# Patient Record
Sex: FEMALE | Race: WHITE | NOT HISPANIC OR LATINO | Employment: OTHER | URBAN - METROPOLITAN AREA
[De-identification: names, ages, dates, MRNs, and addresses within clinical notes are randomized per-mention and may not be internally consistent; named-entity substitution may affect disease eponyms.]

---

## 2020-10-29 NOTE — TELEPHONE ENCOUNTER
PATIENT NEEDS REFILLS ON METFORMIN. SHE IS NOT SURE THE DOSAGE OF MEDICATION. PHARMACY IS [facility] [place]. SHE IS COMPLETELY OUT OF METFORMIN.

## 2020-12-16 ENCOUNTER — OFFICE VISIT (OUTPATIENT)
Dept: ENDOCRINOLOGY | Facility: CLINIC | Age: 70
End: 2020-12-16

## 2020-12-16 ENCOUNTER — LAB REQUISITION (OUTPATIENT)
Dept: LAB | Facility: HOSPITAL | Age: 70
End: 2020-12-16

## 2020-12-16 VITALS
HEART RATE: 87 BPM | RESPIRATION RATE: 15 BRPM | DIASTOLIC BLOOD PRESSURE: 90 MMHG | WEIGHT: 216 LBS | HEIGHT: 67 IN | OXYGEN SATURATION: 100 % | SYSTOLIC BLOOD PRESSURE: 150 MMHG | BODY MASS INDEX: 33.9 KG/M2

## 2020-12-16 DIAGNOSIS — E11.65 UNCONTROLLED TYPE 2 DIABETES MELLITUS WITH HYPERGLYCEMIA (HCC): Primary | ICD-10-CM

## 2020-12-16 DIAGNOSIS — E78.2 MIXED HYPERLIPIDEMIA: ICD-10-CM

## 2020-12-16 DIAGNOSIS — Z00.00 ROUTINE GENERAL MEDICAL EXAMINATION AT A HEALTH CARE FACILITY: ICD-10-CM

## 2020-12-16 DIAGNOSIS — I10 BENIGN HYPERTENSION: ICD-10-CM

## 2020-12-16 PROBLEM — Z79.4 CONTROLLED TYPE 2 DIABETES MELLITUS WITHOUT COMPLICATION, WITH LONG-TERM CURRENT USE OF INSULIN: Status: ACTIVE | Noted: 2020-12-16

## 2020-12-16 PROBLEM — E11.9 CONTROLLED TYPE 2 DIABETES MELLITUS WITHOUT COMPLICATION, WITH LONG-TERM CURRENT USE OF INSULIN: Status: ACTIVE | Noted: 2020-12-16

## 2020-12-16 LAB
EXPIRATION DATE: ABNORMAL
HBA1C MFR BLD: 13.4 %
Lab: ABNORMAL

## 2020-12-16 PROCEDURE — 36415 COLL VENOUS BLD VENIPUNCTURE: CPT | Performed by: INTERNAL MEDICINE

## 2020-12-16 PROCEDURE — 99214 OFFICE O/P EST MOD 30 MIN: CPT | Performed by: INTERNAL MEDICINE

## 2020-12-16 PROCEDURE — 83036 HEMOGLOBIN GLYCOSYLATED A1C: CPT | Performed by: INTERNAL MEDICINE

## 2020-12-16 RX ORDER — LANCETS 33 GAUGE
EACH MISCELLANEOUS
Qty: 100 EACH | Refills: 3 | Status: SHIPPED | OUTPATIENT
Start: 2020-12-16 | End: 2021-06-18 | Stop reason: SDUPTHER

## 2020-12-16 RX ORDER — ICOSAPENT ETHYL 1000 MG/1
1 CAPSULE ORAL 2 TIMES DAILY WITH MEALS
Start: 2020-12-16 | End: 2020-12-18

## 2020-12-16 RX ORDER — SEMAGLUTIDE 1.34 MG/ML
0.5 INJECTION, SOLUTION SUBCUTANEOUS WEEKLY
Qty: 1.5 ML | Refills: 5 | Status: SHIPPED | OUTPATIENT
Start: 2020-12-16 | End: 2021-04-06 | Stop reason: SDUPTHER

## 2020-12-16 RX ORDER — ASPIRIN 81 MG/1
81 TABLET ORAL DAILY
COMMUNITY
End: 2021-09-23 | Stop reason: SDUPTHER

## 2020-12-16 NOTE — ASSESSMENT & PLAN NOTE
Diabetes is worsening.   Needs intensification of therapy.  Diabetes will be reassessed in 3 months.    A1c is terrible.  She has run out of medication.  Resume metformin.  Will add another medication.  We discussed using GLP-1 RA.  Start doing FSBS regularly.

## 2020-12-16 NOTE — ASSESSMENT & PLAN NOTE
Hypertension is worsening.  Continue current treatment regimen.  Blood pressure will be reassessed in 3 months.    BP elevated.  Hasn't taken meds yet this AM.

## 2020-12-16 NOTE — PROGRESS NOTES
"     Office Note      Date: 2020  Patient Name: Renata Lynn  MRN: [id number]  : 1950    Chief Complaint   Patient presents with   • Diabetes     Note: pt does not know meds and doesn't have a list; says she will fax list when she gets home       History of Present Illness:   Renata Lynn is a 70 y.o. female who presents for Diabetes type 2. Diagnosed in: 2018. Treated in past with oral agents. Current treatments: metformin. Number of insulin shots per day: none. Checks blood sugar none times a day. Has low blood sugar: no. Aspirin use: yes. Statin use: Yes. ACE-I/ARB use: Yes. Changes in health since last visit: none. Last eye exam .    Subjective      Diabetic Complications:  Eyes: No  Kidneys: No  Feet: No  Heart: No    Diet and Exercise:  Meals per day: 3  Minutes of exercise per week: 0 mins.    Review of Systems:   Review of Systems   Constitutional: Negative.    Cardiovascular: Negative.    Gastrointestinal: Negative.    Endocrine: Negative.        The following portions of the patient's history were reviewed and updated as appropriate: allergies, current medications, past family history, past medical history, past social history, past surgical history and problem list.    Objective       Visit Vitals  /90   Pulse 87   Resp 15   Ht 168.9 cm (66.5\")   Wt 98 kg (216 lb) Comment: pt stated   SpO2 100%   BMI 34.34 kg/m²       Physical Exam:  Physical Exam  Constitutional:       Appearance: Normal appearance.   Cardiovascular:      Pulses:           Dorsalis pedis pulses are 2+ on the right side and 2+ on the left side.        Posterior tibial pulses are 2+ on the right side and 2+ on the left side.   Feet:      Right foot:      Protective Sensation: 5 sites tested. 5 sites sensed.      Skin integrity: Skin integrity normal.      Toenail Condition: Right toenails are normal.      Left foot:      Protective Sensation: 5 sites tested. 5 sites sensed.      Skin integrity: Skin integrity " normal.      Toenail Condition: Left toenails are normal.   Neurological:      Mental Status: She is alert.         Labs:    HbA1c  Lab Results   Component Value Date    HGBA1C 13.4 12/16/2020       CMP  No results found for: GLUCOSE, BUN, CREATININE, EGFRIFNONA, EGFRIFAFRI, BCR, K, CO2, CALCIUM, PROTENTOTREF, LABIL2, BILIRUBIN, AST, ALT     Lipid Panel        TSH  No results found for: TSH, FREET4     Hemoglobin A1C  Lab Results   Component Value Date    HGBA1C 13.4 12/16/2020        Microalbumin/Creatinine  No results found for: MALBCRERATIO, CREATINIURIN, MICROALBUR        Assessment / Plan      Assessment & Plan:  Problem List Items Addressed This Visit        Cardiovascular and Mediastinum    Benign hypertension    Current Assessment & Plan     Hypertension is worsening.  Continue current treatment regimen.  Blood pressure will be reassessed in 3 months.    BP elevated.  Hasn't taken meds yet this AM.         Mixed hyperlipidemia    Current Assessment & Plan     Check lipids today.         Relevant Medications    icosapent ethyl (Vascepa) 1 g capsule capsule       Endocrine    Controlled type 2 diabetes mellitus without complication, with long-term current use of insulin (CMS/[facility]) - Primary    Current Assessment & Plan     Diabetes is worsening.   Needs intensification of therapy.  Diabetes will be reassessed in 3 months.    A1c is terrible.  She has run out of medication.  Resume metformin.  Will add another medication.  We discussed using GLP-1 RA.  Start doing FSBS regularly.         Relevant Medications    Semaglutide,0.25 or 0.5MG/DOS, (Ozempic, 0.25 or 0.5 MG/DOSE,) 2 MG/1.5ML solution pen-injector    metFORMIN (GLUCOPHAGE) 1000 MG tablet    Other Relevant Orders    POC Glycosylated Hemoglobin (Hb A1C) (Completed)    Comprehensive Metabolic Panel    Lipid Panel    Microalbumin / Creatinine Urine Ratio - Urine, Clean Catch    TSH           Return in about 3 months (around 3/16/2021) for Recheck with A1c,  CMP.    Leroy Whipple MD   12/16/2020

## 2020-12-18 ENCOUNTER — TELEPHONE (OUTPATIENT)
Dept: ENDOCRINOLOGY | Facility: CLINIC | Age: 70
End: 2020-12-18

## 2020-12-18 LAB
ALBUMIN SERPL-MCNC: 4.6 G/DL (ref 3.8–4.8)
ALBUMIN/CREAT UR: 22 MG/G CREAT (ref 0–29)
ALBUMIN/GLOB SERPL: 2 {RATIO} (ref 1.2–2.2)
ALP SERPL-CCNC: 110 IU/L (ref 39–117)
ALT SERPL-CCNC: 26 IU/L (ref 0–32)
AST SERPL-CCNC: 21 IU/L (ref 0–40)
BILIRUB SERPL-MCNC: 0.4 MG/DL (ref 0–1.2)
BUN SERPL-MCNC: 17 MG/DL (ref 8–27)
BUN/CREAT SERPL: 19 (ref 12–28)
CALCIUM SERPL-MCNC: 9.7 MG/DL (ref 8.7–10.3)
CHLORIDE SERPL-SCNC: 95 MMOL/L (ref 96–106)
CHOLEST SERPL-MCNC: 226 MG/DL (ref 100–199)
CO2 SERPL-SCNC: 23 MMOL/L (ref 20–29)
CONV .: NORMAL
CREAT SERPL-MCNC: 0.88 MG/DL (ref 0.57–1)
CREAT UR-MCNC: 29.1 MG/DL
GLOBULIN SER CALC-MCNC: 2.3 G/DL (ref 1.5–4.5)
GLUCOSE SERPL-MCNC: 445 MG/DL (ref 65–99)
HDLC SERPL-MCNC: 36 MG/DL
LDLC SERPL CALC-MCNC: 92 MG/DL (ref 0–99)
Lab: NORMAL
MICROALBUMIN UR-MCNC: 6.5 UG/ML
POTASSIUM SERPL-SCNC: 4.4 MMOL/L (ref 3.5–5.2)
PROT SERPL-MCNC: 6.9 G/DL (ref 6–8.5)
SODIUM SERPL-SCNC: 139 MMOL/L (ref 134–144)
TRIGL SERPL-MCNC: 593 MG/DL (ref 0–149)
TSH SERPL DL<=0.005 MIU/L-ACNC: 1.94 UIU/ML (ref 0.45–4.5)
VLDLC SERPL CALC-MCNC: 98 MG/DL (ref 5–40)

## 2020-12-18 RX ORDER — ICOSAPENT ETHYL 1000 MG/1
2 CAPSULE ORAL 2 TIMES DAILY WITH MEALS
Qty: 120 CAPSULE | Refills: 5 | Status: SHIPPED | OUTPATIENT
Start: 2020-12-18 | End: 2021-01-21 | Stop reason: SDUPTHER

## 2020-12-18 NOTE — TELEPHONE ENCOUNTER
PATIENT STATES THAT [facility] HAS TOLD HER THAT THEY DID NOT RECEIVE HER METFORMIN RX. PLEASE RESEND

## 2020-12-21 PROBLEM — E11.65 UNCONTROLLED TYPE 2 DIABETES MELLITUS WITH HYPERGLYCEMIA: Status: ACTIVE | Noted: 2020-12-16

## 2021-01-21 RX ORDER — ICOSAPENT ETHYL 1000 MG/1
2 CAPSULE ORAL 2 TIMES DAILY WITH MEALS
Qty: 120 CAPSULE | Refills: 5 | Status: SHIPPED | OUTPATIENT
Start: 2021-01-21 | End: 2021-01-25

## 2021-01-25 RX ORDER — ICOSAPENT ETHYL 1000 MG/1
CAPSULE ORAL
Qty: 60 CAPSULE | Refills: 5 | Status: SHIPPED | OUTPATIENT
Start: 2021-01-25 | End: 2021-07-16

## 2021-04-06 ENCOUNTER — OFFICE VISIT (OUTPATIENT)
Dept: ENDOCRINOLOGY | Facility: CLINIC | Age: 71
End: 2021-04-06

## 2021-04-06 VITALS
WEIGHT: 208.4 LBS | TEMPERATURE: 97.8 F | HEIGHT: 66 IN | DIASTOLIC BLOOD PRESSURE: 58 MMHG | HEART RATE: 92 BPM | SYSTOLIC BLOOD PRESSURE: 140 MMHG | OXYGEN SATURATION: 97 % | BODY MASS INDEX: 33.49 KG/M2

## 2021-04-06 DIAGNOSIS — E11.65 UNCONTROLLED TYPE 2 DIABETES MELLITUS WITH HYPERGLYCEMIA (HCC): Primary | ICD-10-CM

## 2021-04-06 DIAGNOSIS — I10 BENIGN HYPERTENSION: ICD-10-CM

## 2021-04-06 DIAGNOSIS — E78.2 MIXED HYPERLIPIDEMIA: ICD-10-CM

## 2021-04-06 LAB
EXPIRATION DATE: NORMAL
HBA1C MFR BLD: 7 %
Lab: NORMAL

## 2021-04-06 PROCEDURE — 83036 HEMOGLOBIN GLYCOSYLATED A1C: CPT | Performed by: INTERNAL MEDICINE

## 2021-04-06 PROCEDURE — 99214 OFFICE O/P EST MOD 30 MIN: CPT | Performed by: INTERNAL MEDICINE

## 2021-04-06 RX ORDER — ATORVASTATIN CALCIUM 10 MG/1
10 TABLET, FILM COATED ORAL
COMMUNITY
Start: 2021-03-06 | End: 2021-09-23 | Stop reason: SDUPTHER

## 2021-04-06 RX ORDER — HYDROCHLOROTHIAZIDE 25 MG/1
25 TABLET ORAL DAILY
COMMUNITY
Start: 2021-03-06 | End: 2021-09-23 | Stop reason: SDUPTHER

## 2021-04-06 RX ORDER — VALSARTAN 320 MG/1
320 TABLET ORAL DAILY
COMMUNITY
Start: 2021-04-01 | End: 2021-09-23 | Stop reason: SDUPTHER

## 2021-04-06 RX ORDER — CARVEDILOL 12.5 MG/1
12.5 TABLET ORAL 2 TIMES DAILY
COMMUNITY
Start: 2021-01-22 | End: 2021-09-23 | Stop reason: SDUPTHER

## 2021-04-06 RX ORDER — SEMAGLUTIDE 1.34 MG/ML
0.5 INJECTION, SOLUTION SUBCUTANEOUS WEEKLY
Qty: 4.5 ML | Refills: 3 | Status: SHIPPED | OUTPATIENT
Start: 2021-04-06 | End: 2021-06-18

## 2021-04-06 RX ORDER — HYDROXYPROPYL CELLULOSE 5 MG/1
INSERT OPHTHALMIC DAILY
COMMUNITY
Start: 2021-02-28 | End: 2022-09-30

## 2021-04-06 NOTE — PROGRESS NOTES
"     Office Note      Date: 2021  Patient Name: Renata Lynn  MRN: [id number]  : 1950    Chief Complaint   Patient presents with   • Diabetes     F/U type 2       History of Present Illness:   Renata Lynn is a 70 y.o. female who presents for Diabetes type 2. Diagnosed in: 2018. Treated in past with oral agents. Current treatments: metformin and ozempic. Number of insulin shots per day: none. Checks blood sugar one time a day. Has low blood sugar: no. Aspirin use: yes. Statin use: Yes. ACE-I/ARB use: Yes. Changes in health since last visit: none. Last eye exam .    Subjective      Diabetic Complications:  Eyes: No  Kidneys: No  Feet: No  Heart: No    Diet and Exercise:  Meals per day: 3  Minutes of exercise per week: 0 mins.    Review of Systems:   Review of Systems   Constitutional: Negative.    Cardiovascular: Negative.    Gastrointestinal: Negative.    Endocrine: Negative.        The following portions of the patient's history were reviewed and updated as appropriate: allergies, current medications, past family history, past medical history, past social history, past surgical history and problem list.    Objective       Visit Vitals  /58   Pulse 92   Temp 97.8 °F (36.6 °C) (Infrared)   Ht 167.6 cm (66\")   Wt 94.5 kg (208 lb 6.4 oz)   SpO2 97%   BMI 33.64 kg/m²       Physical Exam:  Physical Exam  Constitutional:       Appearance: Normal appearance.   Neurological:      Mental Status: She is alert.         Labs:    HbA1c  Lab Results   Component Value Date    HGBA1C 7.0 2021       CMP  Lab Results   Component Value Date    BUN 17 2020    CREATININE 0.88 2020    EGFRIFNONA 67 2020    EGFRIFAFRI 78 2020    BCR 19 2020    K 4.4 2020    CO2 23 2020    CALCIUM 9.7 2020    PROTENTOTREF 6.9 2020    LABIL2 2.0 2020    AST 21 2020    ALT 26 2020        Lipid Panel  Lab Results   Component Value Date    CHLPL 226 (H) " 12/16/2020    HDL 36 (L) 12/16/2020    LDL 92 12/16/2020    TRIG 593 (H) 12/16/2020        TSH  Lab Results   Component Value Date    TSH 1.940 12/16/2020        Hemoglobin A1C  Lab Results   Component Value Date    HGBA1C 7.0 04/06/2021        Microalbumin/Creatinine  Lab Results   Component Value Date    MALBCRERATIO 22 12/16/2020    CREATINIURIN 29.1 12/16/2020    MICROALBUR 6.5 12/16/2020           Assessment / Plan      Assessment & Plan:  Diagnoses and all orders for this visit:    1. Uncontrolled type 2 diabetes mellitus with hyperglycemia (CMS/[facility]) (Primary)  Assessment & Plan:  Diabetes is improving with treatment.   Continue current treatment regimen.  Diabetes will be reassessed in 3 months.    Orders:  -     POC Glycosylated Hemoglobin (Hb A1C)  -     Comprehensive Metabolic Panel; Future    2. Benign hypertension  Assessment & Plan:  Hypertension is improving with treatment.  Continue current treatment regimen.  Blood pressure will be reassessed at the next regular appointment.      3. Mixed hyperlipidemia  Assessment & Plan:  Continue statin and vascepa.  Check lipids next visit.      Other orders  -     metFORMIN (GLUCOPHAGE) 1000 MG tablet; Take 1 tablet by mouth 2 (Two) Times a Day With Meals.  Dispense: 180 tablet; Refill: 1  -     Semaglutide,0.25 or 0.5MG/DOS, (Ozempic, 0.25 or 0.5 MG/DOSE,) 2 MG/1.5ML solution pen-injector; Inject 0.5 mg under the skin into the appropriate area as directed 1 (One) Time Per Week.  Dispense: 4.5 mL; Refill: 3      Return in about 3 months (around 7/6/2021) for Recheck with A1c, CMP, lipids.    Leroy Whipple MD   04/06/2021

## 2021-04-07 LAB
ALBUMIN SERPL-MCNC: 4.6 G/DL (ref 3.5–5.2)
ALBUMIN/GLOB SERPL: 1.8 G/DL
ALP SERPL-CCNC: 92 U/L (ref 39–117)
ALT SERPL-CCNC: 35 U/L (ref 1–33)
AST SERPL-CCNC: 32 U/L (ref 1–32)
BILIRUB SERPL-MCNC: 0.4 MG/DL (ref 0–1.2)
BUN SERPL-MCNC: 20 MG/DL (ref 8–23)
BUN/CREAT SERPL: 26 (ref 7–25)
CALCIUM SERPL-MCNC: 10 MG/DL (ref 8.6–10.5)
CHLORIDE SERPL-SCNC: 101 MMOL/L (ref 98–107)
CO2 SERPL-SCNC: 25.2 MMOL/L (ref 22–29)
CREAT SERPL-MCNC: 0.77 MG/DL (ref 0.57–1)
GLOBULIN SER CALC-MCNC: 2.5 GM/DL
GLUCOSE SERPL-MCNC: 165 MG/DL (ref 65–99)
POTASSIUM SERPL-SCNC: 4.4 MMOL/L (ref 3.5–5.2)
PROT SERPL-MCNC: 7.1 G/DL (ref 6–8.5)
SODIUM SERPL-SCNC: 140 MMOL/L (ref 136–145)

## 2021-06-18 RX ORDER — LANCETS 33 GAUGE
EACH MISCELLANEOUS
Qty: 100 EACH | Refills: 1 | Status: SHIPPED | OUTPATIENT
Start: 2021-06-18 | End: 2022-09-26 | Stop reason: SDUPTHER

## 2021-06-18 RX ORDER — SEMAGLUTIDE 1.34 MG/ML
0.5 INJECTION, SOLUTION SUBCUTANEOUS WEEKLY
Qty: 2 ML | Refills: 5 | Status: SHIPPED | OUTPATIENT
Start: 2021-06-18 | End: 2021-08-04 | Stop reason: SDUPTHER

## 2021-06-18 NOTE — TELEPHONE ENCOUNTER
Pt called requesting a refill on Lancets Micro Thin 33g. Pt last seen 12/16/20 pt next appt 08/04/21

## 2021-07-16 RX ORDER — ICOSAPENT ETHYL 1000 MG/1
CAPSULE ORAL
Qty: 60 CAPSULE | Refills: 5 | Status: SHIPPED | OUTPATIENT
Start: 2021-07-16 | End: 2021-08-04 | Stop reason: SDUPTHER

## 2021-07-19 RX ORDER — BLOOD SUGAR DIAGNOSTIC
STRIP MISCELLANEOUS
Qty: 100 EACH | Refills: 5 | Status: SHIPPED | OUTPATIENT
Start: 2021-07-19 | End: 2022-09-26 | Stop reason: SDUPTHER

## 2021-08-04 ENCOUNTER — MEDICATION THERAPY MANAGEMENT (OUTPATIENT)
Dept: ENDOCRINOLOGY | Facility: CLINIC | Age: 71
End: 2021-08-04

## 2021-08-04 ENCOUNTER — LAB (OUTPATIENT)
Dept: LAB | Facility: HOSPITAL | Age: 71
End: 2021-08-04

## 2021-08-04 ENCOUNTER — OFFICE VISIT (OUTPATIENT)
Dept: ENDOCRINOLOGY | Facility: CLINIC | Age: 71
End: 2021-08-04

## 2021-08-04 ENCOUNTER — TELEPHONE (OUTPATIENT)
Dept: ENDOCRINOLOGY | Facility: CLINIC | Age: 71
End: 2021-08-04

## 2021-08-04 VITALS
BODY MASS INDEX: 33.43 KG/M2 | OXYGEN SATURATION: 98 % | WEIGHT: 208 LBS | HEIGHT: 66 IN | SYSTOLIC BLOOD PRESSURE: 130 MMHG | DIASTOLIC BLOOD PRESSURE: 64 MMHG | HEART RATE: 80 BPM

## 2021-08-04 DIAGNOSIS — I10 BENIGN HYPERTENSION: ICD-10-CM

## 2021-08-04 DIAGNOSIS — E11.65 UNCONTROLLED TYPE 2 DIABETES MELLITUS WITH HYPERGLYCEMIA (HCC): Primary | ICD-10-CM

## 2021-08-04 DIAGNOSIS — E78.2 MIXED HYPERLIPIDEMIA: ICD-10-CM

## 2021-08-04 LAB
ALBUMIN SERPL-MCNC: 4.7 G/DL (ref 3.5–5.2)
ALBUMIN/GLOB SERPL: 1.8 G/DL
ALP SERPL-CCNC: 97 U/L (ref 39–117)
ALT SERPL-CCNC: 41 U/L (ref 1–33)
AST SERPL-CCNC: 43 U/L (ref 1–32)
BILIRUB SERPL-MCNC: 0.4 MG/DL (ref 0–1.2)
BUN SERPL-MCNC: 20 MG/DL (ref 8–23)
BUN/CREAT SERPL: 27 (ref 7–25)
CALCIUM SERPL-MCNC: 10.2 MG/DL (ref 8.6–10.5)
CHLORIDE SERPL-SCNC: 99 MMOL/L (ref 98–107)
CHOLEST SERPL-MCNC: 170 MG/DL (ref 0–200)
CO2 SERPL-SCNC: 26.3 MMOL/L (ref 22–29)
CREAT SERPL-MCNC: 0.74 MG/DL (ref 0.57–1)
EXPIRATION DATE: ABNORMAL
EXPIRATION DATE: NORMAL
GLOBULIN SER CALC-MCNC: 2.6 GM/DL
GLUCOSE BLDC GLUCOMTR-MCNC: 193 MG/DL (ref 70–130)
GLUCOSE SERPL-MCNC: 175 MG/DL (ref 65–99)
HBA1C MFR BLD: 6.9 %
HDLC SERPL-MCNC: 34 MG/DL (ref 40–60)
LDLC SERPL CALC-MCNC: 64 MG/DL (ref 0–100)
Lab: ABNORMAL
Lab: NORMAL
POTASSIUM SERPL-SCNC: 4.3 MMOL/L (ref 3.5–5.2)
PROT SERPL-MCNC: 7.3 G/DL (ref 6–8.5)
SODIUM SERPL-SCNC: 140 MMOL/L (ref 136–145)
TRIGL SERPL-MCNC: 471 MG/DL (ref 0–150)
VLDLC SERPL CALC-MCNC: 72 MG/DL (ref 5–40)

## 2021-08-04 PROCEDURE — 99214 OFFICE O/P EST MOD 30 MIN: CPT | Performed by: INTERNAL MEDICINE

## 2021-08-04 PROCEDURE — 3044F HG A1C LEVEL LT 7.0%: CPT | Performed by: INTERNAL MEDICINE

## 2021-08-04 PROCEDURE — 83036 HEMOGLOBIN GLYCOSYLATED A1C: CPT | Performed by: INTERNAL MEDICINE

## 2021-08-04 PROCEDURE — 82947 ASSAY GLUCOSE BLOOD QUANT: CPT | Performed by: INTERNAL MEDICINE

## 2021-08-04 RX ORDER — SEMAGLUTIDE 1.34 MG/ML
0.5 INJECTION, SOLUTION SUBCUTANEOUS WEEKLY
Qty: 4.5 ML | Refills: 3 | Status: SHIPPED | OUTPATIENT
Start: 2021-08-04 | End: 2021-08-09 | Stop reason: SDUPTHER

## 2021-08-04 RX ORDER — ICOSAPENT ETHYL 1000 MG/1
1 CAPSULE ORAL 2 TIMES DAILY
Qty: 180 CAPSULE | Refills: 3 | Status: SHIPPED | OUTPATIENT
Start: 2021-08-04 | End: 2021-08-09 | Stop reason: SDUPTHER

## 2021-08-04 NOTE — PROGRESS NOTES
MTM-[facility]M Pharmacy Visit [facility]    Renata Lynn is a 70 y.o. female with T2DM seen by Endocrinology and assessed by the [facility] Pharmacist at [facility]. This was an initial MTM visit for Renata Lynn.    Renata Lynn was introduced to the [facility] and allergies, PMH, and medications were reviewed.    Insurance Coverage/Eligibility:  · [employer] ([employer]  · Eligible for  Pharmacy    Past Medical History:   Diagnosis Date   • Hypertensive disorder    • Mixed hyperlipidemia    • Obesity     body mass index 30+-obesity   • Type 2 diabetes mellitus, uncontrolled (CMS/[facility])      Social History     Socioeconomic History   • Marital status:      Spouse name: Not on file   • Number of children: Not on file   • Years of education: Not on file   • Highest education level: Not on file   Tobacco Use   • Smoking status: Never Smoker   • Smokeless tobacco: Never Used   Vaping Use   • Vaping Use: Never used   Substance and Sexual Activity   • Alcohol use: Never   • Drug use: Never   • Sexual activity: Defer     Medication Allergies:  Penicillins     Current Outpatient Medications:   •  aspirin 81 MG EC tablet, Take 81 mg by mouth Daily., Disp: , Rfl:   •  atorvastatin (LIPITOR) 10 MG tablet, Take 10 mg by mouth every night at bedtime., Disp: , Rfl:   •  carvedilol (COREG) 12.5 MG tablet, Take 12.5 mg by mouth 2 (Two) Times a Day., Disp: , Rfl:   •  glucose blood (Accu-Chek Guide) test strip, Test Three Times Daily, Disp: 100 each, Rfl: 5  •  hydroCHLOROthiazide (HYDRODIURIL) 25 MG tablet, Take 25 mg by mouth Daily., Disp: , Rfl:   •  Lacrisert (LACRISERT) 5 MG insert, Daily., Disp: , Rfl:   •  Lancets Micro Thin 33G misc, Use once daily and PRN, Disp: 100 each, Rfl: 1  •  metFORMIN (GLUCOPHAGE) 1000 MG tablet, Take 1 tablet by mouth 2 (Two) Times a Day With Meals., Disp: 180 tablet, Rfl: 3  •  Semaglutide,0.25 or 0.5MG/DOS,  (Ozempic, 0.25 or 0.5 MG/DOSE,) 2 MG/1.5ML solution pen-injector, Inject 0.5 mg under the skin into the appropriate area as directed 1 (One) Time Per Week., Disp: 4.5 mL, Rfl: 3  •  valsartan (DIOVAN) 320 MG tablet, Take 320 mg by mouth Daily., Disp: , Rfl:   •  Vascepa 1 g capsule capsule, Take 1 g by mouth 2 (Two) Times a Day., Disp: 180 capsule, Rfl: 3    Vitals/Labs:     BP: (130)/(64) 130/64   Heart Rate:  [80] 80         Lab Results   Component Value Date    HGBA1C 6.9 08/04/2021     Lab Results   Component Value Date    CALCIUM 10.0 04/06/2021     04/06/2021    K 4.4 04/06/2021    CO2 25.2 04/06/2021     04/06/2021    BUN 20 04/06/2021    CREATININE 0.77 04/06/2021    EGFRIFAFRI 90 04/06/2021    EGFRIFNONA 74 04/06/2021    BCR 26.0 (H) 04/06/2021     Lab Results   Component Value Date    CHLPL 226 (H) 12/16/2020    TRIG 593 (H) 12/16/2020    HDL 36 (L) 12/16/2020    LDL 92 12/16/2020     Microalbumin    Microalbumin 12/16/20   Microalbumin, Urine 6.5            Drug-Drug Interactions:   · Hypoglycemia Monitoring for Multiple Diabetes Medications  · No Other Clinically Significant Interactions    Medication Assessment:   1. Aspirin - Currently Taking  2. Statin - Currently Taking  3. ACEi/ARB - Currently Taking    Medication Education on Specialty Medication:  The patient was provided medication education via verbal and written information on current medications. Patient expressed understanding and had no further questions at this time.    · Vascepa (Icosapent Ethyl):   o This medication is used to lower triglycerides. It may also be used in some patients to reduce the risk of heart attack, stroke, or other heart problems  o Administer with meals. Swallow whole; do not break, crush, dissolve, or chew capsules  o Notify your doctor if you are allergic to fish or shellfish  o Certain types of abnormal heartbeat (atrial fibrillation or atrial flutter) have happened. The risk is increased in patients  who have these abnormal heartbeats in the past.   o Other side effects may include bleeding or bruising, muscle pain, and constipation    · Ozempic  o Use a new needle every time Ozempic is used  o Unopened pens should be stored in refrigerator, opened pens may be stored in refrigerator or at room temperature for up to 56 days  o If you miss a dose, take it as soon as you remember and then get back on schedule  - If it has been > 5 days, skip that dose and get back on your regular schedule  - Never double up doses to make up for a missed dose  o Nausea, vomiting, and diarrhea are msot common when first starting Ozempic, but they should decrease over time  o Make sure to eat smaller meals throughout the day versus larger meals and this should help with GI symptoms as well.      Current Barriers to Therapy  Patient is taking medications as prescribed and does not have any current adherence issues or barriers. She does have difficulty with access, on-time filling, communication with current pharmacy, and would like to switch to  pharmacy, pending cost is not significantly higher at .      Assessment & Plan  1. Provider Changes This Visit: None  2. Therapy Modifications Recommended: None at This Time   3. Provided contact information for the pharmacy team and discussed [facility] services available to patient, including: monitoring of refills, prior authorizations, and copay coupon cards, as applicable, as well as curb-side pick-up or mail-order as needed.   4. Pending cost analysis (refill too soon) but patient is interested in switching all meds to  Pharmacy as she is dissatisfied with current pharmacy. Will contact patient with cost breakdown Monday AM (8/9) and make switch to  if patient would still like to proceed. Will let Dr. Whipple know if new prescriptions to  are required.   5. Care Coordinator, Maisha Snyder CPhT, will assist with the transfer of prescriptions as  applicable.      Lucia Cid PharmD  8/4/2021  13:11 EDT

## 2021-08-04 NOTE — PROGRESS NOTES
"     Office Note      Date: 2021  Patient Name: Renata Lynn  MRN: [id number]  : 1950    Chief Complaint   Patient presents with   • Diabetes       History of Present Illness:   Renata Lynn is a 70 y.o. female who presents for Diabetes type 2. Diagnosed in: 2018. Treated in past with oral agents. Current treatments: metformin and ozempic. Number of insulin shots per day: none. Checks blood sugar two times a day. Has low blood sugar: no. Aspirin use: yes. Statin use: Yes. ACE-I/ARB use: Yes. Changes in health since last visit: none. Last eye exam .    Subjective      Diabetic Complications:  Eyes: No  Kidneys: No  Feet: No  Heart: No    Diet and Exercise:  Meals per day: 3  Minutes of exercise per week: 0 mins.    Review of Systems:   Review of Systems   Constitutional: Negative.    Cardiovascular: Negative.    Gastrointestinal: Negative.    Endocrine: Negative.        The following portions of the patient's history were reviewed and updated as appropriate: allergies, current medications, past family history, past medical history, past social history, past surgical history and problem list.    Objective       Visit Vitals  /64 (BP Location: Left arm, Patient Position: Sitting, Cuff Size: Adult)   Pulse 80   Ht 167.6 cm (66\")   Wt 94.3 kg (208 lb)   SpO2 98%   BMI 33.57 kg/m²       Physical Exam:  Physical Exam  Constitutional:       Appearance: Normal appearance.   Cardiovascular:      Pulses:           Dorsalis pedis pulses are 2+ on the right side and 2+ on the left side.        Posterior tibial pulses are 2+ on the right side and 2+ on the left side.   Feet:      Right foot:      Protective Sensation: 5 sites tested. 5 sites sensed.      Skin integrity: Skin integrity normal.      Toenail Condition: Right toenails are normal.      Left foot:      Protective Sensation: 5 sites tested. 5 sites sensed.      Skin integrity: Skin integrity normal.      Toenail Condition: Left toenails are " normal.   Neurological:      Mental Status: She is alert.         Labs:    HbA1c  Lab Results   Component Value Date    HGBA1C 6.9 08/04/2021       CMP  Lab Results   Component Value Date    BUN 20 04/06/2021    CREATININE 0.77 04/06/2021    EGFRIFNONA 74 04/06/2021    EGFRIFAFRI 90 04/06/2021    BCR 26.0 (H) 04/06/2021    K 4.4 04/06/2021    CO2 25.2 04/06/2021    CALCIUM 10.0 04/06/2021    PROTENTOTREF 7.1 04/06/2021    LABIL2 1.8 04/06/2021    AST 32 04/06/2021    ALT 35 (H) 04/06/2021        Lipid Panel  Lab Results   Component Value Date    CHLPL 226 (H) 12/16/2020    HDL 36 (L) 12/16/2020    LDL 92 12/16/2020    TRIG 593 (H) 12/16/2020        TSH  Lab Results   Component Value Date    TSH 1.940 12/16/2020        Hemoglobin A1C  Lab Results   Component Value Date    HGBA1C 6.9 08/04/2021        Microalbumin/Creatinine  Lab Results   Component Value Date    MALBCRERATIO 22 12/16/2020    CREATINIURIN 29.1 12/16/2020    MICROALBUR 6.5 12/16/2020           Assessment / Plan      Assessment & Plan:  Diagnoses and all orders for this visit:    1. Uncontrolled type 2 diabetes mellitus with hyperglycemia (CMS/[facility]) (Primary)  Assessment & Plan:  Diabetes is unchanged.   Continue current treatment regimen.  Diabetes will be reassessed in 3 months.    Orders:  -     POC Glycosylated Hemoglobin (Hb A1C)  -     POC Glucose, Blood  -     Comprehensive Metabolic Panel; Future  -     Lipid Panel; Future    2. Benign hypertension  Assessment & Plan:  Hypertension is improving with treatment.  Continue current treatment regimen.  Blood pressure will be reassessed at the next regular appointment.      3. Mixed hyperlipidemia  Assessment & Plan:  Continue statin and vascepa.  Check lipids today.      Other orders  -     Vascepa 1 g capsule capsule; Take 1 g by mouth 2 (Two) Times a Day.  Dispense: 180 capsule; Refill: 3  -     Semaglutide,0.25 or 0.5MG/DOS, (Ozempic, 0.25 or 0.5 MG/DOSE,) 2 MG/1.5ML solution pen-injector; Inject  0.5 mg under the skin into the appropriate area as directed 1 (One) Time Per Week.  Dispense: 4.5 mL; Refill: 3  -     metFORMIN (GLUCOPHAGE) 1000 MG tablet; Take 1 tablet by mouth 2 (Two) Times a Day With Meals.  Dispense: 180 tablet; Refill: 3      Return in about 3 months (around 11/4/2021) for Recheck with A1c.    Leroy Whipple MD   08/04/2021

## 2021-08-09 RX ORDER — ICOSAPENT ETHYL 1000 MG/1
1 CAPSULE ORAL 2 TIMES DAILY
Qty: 180 CAPSULE | Refills: 3 | Status: SHIPPED | OUTPATIENT
Start: 2021-08-09 | End: 2021-12-17 | Stop reason: SDUPTHER

## 2021-08-09 RX ORDER — SEMAGLUTIDE 1.34 MG/ML
0.5 INJECTION, SOLUTION SUBCUTANEOUS WEEKLY
Qty: 4.5 ML | Refills: 3 | Status: SHIPPED | OUTPATIENT
Start: 2021-08-09 | End: 2022-07-19 | Stop reason: SDUPTHER

## 2021-08-09 NOTE — TELEPHONE ENCOUNTER
Preferred Pharmacy Change    Contacted patient for follow up from pharmacist encounter last week.     Patient would like to move forward and have all current and future medications filled through  [facility].    · Updated pharmacy preferences.   · Dr. Whipple to send new prescriptions to pharmacy for endocrinology-related medications  · Pharmacy team will transfer remaining prescriptions from [facility] to  [facility]    Lucia Cid, Karen  8/9/2021  10:22 EDT

## 2021-09-23 ENCOUNTER — OFFICE VISIT (OUTPATIENT)
Dept: ORTHOPEDIC SURGERY | Facility: CLINIC | Age: 71
End: 2021-09-23

## 2021-09-23 VITALS
DIASTOLIC BLOOD PRESSURE: 75 MMHG | HEART RATE: 82 BPM | BODY MASS INDEX: 32.53 KG/M2 | HEIGHT: 66 IN | SYSTOLIC BLOOD PRESSURE: 156 MMHG | WEIGHT: 202.4 LBS

## 2021-09-23 DIAGNOSIS — S83.242A TEAR OF MEDIAL MENISCUS OF LEFT KNEE, CURRENT, UNSPECIFIED TEAR TYPE, INITIAL ENCOUNTER: ICD-10-CM

## 2021-09-23 DIAGNOSIS — M17.12 PRIMARY OSTEOARTHRITIS OF LEFT KNEE: Primary | ICD-10-CM

## 2021-09-23 DIAGNOSIS — S89.92XA INJURY OF LEFT KNEE, INITIAL ENCOUNTER: ICD-10-CM

## 2021-09-23 PROCEDURE — 99204 OFFICE O/P NEW MOD 45 MIN: CPT | Performed by: ORTHOPAEDIC SURGERY

## 2021-09-23 NOTE — PROGRESS NOTES
[facility] Note        Subjective     Pain of the Left Knee      HPI  Renata Lynn is a 70 y.o. female who presents with new problem of: left knee pain.  Onset: atraumatic and gradual in nature. The issue has been ongoing for 3 year(s). Pain is a 6/10 on the pain scale. Pain is described as burning and shooting. Associated symptoms include pain, swelling, popping and giving way/buckling. The pain is worse with walking, standing and sitting; resting, ice and heat improve the pain. Previous treatments have included: NSAIDS and physical therapy.    I have reviewed the following portions of the patient's history:History of Present Illness and review of systems.         Patient here today for new problem day regarding her left knee.  This has been going on for about 3 years.  She went to therapy for about a year and it helped while she was doing it but after she stopped therapy, things did not persist in terms of improvement.  She has medial sided knee pain.  She says when it hurts badly, the entire leg hurts.  She has not been able to exercise.  She has gone to her PCP and told that something needs to be done at this point.    Past Medical History:   Diagnosis Date   • Basal cell carcinoma    • Hypertensive disorder    • Mixed hyperlipidemia    • Obesity     body mass index 30+-obesity   • Type 2 diabetes mellitus, uncontrolled (CMS/HCC)       Past Surgical History:   Procedure Laterality Date   • APPENDECTOMY     • CARPAL TUNNEL RELEASE Bilateral 2004   • CATARACT EXTRACTION, BILATERAL     • COLONOSCOPY     • FOOT SURGERY Right 2003    hammer toe surgery   • SKIN CANCER EXCISION      22 years ago-basal cell carcinoma removed off of face   • WISDOM TOOTH EXTRACTION        Family History   Problem Relation Age of Onset   • No Known Problems Mother    • No Known Problems Father      Social History     Socioeconomic History   • Marital status:      Spouse name: Not on file   • Number of  children: Not on file   • Years of education: Not on file   • Highest education level: Not on file   Tobacco Use   • Smoking status: Former Smoker     Years: 20.00     Types: Cigarettes   • Smokeless tobacco: Never Used   Vaping Use   • Vaping Use: Never used   Substance and Sexual Activity   • Alcohol use: Never   • Drug use: Never   • Sexual activity: Defer      Current Outpatient Medications on File Prior to Visit   Medication Sig Dispense Refill   • Accu-Chek FastClix Lancets misc Test blood glucose daily and as needed. 100 each 12   • Adult Aspirin Regimen 81 MG EC tablet Take 1 tablet by mouth Daily. 90 tablet 1   • atorvastatin (LIPITOR) 10 MG tablet Take 1 tablet by mouth every night at bedtime. 90 tablet 3   • carvedilol (COREG) 12.5 MG tablet Take 1 tablet by mouth 2 (two) times a day. 180 tablet 3   • esomeprazole (nexIUM) 20 MG capsule Take 20 mg by mouth Every Morning Before Breakfast.     • glucose blood (Accu-Chek Guide) test strip Test Three Times Daily 100 each 5   • glucose blood (Accu-Chek Guide) test strip Test blood glucose daily and as needed. 100 each 12   • hydroCHLOROthiazide (HYDRODIURIL) 25 MG tablet Take 1 tablet by mouth Daily. 90 tablet 3   • Lacrisert (LACRISERT) 5 MG insert Daily.     • Lacrisert (LACRISERT) 5 MG insert Insert under each eyelid daily 180 each 3   • Lancets Micro Thin 33G misc Use once daily and  each 1   • metFORMIN (GLUCOPHAGE) 1000 MG tablet Take 1 tablet by mouth 2 (Two) Times a Day With Meals. 180 tablet 3   • montelukast (SINGULAIR) 10 MG tablet Take 1 tablet by mouth Daily. 90 tablet 1   • Semaglutide,0.25 or 0.5MG/DOS, (Ozempic, 0.25 or 0.5 MG/DOSE,) 2 MG/1.5ML solution pen-injector Inject 0.5 mg under the skin into the appropriate area as directed 1 (One) Time Per Week. 4.5 mL 3   • valsartan (DIOVAN) 320 MG tablet 1 tablet by mouth daily 90 tablet 1   • Vascepa 1 g capsule capsule Take 1 capsule (1 gram) by mouth 2 (Two) Times a Day. 180 capsule 3   •  "meloxicam (MOBIC) 15 MG tablet Take 1 tablet by mouth Daily. 30 tablet 0   • [DISCONTINUED] aspirin 81 MG EC tablet Take 81 mg by mouth Daily.     • [DISCONTINUED] atorvastatin (LIPITOR) 10 MG tablet Take 10 mg by mouth every night at bedtime.     • [DISCONTINUED] carvedilol (COREG) 12.5 MG tablet Take 12.5 mg by mouth 2 (Two) Times a Day.     • [DISCONTINUED] hydroCHLOROthiazide (HYDRODIURIL) 25 MG tablet Take 25 mg by mouth Daily.     • [DISCONTINUED] valsartan (DIOVAN) 320 MG tablet Take 320 mg by mouth Daily.       No current facility-administered medications on file prior to visit.      Allergies   Allergen Reactions   • Penicillins Swelling          Review of Systems   Constitutional: Negative.    HENT: Positive for dental problem and ear pain.    Eyes: Negative.    Respiratory: Positive for apnea.    Cardiovascular: Negative.    Gastrointestinal: Negative.    Endocrine: Negative.    Genitourinary: Positive for pelvic pain.   Musculoskeletal: Positive for arthralgias.   Skin: Negative.    Allergic/Immunologic: Positive for environmental allergies.   Neurological: Negative.    Hematological: Negative.    Psychiatric/Behavioral: Negative.         I reviewed the patient's chief complaint, history of present illness, review of systems, past medical history, surgical history, family history, social history, medications and allergy list.        Objective      Physical Exam  /75   Pulse 82   Ht 167.6 cm (65.98\")   Wt 91.8 kg (202 lb 6.4 oz)   BMI 32.68 kg/m²     Body mass index is 32.68 kg/m².    General  Mental Status - alert  General Appearance - cooperative, well groomed, not in acute distress  Orientation - Oriented X3  Build & Nutrition - well developed and well nourished  Posture - normal posture  Gait - normal gait       Ortho Exam      Musculoskeletal:  Global Assessment:  Overall assessment of Lower Extremity Muscle Strength and Tone:  Left quadriceps--5/5   Left hamstrings--5/5       Left tibialis " anterior--5/5  Left gastroc-soleus--5/5  Left EHL --5/5    Lower Extremity:    Inspection and Palpation:  Left knee:  Tenderness:  Over the medial joint line and moderate severity  Effusion:  1+  Crepitus:  Positive  Pulses:  2+  Ecchymosis:  None  Warmth:  None     ROM:  Left:  Extension: 5     Flexion:120    Instability:    Left:    Lachman Test:  Negative   Varus stress test negative  Valgus stress test negative    Deformities/Malalignments/Discrepancies:    Left:  Genu Varum     Functional Testing:  Alexandra's test:  Negative  Patella grind test:  Positive  Q-angle:  normal      Imaging/Studies  Imaging Results (Last 24 Hours)     Procedure Component Value Units Date/Time    XR Knee 4+ View Left [[id number]] Resulted: 09/23/21 0928     Updated: 09/23/21 0928    Narrative:      Knee X-Ray    Indication: Pain    Study:  Upright AP, Skiers, Lateral, and Sunrise views of Left knee(s)    Comparison: None    Findings:    Patient appears to have moderate to early severe hypertrophic degenerative   changes in the medial compartment.    There are mild degenerative changes in the lateral compartment.    There are moderate to early severe changes in the patellofemoral   compartment.    Patient has overall varus alignment.    Kellgren-Silvino ndGndrndanddndend:nd nd2nd Impression:   Moderate to early severe medial compartment and patellofemoral compartment   degnerative changes of the knee           We reviewed an MRI report and images from [facility] dated 9/9/2021.  Impression is posterior horn medial meniscal tear and osteoarthritis in the medial compartment.      Hemoglobin A1c level from 8/4/2021 6.9    Assessment    Assessment:  1. Primary osteoarthritis of left knee    2. Injury of left knee, initial encounter    3. Tear of medial meniscus of left knee, current, unspecified tear type, initial encounter        Plan:  1. Continue over-the-counter medication as needed for discomfort  2. Degenerative medial  meniscal tear in the face of moderate to early severe medial and patellofemoral compartment arthritis--we discussed treatment options and alternatives with the patient.  At this point, I do not believe that arthroscopy is going to give her a large amount of long-term satisfaction and relief.  Her best bet long-term is arthroplasty in the x-rays do not seem to merit that at this point.  Plan will be for physical therapy for quadricep strengthening, medial compartment offloading brace for exercise, and we will attempt to get a course of viscosupplementation approved rather than going with steroid injections because of her diabetes and hemoglobin A1c of 6.9 from August 20 21.  I will see her back for injection #1.        Mateo Burris MD  09/23/21  10:02 [id number]    Dragon disclaimer:  Much of this encounter note is an electronic transcription/translation of spoken language to printed text. The electronic translation of spoken language may permit erroneous, or at times, nonsensical words or phrases to be inadvertently transcribed; Although I have reviewed the note for such errors, some may still exist.

## 2021-09-27 ENCOUNTER — TELEPHONE (OUTPATIENT)
Dept: ENDOCRINOLOGY | Facility: CLINIC | Age: 71
End: 2021-09-27

## 2021-09-27 NOTE — TELEPHONE ENCOUNTER
[facility] Pharm[facility] Refill Outreach Jessee Hoyt    Called regarding refill of medication: Metformin and Valsartan  Spoke with patient.    Assessment  • It looks like it is almost time for your refill, how many doses do you have left? About a week  • How are you doing on the medication, are you experiencing any side effects? Patient denies side effects   • How many doses have you missed since you last filled your prescription? 0 doses missed.  • Have you stopped or started any medications since we last spoke? Patient has had no medication changes since last month.     Shipment   • We can go ahead and coordinate your next refill, would you like to pick this up at the pharmacy or have this delivered to you?  • Patient was advised medication will be shipped via FedEx (standard overnight) on 9/28 with expected delivery on 9/29. Shipping comments patient is requesting of [facility]Ex : n/a. .   • Shipping address confirmed: yes [address] [place] [address] [place] [address]  • Patient is aware and willing to pay copay of $7.40, CCOF.   • Do you have any questions for the pharmacist? No.  • Ensured patient has clinic contact information for questions.     Sheryl Dhillon, Pharmacy Technician  9/27/2021  12:42 EDT

## 2021-09-27 NOTE — TELEPHONE ENCOUNTER
I have reviewed the notes and/or assessments performed by Sheryl Dhillon CPhT (Pharmacy Care Coordinator) and agree with their documentation of this encounter with Renata Lynn.    Lucia Cid PharmD  9/27/2021  15:41 EDT

## 2021-10-11 ENCOUNTER — TELEPHONE (OUTPATIENT)
Dept: PHARMACY | Facility: HOSPITAL | Age: 71
End: 2021-10-11

## 2021-10-11 NOTE — TELEPHONE ENCOUNTER
Called and spoke to patient Renata Lynn. She is due for a refill on her Montelukast. She would like for us to fill it today for delivery tomorrow 10/8. Verified address and copay of $3.70 and advised patient to keep receipts for HSA purposes.

## 2021-10-26 ENCOUNTER — CLINICAL SUPPORT (OUTPATIENT)
Dept: ORTHOPEDIC SURGERY | Facility: CLINIC | Age: 71
End: 2021-10-26

## 2021-10-26 DIAGNOSIS — M17.12 PRIMARY OSTEOARTHRITIS OF LEFT KNEE: Primary | ICD-10-CM

## 2021-10-26 PROCEDURE — 20610 DRAIN/INJ JOINT/BURSA W/O US: CPT | Performed by: ORTHOPAEDIC SURGERY

## 2021-10-26 NOTE — PROGRESS NOTES
Procedure   Large Joint Arthrocentesis: L knee  Date/Time: 10/26/2021 9:49 AM  Consent given by: patient  Site marked: site marked  Timeout: Immediately prior to procedure a time out was called to verify the correct patient, procedure, equipment, support staff and site/side marked as required   Supporting Documentation  Indications: pain   Procedure Details  Location: knee - L knee  Preparation: Patient was prepped and draped in the usual sterile fashion  Needle size: 22 G  Approach: anterolateral  Medications administered: 30 mg Hyaluronan 30 MG/2ML  Patient tolerance: patient tolerated the procedure well with no immediate complications

## 2021-11-01 ENCOUNTER — SPECIALTY PHARMACY (OUTPATIENT)
Dept: ENDOCRINOLOGY | Facility: CLINIC | Age: 71
End: 2021-11-01

## 2021-11-01 NOTE — PROGRESS NOTES
Specialty Pharmacy Refill Coordination Note     Renata is a 70 y.o. female contacted today regarding refills of her specialty medication(s).    Reviewed and verified with patient:      Specialty medication(s) and dose(s) confirmed: yes  Changes to medications: no  Changes to insurance: no    Refill Questions      Most Recent Value   Changes to allergies? No   Changes to medications? No   New conditions since last clinic visit No   Unplanned office visit, urgent care, ED, or hospital admission in the last 4 weeks  No   How does patient/caregiver feel medication is working? Good   Financial problems or insurance changes  No   How many doses of your specialty medications were missed in the last 4 weeks? none          Delivery Questions      Most Recent Value   Delivery method FedEx   Delivery address correct? Yes   Preferred delivery time? Anytime   Number of medications in delivery 8   Medication being filled and delivered Ozempic, Vascepa, Carvedilol, Test Strips, Lancets, Atorvastatin, ASA, HCTZ   Doses left of specialty medications 7   Is there any medication that is due not being filled? No   Supplies needed? No supplies needed   Cooler needed? Yes   Do any medications need mixed or dated? No   Copay form of payment Credit card on file   Questions or concerns for the pharmacist? No   Are any medications first time fills? No        Enrolling patient   Setting up future refill outreaches and assessments  Marked initial benefits investigation, initial assessment, and initial fill as complete         Follow-up: 12/27/2021      Sheryl Dhillon Pharmacy Technician  Specialty Pharmacy Technician

## 2021-11-02 ENCOUNTER — CLINICAL SUPPORT (OUTPATIENT)
Dept: ORTHOPEDIC SURGERY | Facility: CLINIC | Age: 71
End: 2021-11-02

## 2021-11-02 DIAGNOSIS — M17.12 PRIMARY OSTEOARTHRITIS OF LEFT KNEE: Primary | ICD-10-CM

## 2021-11-02 PROCEDURE — 20610 DRAIN/INJ JOINT/BURSA W/O US: CPT | Performed by: ORTHOPAEDIC SURGERY

## 2021-11-02 RX ORDER — LIDOCAINE HYDROCHLORIDE 10 MG/ML
3 INJECTION, SOLUTION EPIDURAL; INFILTRATION; INTRACAUDAL; PERINEURAL
Status: COMPLETED | OUTPATIENT
Start: 2021-11-02 | End: 2021-11-02

## 2021-11-02 RX ADMIN — LIDOCAINE HYDROCHLORIDE 3 ML: 10 INJECTION, SOLUTION EPIDURAL; INFILTRATION; INTRACAUDAL; PERINEURAL at 09:25

## 2021-11-02 NOTE — PROGRESS NOTES
Procedure Note:    I discussed with the patient the potential benefits of performing a therapeutic injections as well as potential risks including but not limited to infection, swelling, pain, bleeding, bruising, nerve/vessel damage, skin color changes, transient elevation in blood glucose levels, and fat atrophy. After informed consent and after the areas were prepped with chlorhexadine soap, ethyl chloride was used to numb the skin. Via the superiorlateral approach, 3mL of 1% lidocaine followed by Orthovisc No. 2 were each injected into the left knee joint. The patient tolerated the procedure well. There were no complications. A sterile dressing was placed over the injection sites.

## 2021-11-02 NOTE — PROGRESS NOTES
Procedure   Large Joint Arthrocentesis: L knee  Date/Time: 11/2/2021 9:25 AM  Consent given by: patient  Site marked: site marked  Timeout: Immediately prior to procedure a time out was called to verify the correct patient, procedure, equipment, support staff and site/side marked as required   Supporting Documentation  Indications: pain   Procedure Details  Location: knee - L knee  Preparation: Patient was prepped and draped in the usual sterile fashion  Needle size: 22 G  Approach: anterolateral  Medications administered: 30 mg Hyaluronan 30 MG/2ML; 3 mL lidocaine PF 1% 1 %  Patient tolerance: patient tolerated the procedure well with no immediate complications

## 2021-11-09 ENCOUNTER — CLINICAL SUPPORT (OUTPATIENT)
Dept: ORTHOPEDIC SURGERY | Facility: CLINIC | Age: 71
End: 2021-11-09

## 2021-11-09 DIAGNOSIS — M17.12 PRIMARY OSTEOARTHRITIS OF LEFT KNEE: Primary | ICD-10-CM

## 2021-11-09 PROCEDURE — 20610 DRAIN/INJ JOINT/BURSA W/O US: CPT | Performed by: ORTHOPAEDIC SURGERY

## 2021-11-09 RX ORDER — LIDOCAINE HYDROCHLORIDE 10 MG/ML
3 INJECTION, SOLUTION EPIDURAL; INFILTRATION; INTRACAUDAL; PERINEURAL
Status: COMPLETED | OUTPATIENT
Start: 2021-11-09 | End: 2021-11-09

## 2021-11-09 RX ADMIN — LIDOCAINE HYDROCHLORIDE 3 ML: 10 INJECTION, SOLUTION EPIDURAL; INFILTRATION; INTRACAUDAL; PERINEURAL at 09:37

## 2021-11-09 NOTE — PROGRESS NOTES
Procedure   Large Joint Arthrocentesis: L knee  Date/Time: 11/9/2021 9:37 AM  Consent given by: patient  Site marked: site marked  Timeout: Immediately prior to procedure a time out was called to verify the correct patient, procedure, equipment, support staff and site/side marked as required   Supporting Documentation  Indications: pain   Procedure Details  Location: knee - L knee  Preparation: Patient was prepped and draped in the usual sterile fashion  Needle size: 22 G  Approach: superior  Medications administered: 30 mg Hyaluronan 30 MG/2ML; 3 mL lidocaine PF 1% 1 %  Aspirate: clear (to verify intraarticular placement of the needle)  Patient tolerance: patient tolerated the procedure well with no immediate complications

## 2021-12-17 ENCOUNTER — OFFICE VISIT (OUTPATIENT)
Dept: ENDOCRINOLOGY | Facility: CLINIC | Age: 71
End: 2021-12-17

## 2021-12-17 VITALS
OXYGEN SATURATION: 97 % | DIASTOLIC BLOOD PRESSURE: 68 MMHG | HEIGHT: 66 IN | HEART RATE: 74 BPM | WEIGHT: 211 LBS | SYSTOLIC BLOOD PRESSURE: 138 MMHG | BODY MASS INDEX: 33.91 KG/M2

## 2021-12-17 DIAGNOSIS — E78.2 MIXED HYPERLIPIDEMIA: ICD-10-CM

## 2021-12-17 DIAGNOSIS — E11.65 UNCONTROLLED TYPE 2 DIABETES MELLITUS WITH HYPERGLYCEMIA (HCC): Primary | ICD-10-CM

## 2021-12-17 DIAGNOSIS — I10 BENIGN HYPERTENSION: ICD-10-CM

## 2021-12-17 LAB
EXPIRATION DATE: ABNORMAL
EXPIRATION DATE: NORMAL
GLUCOSE BLDC GLUCOMTR-MCNC: 177 MG/DL (ref 70–130)
HBA1C MFR BLD: 7.1 %
Lab: ABNORMAL
Lab: NORMAL

## 2021-12-17 PROCEDURE — 99214 OFFICE O/P EST MOD 30 MIN: CPT | Performed by: INTERNAL MEDICINE

## 2021-12-17 PROCEDURE — 3051F HG A1C>EQUAL 7.0%<8.0%: CPT | Performed by: INTERNAL MEDICINE

## 2021-12-17 PROCEDURE — 82947 ASSAY GLUCOSE BLOOD QUANT: CPT | Performed by: INTERNAL MEDICINE

## 2021-12-17 PROCEDURE — 83036 HEMOGLOBIN GLYCOSYLATED A1C: CPT | Performed by: INTERNAL MEDICINE

## 2021-12-17 RX ORDER — ICOSAPENT ETHYL 1000 MG/1
2 CAPSULE ORAL 2 TIMES DAILY
Qty: 360 CAPSULE | Refills: 3 | Status: SHIPPED | OUTPATIENT
Start: 2021-12-17 | End: 2023-01-18 | Stop reason: SDUPTHER

## 2021-12-17 NOTE — PROGRESS NOTES
"     Office Note      Date: 2021  Patient Name: Renata Lynn  MRN: [id number]  : 1950    Chief Complaint   Patient presents with   • Diabetes       History of Present Illness:   Renata Lynn is a 71 y.o. female who presents for Diabetes type 2. Diagnosed in: 2018. Treated in past with oral agents. Current treatments: metformin and ozempic. Number of insulin shots per day: none. Checks blood sugar two times a day. Has low blood sugar: no. Aspirin use: yes. Statin use: Yes and vascepa. ACE-I/ARB use: Yes. Changes in health since last visit: none. Last eye exam 2021.    Subjective      Diabetic Complications:  Eyes: No  Kidneys: No  Feet: No  Heart: No    Diet and Exercise:  Meals per day: 3  Minutes of exercise per week: 0 mins.    Review of Systems:   Review of Systems   Constitutional: Negative.    Cardiovascular: Negative.    Gastrointestinal: Negative.    Endocrine: Negative.        The following portions of the patient's history were reviewed and updated as appropriate: allergies, current medications, past family history, past medical history, past social history, past surgical history and problem list.    Objective       Visit Vitals  /68 (BP Location: Left arm, Patient Position: Sitting, Cuff Size: Adult)   Pulse 74   Ht 167.6 cm (66\")   Wt 95.7 kg (211 lb)   SpO2 97%   BMI 34.06 kg/m²       Physical Exam:  Physical Exam  Constitutional:       Appearance: Normal appearance.   Neurological:      Mental Status: She is alert.         Labs:    HbA1c  Lab Results   Component Value Date    HGBA1C 7.1 2021       CMP  Lab Results   Component Value Date    GLUCOSE 175 (H) 2021    BUN 20 2021    CREATININE 0.74 2021    EGFRIFNONA 78 2021    EGFRIFAFRI 94 2021    BCR 27.0 (H) 2021    K 4.3 2021    CO2 26.3 2021    CALCIUM 10.2 2021    PROTENTOTREF 7.3 2021    LABIL2 1.8 2021    AST 43 (H) 2021    ALT 41 (H) 2021    "     Lipid Panel  Lab Results   Component Value Date    CHLPL 170 08/04/2021    HDL 34 (L) 08/04/2021    LDL 64 08/04/2021    TRIG 471 (H) 08/04/2021        TSH  Lab Results   Component Value Date    TSH 1.940 12/16/2020        Hemoglobin A1C  Lab Results   Component Value Date    HGBA1C 7.1 12/17/2021        Microalbumin/Creatinine  Lab Results   Component Value Date    MALBCRERATIO 22 12/16/2020    CREATINIURIN 29.1 12/16/2020    MICROALBUR 6.5 12/16/2020           Assessment / Plan      Assessment & Plan:  Diagnoses and all orders for this visit:    1. Uncontrolled type 2 diabetes mellitus with hyperglycemia (HCC) (Primary)  Assessment & Plan:  Diabetes is unchanged.  A1c just above goal at 7.1%.  Work on diet/exercise/weight loss.  Continue current treatment regimen.  Diabetes will be reassessed in 3 months.    Orders:  -     POC Glycosylated Hemoglobin (Hb A1C)  -     POC Glucose, Blood    2. Benign hypertension  Assessment & Plan:  Hypertension is improving with treatment.  Continue current treatment regimen.  Blood pressure will be reassessed at the next regular appointment.      3. Mixed hyperlipidemia  Assessment & Plan:  Trigs above goal last visit.  Increase vascepa.  Plan to check lipids again next visit.      Other orders  -     Vascepa 1 g capsule capsule; Take 2 g by mouth 2 (Two) Times a Day.  Dispense: 360 capsule; Refill: 3      Return in about 3 months (around 3/17/2022) for Recheck with A1c, CMP, lipids.    Leroy Whipple MD   12/17/2021

## 2021-12-17 NOTE — ASSESSMENT & PLAN NOTE
Diabetes is unchanged.  A1c just above goal at 7.1%.  Work on diet/exercise/weight loss.  Continue current treatment regimen.  Diabetes will be reassessed in 3 months.

## 2021-12-20 ENCOUNTER — SPECIALTY PHARMACY (OUTPATIENT)
Dept: ENDOCRINOLOGY | Facility: CLINIC | Age: 71
End: 2021-12-20

## 2021-12-20 NOTE — PROGRESS NOTES
Specialty Pharmacy Refill Coordination Note     Renata is a 71 y.o. female contacted today regarding refills of her specialty medication(s).    Specialty medication(s) and dose(s) confirmed: Metformin and Valsartan  Changes to medications: no  Changes to insurance: no  Reviewed and verified with patient:             Delivery Questions      Most Recent Value   Delivery method FedEx   Delivery address correct? Yes   Preferred delivery time? Anytime   Number of medications in delivery 2   Medication being filled and delivered Valsartan and Metformin   Questions or concerns for the pharmacist? No               Follow-up: 90 day(s)     Sheryl Dhillon, Pharmacy Technician  Specialty Pharmacy Technician   12/20/2021   08:55 EST

## 2022-01-03 ENCOUNTER — SPECIALTY PHARMACY (OUTPATIENT)
Dept: ENDOCRINOLOGY | Facility: CLINIC | Age: 72
End: 2022-01-03

## 2022-01-03 NOTE — PROGRESS NOTES
Specialty Pharmacy Refill Coordination Note     Renata is a 71 y.o. female contacted today regarding refills of  Montelukast specialty medication(s).    Reviewed and verified with patient:       Specialty medication(s) and dose(s) confirmed: yes    Refill Questions      Most Recent Value   Changes to allergies? No   Changes to medications? No   New conditions since last clinic visit No   Unplanned office visit, urgent care, ED, or hospital admission in the last 4 weeks  No   How does patient/caregiver feel medication is working? Good   Financial problems or insurance changes  No   Does this patient require a clinical escalation to a pharmacist? No          Delivery Questions      Most Recent Value   Delivery method FedEx   Delivery address correct? Yes   Preferred delivery time? Anytime   Number of medications in delivery 1   Medication being filled and delivered Montelukast   Copay form of payment Credit card on file   Questions or concerns for the pharmacist? No                 Follow-up: 90 day(s)     Sheryl Dhillon CPhT  Pharmacy Care Coordinator  1/3/2022  10:05 EST

## 2022-01-28 ENCOUNTER — SPECIALTY PHARMACY (OUTPATIENT)
Dept: ENDOCRINOLOGY | Facility: CLINIC | Age: 72
End: 2022-01-28

## 2022-01-28 NOTE — PROGRESS NOTES
Specialty Pharmacy Refill Coordination Note     Renata is a 71 y.o. female contacted today regarding refills of her specialty medication(s).    Specialty medication(s) and dose(s) confirmed: Ozempic, Vascepa    Refill Questions      Most Recent Value   Changes to allergies? No   Changes to medications? No   New conditions since last clinic visit No   Unplanned office visit, urgent care, ED, or hospital admission in the last 4 weeks  No   How does patient/caregiver feel medication is working? Good   Financial problems or insurance changes  No   If yes, describe changes in insurance or financial issues. N/A   How many doses of your specialty medications were missed in the last 4 weeks? States none. Has a little bit of excess d/t early fills.   Why were doses missed? N/A   Does this patient require a clinical escalation to a pharmacist? No          Delivery Questions      Most Recent Value   Delivery method FedEx   Delivery address correct? Yes   Preferred delivery time? Anytime   Number of medications in delivery 7   Medication being filled and delivered Ozempic, Vascepa and Lancets, Test Strips, Atrovastatin, Carvedilol, HCTZ   Doses left of specialty medications Vascepa: 5-6 days,  Ozempic: 1 week   Is there any medication that is due not being filled? No   Supplies needed? No supplies needed   Cooler needed? No   Do any medications need mixed or dated? No   Copay form of payment Credit card on file   Questions or concerns for the pharmacist? No   Are any medications first time fills? No          Follow-up: 3/15 for Reassessment (Ozempic, Vascepa) and [facility] Refill Outreach for Non-Specialty Endo Meds: Metformin, Valsartan, ASA    Lucia Cid, PharmD  1/28/2022  14:17 EST

## 2022-02-14 ENCOUNTER — TELEPHONE (OUTPATIENT)
Dept: ORTHOPEDIC SURGERY | Facility: CLINIC | Age: 72
End: 2022-02-14

## 2022-02-14 NOTE — TELEPHONE ENCOUNTER
Provider: FRANCINE  Caller: CASEY MAYFIELD  Relationship to Patient: PATIENT  Pharmacy: N/A  Phone Number: [phone]  Reason for Call: PATIENT CALLING TO SCHEDULE CORTISON INJECTION LT KNEE  When was the patient last seen: 11.9.21

## 2022-02-17 ENCOUNTER — OFFICE VISIT (OUTPATIENT)
Dept: ORTHOPEDIC SURGERY | Facility: CLINIC | Age: 72
End: 2022-02-17

## 2022-02-17 VITALS
DIASTOLIC BLOOD PRESSURE: 80 MMHG | SYSTOLIC BLOOD PRESSURE: 130 MMHG | HEIGHT: 66 IN | BODY MASS INDEX: 32.47 KG/M2 | WEIGHT: 202 LBS

## 2022-02-17 DIAGNOSIS — M17.12 PRIMARY OSTEOARTHRITIS OF LEFT KNEE: Primary | ICD-10-CM

## 2022-02-17 PROCEDURE — 20610 DRAIN/INJ JOINT/BURSA W/O US: CPT | Performed by: ORTHOPAEDIC SURGERY

## 2022-02-17 RX ORDER — LIDOCAINE HYDROCHLORIDE 10 MG/ML
3 INJECTION, SOLUTION EPIDURAL; INFILTRATION; INTRACAUDAL; PERINEURAL
Status: COMPLETED | OUTPATIENT
Start: 2022-02-17 | End: 2022-02-17

## 2022-02-17 RX ORDER — TRIAMCINOLONE ACETONIDE 40 MG/ML
40 INJECTION, SUSPENSION INTRA-ARTICULAR; INTRAMUSCULAR
Status: COMPLETED | OUTPATIENT
Start: 2022-02-17 | End: 2022-02-17

## 2022-02-17 RX ADMIN — LIDOCAINE HYDROCHLORIDE 3 ML: 10 INJECTION, SOLUTION EPIDURAL; INFILTRATION; INTRACAUDAL; PERINEURAL at 08:50

## 2022-02-17 RX ADMIN — TRIAMCINOLONE ACETONIDE 40 MG: 40 INJECTION, SUSPENSION INTRA-ARTICULAR; INTRAMUSCULAR at 08:50

## 2022-02-17 NOTE — PROGRESS NOTES
[facility] Note        Subjective     CC: Follow-up (3 month f/u--Primary osteoarthritis of left knee; last orthovisc injection 11/9/21)      FANG Lynn is a 71 y.o. female.  Patient returns the office today for follow-up of her left knee.  She received an Orthovisc series that was completed on 11/9/2021.  She is gotten great relief from the injection.  She is requesting a steroid injection today.    Overall, patient's symptoms are better but still present    ROS:    Constiutional:Pt denies fever, chills, nausea, or vomiting.  MSK:as above        Objective      Past Medical History  Past Medical History:   Diagnosis Date   • Basal cell carcinoma    • Hypertensive disorder    • Mixed hyperlipidemia    • Obesity     body mass index 30+-obesity   • Type 2 diabetes mellitus, uncontrolled (HCC)        Assessment    Assessment:  1. Primary osteoarthritis of left knee        Plan:  1. Recommend over the counter anti-inflammatories for pain and/or swelling  2. Left knee arthritis--steroid injection will be given today.  Follow-up after May 9, 2022 for consideration of repeat Orthovisc series.    Procedure Note:    I discussed with the patient the potential benefits of performing a therapeutic injections as well as potential risks including but not limited to infection, swelling, pain, bleeding, bruising, nerve/vessel damage, skin color changes, transient elevation in blood glucose levels, and fat atrophy. After informed consent and after the areas were prepped with chlorhexadine soap, ethyl chloride was used to numb the skin. Via the anterolateral approach, 3mL of 1% lidocaine followed by 40mg of Kenalog were each injected into the left knee joint. The patient tolerated the procedure well. There were no complications. A sterile dressing was placed over the injection sites.        Mateo Burris MD  02/17/22  08:49 EST      Dictated Utilizing Dragon Dictation.

## 2022-02-17 NOTE — PROGRESS NOTES
Procedure   Large Joint Arthrocentesis: L knee  Date/Time: 2/17/2022 8:50 AM  Consent given by: patient  Site marked: site marked  Timeout: Immediately prior to procedure a time out was called to verify the correct patient, procedure, equipment, support staff and site/side marked as required   Supporting Documentation  Indications: pain   Procedure Details  Location: knee - L knee  Preparation: Patient was prepped and draped in the usual sterile fashion  Needle size: 25 G  Approach: anterolateral  Medications administered: 40 mg triamcinolone acetonide 40 MG/ML; 3 mL lidocaine PF 1% 1 %  Patient tolerance: patient tolerated the procedure well with no immediate complications

## 2022-02-23 ENCOUNTER — TELEPHONE (OUTPATIENT)
Dept: ORTHOPEDIC SURGERY | Facility: CLINIC | Age: 72
End: 2022-02-23

## 2022-03-16 ENCOUNTER — SPECIALTY PHARMACY (OUTPATIENT)
Dept: ENDOCRINOLOGY | Facility: CLINIC | Age: 72
End: 2022-03-16

## 2022-03-16 NOTE — PROGRESS NOTES
Specialty Pharmacy Patient Management Program  Endocrinology Reassessment     Renata Lynn is a 71 y.o. female with Type 2 Diabetes and Hyperlipidemia seen by an Endocrinology provider and enrolled in the Endocrinology Patient Management program offered by [facility].  A follow-up outreach was conducted, including assessment of continued therapy appropriateness, medication adherence, and side effect incidence and management for Enter Medication Name(s) Here OZEMPIC AND VASCEPA.    Changes to Insurance Coverage or Financial Support  No Changes - [facility]     Relevant Past Medical History and Comorbidities  Relevant medical history and concomitant health conditions were discussed with the patient. The patient's chart has been reviewed for relevant past medical history and comorbid health conditions and updated as necessary.   Past Medical History:   Diagnosis Date   • Basal cell carcinoma    • Hypertensive disorder    • Mixed hyperlipidemia    • Obesity     body mass index 30+-obesity   • Type 2 diabetes mellitus, uncontrolled (HCC)      Social History     Socioeconomic History   • Marital status:    Tobacco Use   • Smoking status: Former Smoker     Years: 20.00     Types: Cigarettes   • Smokeless tobacco: Never Used   Vaping Use   • Vaping Use: Never used   Substance and Sexual Activity   • Alcohol use: Never   • Drug use: Never   • Sexual activity: Defer       Problem list reviewed by Lucia Cid RPH on 3/16/2022 at  3:30 PM    Allergies  Known allergies and reactions were discussed with the patient. The patient's chart has been reviewed for allergy information and updated as necessary.   Penicillins    Allergies reviewed by Lucia Cid RPH on 3/16/2022 at  3:30 PM    Relevant Laboratory Values  A1C Last 3 Results    HGBA1C Last 3 Results 4/6/21 8/4/21 12/17/21   Hemoglobin A1C 7.0 6.9 7.1           Lab Results   Component Value Date    HGBA1C 7.1 12/17/2021     Lab  Results   Component Value Date    GLUCOSE 175 (H) 08/04/2021    CALCIUM 10.2 08/04/2021     08/04/2021    K 4.3 08/04/2021    CO2 26.3 08/04/2021    CL 99 08/04/2021    BUN 20 08/04/2021    CREATININE 0.74 08/04/2021    EGFRIFAFRI 94 08/04/2021    EGFRIFNONA 78 08/04/2021    BCR 27.0 (H) 08/04/2021     Lab Results   Component Value Date    CHLPL 170 08/04/2021    TRIG 471 (H) 08/04/2021    HDL 34 (L) 08/04/2021    LDL 64 08/04/2021         Current Medication List  This medication list has been reviewed with the patient and evaluated for any interactions or necessary modifications/recommendations, and updated to include all prescription medications, OTC medications, and supplements the patient is currently taking.  This list reflects what is contained in the patient's profile, which has also been marked as reviewed to communicate to other providers it is the most up to date version of the patient's current medication therapy.     Current Outpatient Medications:   •  Accu-Chek FastClix Lancets misc, Test blood glucose daily and as needed., Disp: 100 each, Rfl: 12  •  Adult Aspirin Regimen 81 MG EC tablet, Take 1 tablet by mouth Daily., Disp: 90 tablet, Rfl: 1  •  atorvastatin (LIPITOR) 10 MG tablet, Take 1 tablet by mouth every night at bedtime., Disp: 90 tablet, Rfl: 1  •  carvedilol (COREG) 12.5 MG tablet, Take 1 tablet by mouth 2 (Two) Times a Day., Disp: 180 tablet, Rfl: 1  •  esomeprazole (nexIUM) 20 MG capsule, Take 20 mg by mouth Every Morning Before Breakfast., Disp: , Rfl:   •  glucose blood (Accu-Chek Guide) test strip, Test Three Times Daily, Disp: 100 each, Rfl: 5  •  glucose blood (Accu-Chek Guide) test strip, Test blood glucose daily and as needed., Disp: 100 each, Rfl: 12  •  hydroCHLOROthiazide (HYDRODIURIL) 25 MG tablet, Take 1 tablet by mouth Daily., Disp: 90 tablet, Rfl: 1  •  Lacrisert (LACRISERT) 5 MG insert, Daily., Disp: , Rfl:   •  Lacrisert (LACRISERT) 5 MG insert, Insert under each  eyelid daily, Disp: 180 each, Rfl: 3  •  Lancets Micro Thin 33G misc, Use once daily and PRN, Disp: 100 each, Rfl: 1  •  meloxicam (MOBIC) 15 MG tablet, Take 1 tablet by mouth Daily., Disp: 30 tablet, Rfl: 0  •  metFORMIN (GLUCOPHAGE) 1000 MG tablet, Take 1 tablet by mouth 2 (Two) Times a Day With Meals., Disp: 180 tablet, Rfl: 3  •  montelukast (SINGULAIR) 10 MG tablet, Take 1 tablet by mouth Daily., Disp: 90 tablet, Rfl: 1  •  Semaglutide,0.25 or 0.5MG/DOS, (Ozempic, 0.25 or 0.5 MG/DOSE,) 2 MG/1.5ML solution pen-injector, Inject 0.5 mg under the skin into the appropriate area as directed 1 (One) Time Per Week., Disp: 4.5 mL, Rfl: 3  •  valACYclovir (VALTREX) 1000 MG tablet, Take 1 tablet by mouth Daily for three days, as needed., Disp: 30 tablet, Rfl: 0  •  valsartan (DIOVAN) 320 MG tablet, Take 1 tablet by mouth Daily., Disp: 90 tablet, Rfl: 1  •  Vascepa 1 g capsule capsule, Take 2 capsules (2 grams) by mouth 2 (Two) Times a Day., Disp: 360 capsule, Rfl: 3  No current facility-administered medications for this visit.    Medicines reviewed by Lucia Cid RPH on 3/16/2022 at  3:30 PM    Drug Interactions  No Clinically Significant DDIs Noted at This Time on Medication Review    Recommended Medications Assessment  • Aspirin - Currently Taking  • Statin - Currently Taking  • ACEi/ARB - Currently Taking    Adverse Drug Reactions  • Adverse Reactions Experienced: None   • Plan for ADR Management: None Required    Hospitalizations and Urgent Care Since Last Assessment  • Hospitalizations or Admissions: None  • ED Visits: None  • Urgent Office Visits: None    Adherence and Self-Administration  Adherence related patient's specialty therapy was discussed with the patient. The Adherence segment of this outreach has been reviewed and updated.     Medication Adherence    Patient reported X missed doses in the last 7 days: 0  Any gaps in refill history greater than 2 weeks in the last 3 months: no  Demonstrates  understanding of importance of adherence: yes  Informant: patient  Reliability of informant: reliable  Estimated medication adherence level: good  Reasons for non-adherence: no problems identified  Support network for adherence: family member, healthcare provider   Other support networks: Pharmacy          • Ongoing or New Barriers to Patient Self-Administration: None  • Methods for Supporting Patient Self-Administration: None Required; Continue Participation in Pharmacy Patient Management Program     Goals of Therapy  Goals related to the patient's specialty therapy was discussed with the patient. The Patient Goals segment of this outreach has been reviewed and updated.    Goals     • Therapeutic/Clinical Target      HbA1c <7%     3/16/22: No new values since last appointment on 12/17/21 (7.1%). Patient has since been working on diet/exercise and reports being fully adherent to regimen - marking today's progress toward goal on track until next labs, which will be compared to goal on next reassessment. KL         • Therapeutic/Clinical Target      TGs Trending Downward (Goal: <150)     3/16/22: No new values since last labs 08/04//21 (471). Patient has since been working on diet/exercise and reports being fully adherent to regimen - marking today's progress toward goal on track until next labs, which will be compared to goal on next reassessment. KL              Quality of Life Assessment   Quality of Life related to the patient's specialty therapy was discussed with the patient. The QOL segment of this outreach has been reviewed and updated.     Quality of Life Assessment  Quality of Life Improvement Scale: Moderately better    Reassessment Plan & Follow-Up  1. Medication Therapy Changes: None Since Last Provider Visit (Increased Vascepa). Next Appt is 5/27/22.   2. Additional Plans, Therapy Recommendations, or Therapy Problems to Be Addressed: None at This Time  3. Pharmacist to perform regular reassessments no  more than (6) months from the previous assessment.  4. Care Coordinator to set up future refill outreaches, coordinate prescription delivery, and escalate clinical questions to pharmacist.     Attestation  I attest that the specialty medication(s) addressed above are appropriate for the patient based on my reassessment.  If the prescribed therapy is at any point deemed not appropriate based on the current or future assessments, a consultation will be initiated with the patient's specialty care provider to determine the best course of action. The revised plan of therapy will be documented along with any additional patient education provided.     Lucia Cid, PharmD   3/16/2022  15:40 EDT

## 2022-03-29 ENCOUNTER — DOCUMENTATION (OUTPATIENT)
Dept: ENDOCRINOLOGY | Facility: CLINIC | Age: 72
End: 2022-03-29

## 2022-03-29 NOTE — PROGRESS NOTES
Specialty Pharmacy Refill Coordination Note     Renata is a 71 y.o. female contacted today regarding refills of  Montelukast, Metformin, Valsartan, and ASA specialty medication(s).    Reviewed and verified with patient:         Specialty medication(s) and dose(s) confirmed: yes    Verified address and payment information with the patient. She had no questions or concerns for the pharmacist at this time.          Medication Adherence    Support network for adherence: family member, healthcare provider   Other support network: Pharmacy            Follow-up: 90 day(s)  Sheryl Dhillon CPhT  Pharmacy Care Coordinator  3/29/2022  09:25 EDT

## 2022-04-27 ENCOUNTER — SPECIALTY PHARMACY (OUTPATIENT)
Dept: ENDOCRINOLOGY | Facility: CLINIC | Age: 72
End: 2022-04-27

## 2022-04-27 NOTE — PROGRESS NOTES
Specialty Pharmacy Refill Coordination Note     Renata is a 71 y.o. female contacted today regarding refills of  Ozempic and Vascepa specialty medication(s). Also refilled atorvastatin, HCTZ, and carvedilol. Patient did not need her test strips and lancets refilled at this time.    Reviewed and verified with patient:         Specialty medication(s) and dose(s) confirmed: yes    Refill Questions    Flowsheet Row Most Recent Value   Changes to allergies? No   Changes to medications? No   New conditions since last clinic visit No   Unplanned office visit, urgent care, ED, or hospital admission in the last 4 weeks  No   How does patient/caregiver feel medication is working? Very good   Financial problems or insurance changes  No   If yes, describe changes in insurance or financial issues. N/A   How many doses of your specialty medications were missed in the last 4 weeks? 0   Why were doses missed? N/A   Does this patient require a clinical escalation to a pharmacist? No          Delivery Questions    Flowsheet Row Most Recent Value   Delivery method FedEx   Delivery address correct? Yes   Preferred delivery time? Anytime   Number of medications in delivery 5   Medication being filled and delivered Atorvastatin, Vascepa, Ozempic, HCTZ, Carvedilol   Doses left of specialty medications Vascepa=6  Ozempic=1 pen   Is there any medication that is due not being filled? Yes   Medication that does not need to be filled at this time test strips and lancets   Why are other medications not being filled? Patient did not need refilled at this time-moved outreach out 2 weeks   Supplies needed? No supplies needed   Cooler needed? Yes   Do any medications need mixed or dated? No   Copay form of payment Credit card on file   Questions or concerns for the pharmacist? No   Are any medications first time fills? No            Medication Adherence    Support network for adherence: family member, healthcare provider   Other support network:  Pharmacy            Follow-up: 84 day(s)     Maisha Snyder, Pharmacy Technician  Specialty Pharmacy Technician

## 2022-05-11 ENCOUNTER — SPECIALTY PHARMACY (OUTPATIENT)
Dept: ENDOCRINOLOGY | Facility: CLINIC | Age: 72
End: 2022-05-11

## 2022-05-11 NOTE — PROGRESS NOTES
Specialty Pharmacy Refill Coordination Note     Renata is a 71 y.o. female contacted today regarding refills of  Test strips and lancets specialty medication(s).    Reviewed and verified with patient:         Specialty medication(s) and dose(s) confirmed: yes    Refill Questions    Flowsheet Row Most Recent Value   Changes to allergies? No   Changes to medications? No   New conditions since last clinic visit No   Unplanned office visit, urgent care, ED, or hospital admission in the last 4 weeks  No   How does patient/caregiver feel medication is working? Excellent   Financial problems or insurance changes  No   If yes, describe changes in insurance or financial issues. N/A   Since the previous refill, were any specialty medication doses or scheduled injections missed or delayed?  No   If yes, please provide the amount N/A   Why were doses missed? N/A   Does this patient require a clinical escalation to a pharmacist? No          Delivery Questions    Flowsheet Row Most Recent Value   Delivery method FedEx   Delivery address correct? Yes   Preferred delivery time? Anytime   Number of medications in delivery 2   Medication being filled and delivered Test strips and lancets   Doses left of specialty medications patient was unsure   Is there any medication that is due not being filled? No   Supplies needed? No supplies needed   Cooler needed? No   Do any medications need mixed or dated? No   Copay form of payment Payment plan already set up   Questions or concerns for the pharmacist? No   Are any medications first time fills? No            Medication Adherence    Support network for adherence: family member, healthcare provider   Other support network: Pharmacy            Follow-up: 84 day(s)     Maisha Snyder, Pharmacy Technician  Specialty Pharmacy Technician

## 2022-05-17 ENCOUNTER — CLINICAL SUPPORT (OUTPATIENT)
Dept: ORTHOPEDIC SURGERY | Facility: CLINIC | Age: 72
End: 2022-05-17

## 2022-05-17 DIAGNOSIS — M17.12 PRIMARY OSTEOARTHRITIS OF LEFT KNEE: Primary | ICD-10-CM

## 2022-05-17 DIAGNOSIS — S89.92XD INJURY OF LEFT KNEE, SUBSEQUENT ENCOUNTER: ICD-10-CM

## 2022-05-17 DIAGNOSIS — S83.242D TEAR OF MEDIAL MENISCUS OF LEFT KNEE, CURRENT, UNSPECIFIED TEAR TYPE, SUBSEQUENT ENCOUNTER: ICD-10-CM

## 2022-05-17 PROCEDURE — 20610 DRAIN/INJ JOINT/BURSA W/O US: CPT | Performed by: ORTHOPAEDIC SURGERY

## 2022-05-17 RX ORDER — LIDOCAINE HYDROCHLORIDE 10 MG/ML
3 INJECTION, SOLUTION EPIDURAL; INFILTRATION; INTRACAUDAL; PERINEURAL
Status: COMPLETED | OUTPATIENT
Start: 2022-05-17 | End: 2022-05-17

## 2022-05-17 RX ADMIN — LIDOCAINE HYDROCHLORIDE 3 ML: 10 INJECTION, SOLUTION EPIDURAL; INFILTRATION; INTRACAUDAL; PERINEURAL at 09:00

## 2022-05-17 NOTE — PROGRESS NOTES
Procedure   - Large Joint Arthrocentesis: L knee on 5/17/2022 9:00 AM  Indications: pain  Details: 22 G needle, anterolateral approach  Medications: 30 mg Hyaluronan 30 MG/2ML; 3 mL lidocaine PF 1% 1 %  Outcome: tolerated well, no immediate complications  Procedure, treatment alternatives, risks and benefits explained, specific risks discussed. Consent was given by the patient. Immediately prior to procedure a time out was called to verify the correct patient, procedure, equipment, support staff and site/side marked as required. Patient was prepped and draped in the usual sterile fashion.

## 2022-05-24 ENCOUNTER — CLINICAL SUPPORT (OUTPATIENT)
Dept: ORTHOPEDIC SURGERY | Facility: CLINIC | Age: 72
End: 2022-05-24

## 2022-05-24 VITALS
DIASTOLIC BLOOD PRESSURE: 66 MMHG | WEIGHT: 202 LBS | BODY MASS INDEX: 32.47 KG/M2 | SYSTOLIC BLOOD PRESSURE: 118 MMHG | HEIGHT: 66 IN

## 2022-05-24 DIAGNOSIS — M17.12 PRIMARY OSTEOARTHRITIS OF LEFT KNEE: Primary | ICD-10-CM

## 2022-05-24 PROCEDURE — 20610 DRAIN/INJ JOINT/BURSA W/O US: CPT | Performed by: ORTHOPAEDIC SURGERY

## 2022-05-24 RX ORDER — LIDOCAINE HYDROCHLORIDE 10 MG/ML
3 INJECTION, SOLUTION EPIDURAL; INFILTRATION; INTRACAUDAL; PERINEURAL
Status: COMPLETED | OUTPATIENT
Start: 2022-05-24 | End: 2022-05-24

## 2022-05-24 RX ADMIN — LIDOCAINE HYDROCHLORIDE 3 ML: 10 INJECTION, SOLUTION EPIDURAL; INFILTRATION; INTRACAUDAL; PERINEURAL at 08:53

## 2022-05-24 NOTE — PROGRESS NOTES
Procedure   Large Joint Arthrocentesis: L knee  Date/Time: 5/24/2022 8:53 AM  Consent given by: patient  Site marked: site marked  Timeout: Immediately prior to procedure a time out was called to verify the correct patient, procedure, equipment, support staff and site/side marked as required   Supporting Documentation  Indications: pain   Procedure Details  Location: knee - L knee  Preparation: Patient was prepped and draped in the usual sterile fashion  Needle size: 22 G  Approach: anterolateral  Medications administered: 30 mg Hyaluronan 30 MG/2ML; 3 mL lidocaine PF 1% 1 %  Patient tolerance: patient tolerated the procedure well with no immediate complications

## 2022-05-31 ENCOUNTER — CLINICAL SUPPORT (OUTPATIENT)
Dept: ORTHOPEDIC SURGERY | Facility: CLINIC | Age: 72
End: 2022-05-31

## 2022-05-31 DIAGNOSIS — M17.12 PRIMARY OSTEOARTHRITIS OF LEFT KNEE: Primary | ICD-10-CM

## 2022-05-31 PROCEDURE — 20610 DRAIN/INJ JOINT/BURSA W/O US: CPT | Performed by: ORTHOPAEDIC SURGERY

## 2022-05-31 RX ORDER — LIDOCAINE HYDROCHLORIDE 10 MG/ML
3 INJECTION, SOLUTION EPIDURAL; INFILTRATION; INTRACAUDAL; PERINEURAL
Status: COMPLETED | OUTPATIENT
Start: 2022-05-31 | End: 2022-05-31

## 2022-05-31 RX ADMIN — LIDOCAINE HYDROCHLORIDE 3 ML: 10 INJECTION, SOLUTION EPIDURAL; INFILTRATION; INTRACAUDAL; PERINEURAL at 08:51

## 2022-05-31 NOTE — PROGRESS NOTES
Procedure Note:    I discussed with the patient the potential benefits of performing a therapeutic injections as well as potential risks including but not limited to infection, swelling, pain, bleeding, bruising, nerve/vessel damage, skin color changes, transient elevation in blood glucose levels, and fat atrophy. After informed consent and after the areas were prepped with chlorhexadine soap, ethyl chloride was used to numb the skin. Via the superiorlateral approach, 3mL of 1% lidocaine followed by Orthovisc No. 3 were each injected into the left knee joint. The patient tolerated the procedure well. There were no complications. A sterile dressing was placed over the injection sites.      Follow-up in 3 months

## 2022-05-31 NOTE — PROGRESS NOTES
Procedure   Large Joint Arthrocentesis: L knee  Date/Time: 5/31/2022 8:51 AM  Consent given by: patient  Site marked: site marked  Timeout: Immediately prior to procedure a time out was called to verify the correct patient, procedure, equipment, support staff and site/side marked as required   Supporting Documentation  Indications: pain   Procedure Details  Location: knee - L knee  Preparation: Patient was prepped and draped in the usual sterile fashion  Needle size: 22 G  Approach: anterolateral  Medications administered: 30 mg Hyaluronan 30 MG/2ML; 3 mL lidocaine PF 1% 1 %  Patient tolerance: patient tolerated the procedure well with no immediate complications

## 2022-06-21 ENCOUNTER — SPECIALTY PHARMACY (OUTPATIENT)
Dept: ENDOCRINOLOGY | Facility: CLINIC | Age: 72
End: 2022-06-21

## 2022-06-21 NOTE — PROGRESS NOTES
Specialty Pharmacy Patient Management Program  Endocrinology Refill Outreach      Renata is a 71 y.o. female contacted today regarding refills of her medication(s).    Specialty medication(s) and dose(s) confirmed:   Other medications being refilled: Singulair, Metformin, Valsartan, and ASA     Refill Questions    Flowsheet Row Most Recent Value   Changes to allergies? No   Changes to medications? No   New conditions since last clinic visit No   Unplanned office visit, urgent care, ED, or hospital admission in the last 4 weeks  No   How does patient/caregiver feel medication is working? Very good   Financial problems or insurance changes  No   If yes, describe changes in insurance or financial issues. na   Since the previous refill, were any specialty medication doses or scheduled injections missed or delayed?  No   If yes, please provide the amount na   Why were doses missed? na   Does this patient require a clinical escalation to a pharmacist? No          Delivery Questions    Flowsheet Row Most Recent Value   Delivery method FedEx   Delivery address correct? Yes   Delivery phone number [phone]   Preferred delivery time? Anytime   Number of medications in delivery 4   Medication being filled and delivered Metformin, Singulair, ASA, and Valsartan   Doses left of specialty medications na   Is there any medication that is due not being filled? No   Medication that does not need to be filled at this time na   Why are other medications not being filled? na   Supplies needed? No supplies needed   Cooler needed? No   Do any medications need mixed or dated? No   Copay form of payment Credit card on file   Additional comments Copay $45.22   Questions or concerns for the pharmacist? No   Explain any questions or concerns for the pharmacist na   Are any medications first time fills? No   Shipment status Delivery complete          Medication Adherence    Support network for adherence: family member, healthcare  provider   Other support network: Pharmacy              Follow-up: 90 D      Clarisse Plascencia, Care Coordinator   Endocrinology  6/21/2022  12:04 EDT

## 2022-07-19 ENCOUNTER — SPECIALTY PHARMACY (OUTPATIENT)
Dept: ENDOCRINOLOGY | Facility: CLINIC | Age: 72
End: 2022-07-19

## 2022-07-19 RX ORDER — SEMAGLUTIDE 1.34 MG/ML
0.5 INJECTION, SOLUTION SUBCUTANEOUS WEEKLY
Qty: 4.5 ML | Refills: 3 | Status: SHIPPED | OUTPATIENT
Start: 2022-07-19 | End: 2022-12-30 | Stop reason: DRUGHIGH

## 2022-07-19 NOTE — TELEPHONE ENCOUNTER
Patient fills at [facility] and is needing refills on Ozempic. Pended for Dr. Whipple to review/approve if appropriate.     Lucia Cid, PharmD  Clinical Specialty Pharmacist, Endocrinology  7/19/2022  11:05 EDT

## 2022-07-19 NOTE — PROGRESS NOTES
Specialty Pharmacy Patient Management Program  Endocrinology Refill Outreach      Renata is a 71 y.o. female contacted today regarding refills of her medication(s).    Specialty medication(s) and dose(s) confirmed: Ozempic, Vascepa  Other medications being refilled: Atorvastatin, Carvedilol, HCTZ    Refill Questions    Flowsheet Row Most Recent Value   Changes to allergies? No   Changes to medications? No   New conditions since last clinic visit No   Unplanned office visit, urgent care, ED, or hospital admission in the last 4 weeks  No   How does patient/caregiver feel medication is working? Very good   If yes, describe changes in insurance or financial issues. na   Since the previous refill, were any specialty medication doses or scheduled injections missed or delayed?  No   If yes, please provide the amount na   Why were doses missed? na   Does this patient require a clinical escalation to a pharmacist? No          Delivery Questions    Flowsheet Row Most Recent Value   Delivery method FedEx   Delivery address correct? Yes   Delivery phone number [phone]   Preferred delivery time? Anytime   Number of medications in delivery 5   Medication being filled and delivered Atorvastatin, Carvedilol, HCTZ, Vascepa, Ozempic   Doses left of specialty medications Appox 1 Week   Is there any medication that is due not being filled? No   Medication that does not need to be filled at this time na   Why are other medications not being filled? na   Supplies needed? No supplies needed   Cooler needed? Yes   Do any medications need mixed or dated? No   Copay form of payment Credit card on file   Additional comments Copay $137.60   Questions or concerns for the pharmacist? No   Are any medications first time fills? No          Medication Adherence    Support network for adherence: family member, healthcare provider   Other support network: Pharmacy              Follow-up: 90d     Lucia Cid PharmD  Clinical Specialty  Pharmacist, Endocrinology  7/19/2022  11:09 EDT

## 2022-08-08 ENCOUNTER — SPECIALTY PHARMACY (OUTPATIENT)
Dept: ENDOCRINOLOGY | Facility: CLINIC | Age: 72
End: 2022-08-08

## 2022-08-08 NOTE — PROGRESS NOTES
Specialty Pharmacy Patient Management Program  Endocrinology Refill Outreach      Renata is a 71 y.o. female contacted today regarding refills of her medication(s).    Specialty medication(s) and dose(s) confirmed: na  Other medications being refilled: Test strips, Meloxicam, Valtrex     Refill Questions    Flowsheet Row Most Recent Value   Changes to allergies? No   Changes to medications? No   New conditions since last clinic visit No   Unplanned office visit, urgent care, ED, or hospital admission in the last 4 weeks  No   How does patient/caregiver feel medication is working? Very good   Financial problems or insurance changes  No   If yes, describe changes in insurance or financial issues. na   Since the previous refill, were any specialty medication doses or scheduled injections missed or delayed?  No   If yes, please provide the amount na   Why were doses missed? na   Does this patient require a clinical escalation to a pharmacist? No          Delivery Questions    Flowsheet Row Most Recent Value   Delivery method FedEx   Delivery address correct? Yes   Delivery phone number [phone]   Preferred delivery time? Anytime   Number of medications in delivery 3   Medication being filled and delivered Meloxicam, Test Strips, Valtrex   Doses left of specialty medications na   Is there any medication that is due not being filled? No   Medication that does not need to be filled at this time na   Why are other medications not being filled? na   Supplies needed? No supplies needed   Cooler needed? No   Do any medications need mixed or dated? No   Copay form of payment Credit card on file   Additional comments Copay $11.57   Questions or concerns for the pharmacist? No   Explain any questions or concerns for the pharmacist na   Are any medications first time fills? No   Shipment status Delivery complete          Medication Adherence    Support network for adherence: family member, healthcare provider   Other support  network: Pharmacy              Follow-up: 30D     Clarisse Plascencia, Care Coordinator   Endocrinology  8/8/2022  14:13 EDT

## 2022-09-02 ENCOUNTER — SPECIALTY PHARMACY (OUTPATIENT)
Dept: ENDOCRINOLOGY | Facility: CLINIC | Age: 72
End: 2022-09-02

## 2022-09-02 NOTE — PROGRESS NOTES
Specialty Pharmacy Patient Management Program  Endocrinology Refill Outreach      Renata is a 71 y.o. female contacted today regarding refills of her medication(s).    Specialty medication(s) and dose(s) confirmed: None  Other medications being refilled: Meloxicam, Valtrex    Refill Questions    Flowsheet Row Most Recent Value   Changes to allergies? No   Changes to medications? No   New conditions since last clinic visit No   Unplanned office visit, urgent care, ED, or hospital admission in the last 4 weeks  No   How does patient/caregiver feel medication is working? Very good   Financial problems or insurance changes  No   If yes, describe changes in insurance or financial issues. na   Since the previous refill, were any specialty medication doses or scheduled injections missed or delayed?  No   If yes, please provide the amount na   Why were doses missed? na   Does this patient require a clinical escalation to a pharmacist? No          Delivery Questions    Flowsheet Row Most Recent Value   Delivery method FedEx   Delivery address correct? Yes   Delivery phone number [phone]   Preferred delivery time? Anytime   Number of medications in delivery 2   Medication being filled and delivered Valtrex, Meloxicam (Refills Requested 9/2/22)   Doses left of specialty medications na   Is there any medication that is due not being filled? No   Medication that does not need to be filled at this time na   Why are other medications not being filled? na   Supplies needed? No supplies needed   Cooler needed? No   Do any medications need mixed or dated? No   Copay form of payment Credit card on file   Additional comments Copay $11.57   Questions or concerns for the pharmacist? No   Are any medications first time fills? No          Medication Adherence    Support network for adherence: family member, healthcare provider   Other support network: Pharmacy            Follow-up:30d    Lucia Cid PharmD  Clinical Specialty  Pharmacist, Endocrinology  9/2/2022  13:00 EDT

## 2022-09-02 NOTE — PROGRESS NOTES
Specialty Pharmacy Patient Management Program  Endocrinology Reassessment     Renata Lynn is a 71 y.o. female with Type 2 Diabetes and Hyperlipidemia seen by an Endocrinology provider and enrolled in the Endocrinology Patient Management program offered by [facility].  A follow-up outreach was conducted, including assessment of continued therapy appropriateness, medication adherence, and side effect incidence and management for Ozempic and Vascepa.    Changes to Insurance Coverage or Financial Support  None     Relevant Past Medical History and Comorbidities  Relevant medical history and concomitant health conditions were discussed with the patient. The patient's chart has been reviewed for relevant past medical history and comorbid health conditions and updated as necessary.   Past Medical History:   Diagnosis Date   • Basal cell carcinoma    • Hypertensive disorder    • Mixed hyperlipidemia    • Obesity     body mass index 30+-obesity   • Type 2 diabetes mellitus, uncontrolled      Social History     Socioeconomic History   • Marital status:    Tobacco Use   • Smoking status: Former Smoker     Years: 20.00     Types: Cigarettes   • Smokeless tobacco: Never Used   Vaping Use   • Vaping Use: Never used   Substance and Sexual Activity   • Alcohol use: Never   • Drug use: Never   • Sexual activity: Defer       Problem list reviewed by Lucia Cid RPH on 9/2/2022 at 12:51 PM    Allergies  Known allergies and reactions were discussed with the patient. The patient's chart has been reviewed for allergy information and updated as necessary.   Penicillins    Allergies reviewed by Lucia Cid RPH on 9/2/2022 at 12:51 PM    Relevant Laboratory Values  A1C Last 3 Results    HGBA1C Last 3 Results 12/17/21   Hemoglobin A1C 7.1           Lab Results   Component Value Date    HGBA1C 7.1 12/17/2021     Lab Results   Component Value Date    GLUCOSE 175 (H) 08/04/2021    CALCIUM 10.2  08/04/2021     08/04/2021    K 4.3 08/04/2021    CO2 26.3 08/04/2021    CL 99 08/04/2021    BUN 20 08/04/2021    CREATININE 0.74 08/04/2021    EGFRIFAFRI 94 08/04/2021    EGFRIFNONA 78 08/04/2021    BCR 27.0 (H) 08/04/2021     Lab Results   Component Value Date    CHLPL 170 08/04/2021    TRIG 471 (H) 08/04/2021    HDL 34 (L) 08/04/2021    LDL 64 08/04/2021         Current Medication List  This medication list has been reviewed with the patient and evaluated for any interactions or necessary modifications/recommendations, and updated to include all prescription medications, OTC medications, and supplements the patient is currently taking.  This list reflects what is contained in the patient's profile, which has also been marked as reviewed to communicate to other providers it is the most up to date version of the patient's current medication therapy.     Current Outpatient Medications:   •  Accu-Chek FastClix Lancets misc, Test blood glucose daily and as needed., Disp: 100 each, Rfl: 12  •  Adult Aspirin Regimen 81 MG EC tablet, Take 1 tablet by mouth Daily., Disp: 90 tablet, Rfl: 1  •  atorvastatin (LIPITOR) 10 MG tablet, Take 1 tablet by mouth every night at bedtime., Disp: 90 tablet, Rfl: 1  •  carvedilol (COREG) 12.5 MG tablet, Take 1 tablet by mouth 2 (Two) Times a Day., Disp: 180 tablet, Rfl: 1  •  cefdinir (OMNICEF) 300 MG capsule, Take 1 capsule by mouth 2 (Two) Times a Day., Disp: 20 capsule, Rfl: 0  •  esomeprazole (nexIUM) 20 MG capsule, Take 20 mg by mouth Every Morning Before Breakfast., Disp: , Rfl:   •  glucose blood (Accu-Chek Guide) test strip, Test Three Times Daily, Disp: 100 each, Rfl: 5  •  glucose blood (Accu-Chek Guide) test strip, Test blood glucose daily and as needed. (Patient taking differently: Test blood glucose daily and as needed.), Disp: 100 each, Rfl: 12  •  hydroCHLOROthiazide (HYDRODIURIL) 25 MG tablet, Take 1 tablet by mouth Daily., Disp: 90 tablet, Rfl: 1  •  ipratropium  (ATROVENT) 0.06 % nasal spray, Use 1 spray in the nostril(s) three times daily, Disp: 15 mL, Rfl: 3  •  Lacrisert (LACRISERT) 5 MG insert, Daily., Disp: , Rfl:   •  Lacrisert (LACRISERT) 5 MG insert, Insert under each eyelid daily, Disp: 180 each, Rfl: 3  •  Lancets Micro Thin 33G misc, Use once daily and PRN, Disp: 100 each, Rfl: 1  •  meloxicam (MOBIC) 15 MG tablet, Take 1 tablet by mouth Daily., Disp: 30 tablet, Rfl: 0  •  meloxicam (MOBIC) 15 MG tablet, Take 1 tablet by mouth Daily., Disp: 30 tablet, Rfl: 0  •  metFORMIN (GLUCOPHAGE) 1000 MG tablet, Take 1 tablet by mouth 2 (Two) Times a Day With Meals., Disp: 180 tablet, Rfl: 3  •  montelukast (SINGULAIR) 10 MG tablet, Take 1 tablet by mouth Daily., Disp: 90 tablet, Rfl: 1  •  mupirocin (BACTROBAN) 2 % ointment, Apply a small amount to affected area three times a day, Disp: 22 g, Rfl: 1  •  ofloxacin (OCUFLOX) 0.3 % ophthalmic solution, Apply 5 drops to left ear twice daily, Disp: 10 mL, Rfl: 1  •  Semaglutide,0.25 or 0.5MG/DOS, (Ozempic, 0.25 or 0.5 MG/DOSE,) 2 MG/1.5ML solution pen-injector, Inject 0.5 mg under the skin into the appropriate area as directed 1 (One) Time Per Week., Disp: 4.5 mL, Rfl: 3  •  valACYclovir (VALTREX) 1000 MG tablet, Take 1 tablet by mouth Daily for three days, as needed., Disp: 30 tablet, Rfl: 0  •  valACYclovir (VALTREX) 1000 MG tablet, Take 1 tablet by mouth Daily for 3 days  As Needed., Disp: 30 tablet, Rfl: 0  •  valsartan (DIOVAN) 320 MG tablet, Take 1 tablet by mouth Daily., Disp: 90 tablet, Rfl: 1  •  Vascepa 1 g capsule capsule, Take 2 capsules (2 grams) by mouth 2 (Two) Times a Day., Disp: 360 capsule, Rfl: 3    Medicines reviewed by Lucia Cid RPH on 9/2/2022 at 12:51 PM    Drug Interactions  No Clinically Significant DDIs Noted at Present Time on Medication Review    Recommended Medications Assessment  • Aspirin - Currently Taking   • Statin - Currently Taking   • ACEi/ARB - Currently Taking     Adverse Drug  Reactions  • Adverse Reactions Experienced: None Reported   • Plan for ADR Management: Not Required At This Time    Hospitalizations and Urgent Care Since Last Assessment  • Hospitalizations or Admissions: None Reported  • ED Visits: None Reported  • Urgent Office Visits: None Reported    Adherence, Self-Administration, and Current Therapy Problems  Adherence related patient's specialty therapy was discussed with the patient. The Adherence segment of this outreach has been reviewed and updated.     Medication Adherence    Support network for adherence: family member, healthcare provider   Other support networks: Pharmacy          • Ongoing or New Barriers to Patient Self-Administration: None  • Methods for Supporting Patient Self-Administration: Not Required At This Time     Medication Therapy Recommendations  No medication therapy recommendations to display     Goals of Therapy  Goals related to the patient's specialty therapy was discussed with the patient. The Patient Goals segment of this outreach has been reviewed and updated.    Goals     • Therapeutic/Clinical Target      HbA1c <7%     3/16/22: No new values since last appointment on 12/17/21 (7.1%). Patient has since been working on diet/exercise and reports being fully adherent to regimen - marking today's progress toward goal on track until next labs, which will be compared to goal on next reassessment. KL     9/2/22: No new values since last appointment on 12/17/21 (7.1%). Patient continues to work on diet/exercise and reports adherence. Patient had to cancel most recent scheduled appointment on 8/22/22. Next appointment in December. Patient will have new value at that time. Rescheduling next assessment early to align with this appointment.  KL       • Therapeutic/Clinical Target      TGs Trending Downward (Goal: <150)     3/16/22: No new values since last labs 08/04//21 (471). Patient has since been working on diet/exercise and reports being fully  adherent to regimen - marking today's progress toward goal on track until next labs, which will be compared to goal on next reassessment. KL     9/2/22: No new values since last labs 08/04//21 (471). Patient has since been working on diet/exercise and reports being fully adherent to regimen - marking today's progress toward goal on track until next labs, which will be compared to goal on next reassessment. Patient canceled appt on 8/22/22, next appt in December 2022. Aligning next refill to that appointment to reassess labs and progress toward goal at that time. KL           Quality of Life Assessment   Quality of Life related to the patient's specialty therapy was discussed with the patient. The QOL segment of this outreach has been reviewed and updated.     Quality of Life Assessment  Quality of Life Improvement Scale: Moderately better    Reassessment Plan & Follow-Up  1. Medication Therapy Changes: None Since Last Visit. Next Visit 12/30/22   2. Additional Plans, Therapy Recommendations, or Therapy Problems to Be Addressed: None  3. Pharmacist to perform regular reassessments no more than (6) months from the previous assessment.  4. Care Coordinator to set up future refill outreaches, coordinate prescription delivery, and escalate clinical questions to pharmacist.     Attestation  I attest that the specialty medication(s) addressed above are appropriate for the patient based on my reassessment.  If the prescribed therapy is at any point deemed not appropriate based on the current or future assessments, a consultation will be initiated with the patient's specialty care provider to determine the best course of action. The revised plan of therapy will be documented along with any additional patient education provided.     Lucia Cid, Karen   Clinical Specialty Pharmacist, Endocrinology  9/2/2022  13:01 EDT

## 2022-09-13 ENCOUNTER — OFFICE VISIT (OUTPATIENT)
Dept: ORTHOPEDIC SURGERY | Facility: CLINIC | Age: 72
End: 2022-09-13

## 2022-09-13 VITALS
WEIGHT: 212 LBS | DIASTOLIC BLOOD PRESSURE: 90 MMHG | HEIGHT: 66 IN | BODY MASS INDEX: 34.07 KG/M2 | SYSTOLIC BLOOD PRESSURE: 188 MMHG

## 2022-09-13 DIAGNOSIS — M17.12 PRIMARY OSTEOARTHRITIS OF LEFT KNEE: Primary | ICD-10-CM

## 2022-09-13 PROCEDURE — 99212 OFFICE O/P EST SF 10 MIN: CPT | Performed by: ORTHOPAEDIC SURGERY

## 2022-09-13 NOTE — PROGRESS NOTES
"    [facility] Note        Subjective     CC: Follow-up (3 month recheck - Primary osteoarthritis of left knee)      FANG Lynn is a 71 y.o. female.  Patient returns for follow-up of her left knee arthritis.  Completed an Orthovisc series on 5/31/2022.  She is doing quite well from it currently.    Overall, patient's symptoms are as above.    ROS:    Constiutional:Pt denies fever, chills, nausea, or vomiting.  MSK:as above        Objective      Past Medical History  Past Medical History:   Diagnosis Date   • Basal cell carcinoma    • Hypertensive disorder    • Mixed hyperlipidemia    • Obesity     body mass index 30+-obesity   • Type 2 diabetes mellitus, uncontrolled          Physical Exam  BP (!) 188/90   Ht 167.6 cm (65.98\")   Wt 96.2 kg (212 lb)   BMI 34.23 kg/m²     Body mass index is 34.23 kg/m².    Patient is well nourished and well developed.        Ortho Exam      Musculoskeletal:  Global Assessment:  Overall assessment of Lower Extremity Muscle Strength and Tone:  Left quadriceps--5/5   Left hamstrings--5/5       Left tibialis anterior--5/5  Left gastroc-soleus--5/5  Left EHL --5/5    Lower Extremity:    Inspection and Palpation:  Left knee:  Tenderness:  Over the medial joint line and moderate severity  Effusion:  1+  Crepitus:  Positive  Pulses:  2+  Ecchymosis:  None  Warmth:  None     ROM:  Left:  Extension: 5     Flexion:120    Instability:    Left:    Lachman Test:  Negative   Varus stress test negative  Valgus stress test negative    Deformities/Malalignments/Discrepancies:    Left:  Genu Varum     Functional Testing:  Alexandra's test:  Negative  Patella grind test:  Positive  Q-angle:  normal      Imaging/Labs/EMG Reviewed:  Imaging Results (Last 24 Hours)     ** No results found for the last 24 hours. **            Assessment    Assessment:  1. Primary osteoarthritis of left knee        Plan:  1. Recommend over the counter anti-inflammatories for pain and/or " swelling  2. Left knee arthritis--we will defer on any injections today.  See her back hopefully on 12/1/2022 to initiate another course of Orthovisc.  That will be ordered today.      Mateo Burris MD  09/13/22  15:47 EDT      Dictated Utilizing Dragon Dictation.

## 2022-09-26 ENCOUNTER — SPECIALTY PHARMACY (OUTPATIENT)
Dept: PHARMACY | Facility: HOSPITAL | Age: 72
End: 2022-09-26

## 2022-09-26 RX ORDER — LANCETS
1 EACH MISCELLANEOUS DAILY
Qty: 300 EACH | Refills: 3 | Status: SHIPPED | OUTPATIENT
Start: 2022-09-26

## 2022-09-26 NOTE — TELEPHONE ENCOUNTER
Specialty Pharmacy Patient Management Program  Prescription Refill Request     Patient currently fills medications at  Pharmacy. Needing refill(s) on metformin, test strips and lancets.  Pended for Dr. Whipple to review/approve as appropriate.       Delroy Cates, PharmD, MPH  Clinical Specialty Pharmacist, Endocrinology  9/26/2022  10:18 EDT

## 2022-09-26 NOTE — PROGRESS NOTES
Specialty Pharmacy Patient Management Program  Endocrinology Refill Outreach      Renata is a 71 y.o. female contacted today regarding refills of her medication(s).    Specialty medication(s) and dose(s) confirmed: na  Other medications being refilled: Singulair, Metformin, Valsartan, ASA    Refill Questions    Flowsheet Row Most Recent Value   Changes to allergies? No   Changes to medications? No   New conditions since last clinic visit No   Unplanned office visit, urgent care, ED, or hospital admission in the last 4 weeks  No   How does patient/caregiver feel medication is working? Very good   Financial problems or insurance changes  No   If yes, describe changes in insurance or financial issues. NA   Since the previous refill, were any specialty medication doses or scheduled injections missed or delayed?  No   If yes, please provide the amount NA   Why were doses missed? NA   Does this patient require a clinical escalation to a pharmacist? No          Delivery Questions    Flowsheet Row Most Recent Value   Delivery method FedEx   Delivery address correct? Yes   Delivery phone number [phone]   Preferred delivery time? Anytime   Number of medications in delivery 4   Medication being filled and delivered Singulair, Metformin, Valsartan, ASA   Doses left of specialty medications na   Is there any medication that is due not being filled? No   Medication that does not need to be filled at this time na   Why are other medications not being filled? na   Supplies needed? No supplies needed   Cooler needed? No   Do any medications need mixed or dated? No   Copay form of payment Credit card on file   Additional comments Copay $45.22   Questions or concerns for the pharmacist? No   Explain any questions or concerns for the pharmacist na   Are any medications first time fills? No   Shipment status Delivery complete          Medication Adherence    Support network for adherence: family member, healthcare provider    Other support network: Pharmacy              Follow-up: 90 Days      Clarisse Plascencia, Care Coordinator   Endocrinology  9/26/2022  11:49 EDT

## 2022-09-27 ENCOUNTER — APPOINTMENT (OUTPATIENT)
Dept: GENERAL RADIOLOGY | Facility: HOSPITAL | Age: 72
End: 2022-09-27

## 2022-09-27 ENCOUNTER — HOSPITAL ENCOUNTER (EMERGENCY)
Facility: HOSPITAL | Age: 72
Discharge: HOME | End: 2022-09-27
Attending: EMERGENCY MEDICINE | Admitting: EMERGENCY MEDICINE

## 2022-09-27 VITALS
DIASTOLIC BLOOD PRESSURE: 92 MMHG | RESPIRATION RATE: 20 BRPM | TEMPERATURE: 98.5 F | HEIGHT: 66 IN | HEART RATE: 98 BPM | BODY MASS INDEX: 34.55 KG/M2 | OXYGEN SATURATION: 91 % | SYSTOLIC BLOOD PRESSURE: 126 MMHG | WEIGHT: 215 LBS

## 2022-09-27 DIAGNOSIS — I48.91 ATRIAL FIBRILLATION WITH RAPID VENTRICULAR RESPONSE: Primary | ICD-10-CM

## 2022-09-27 DIAGNOSIS — R73.9 HYPERGLYCEMIA: ICD-10-CM

## 2022-09-27 DIAGNOSIS — E83.42 HYPOMAGNESEMIA: ICD-10-CM

## 2022-09-27 LAB
ALBUMIN SERPL-MCNC: 4.4 G/DL (ref 3.5–5.2)
ALBUMIN/GLOB SERPL: 1.5 G/DL
ALP SERPL-CCNC: 106 U/L (ref 39–117)
ALT SERPL W P-5'-P-CCNC: 67 U/L (ref 1–33)
ANION GAP SERPL CALCULATED.3IONS-SCNC: 12 MMOL/L (ref 5–15)
AST SERPL-CCNC: 77 U/L (ref 1–32)
BASOPHILS # BLD AUTO: 0.04 10*3/MM3 (ref 0–0.2)
BASOPHILS NFR BLD AUTO: 0.4 % (ref 0–1.5)
BILIRUB SERPL-MCNC: 0.4 MG/DL (ref 0–1.2)
BILIRUB UR QL STRIP: NEGATIVE
BUN SERPL-MCNC: 16 MG/DL (ref 8–23)
BUN/CREAT SERPL: 21.3 (ref 7–25)
CALCIUM SPEC-SCNC: 10 MG/DL (ref 8.6–10.5)
CHLORIDE SERPL-SCNC: 98 MMOL/L (ref 98–107)
CLARITY UR: CLEAR
CO2 SERPL-SCNC: 25 MMOL/L (ref 22–29)
COLOR UR: YELLOW
CREAT SERPL-MCNC: 0.75 MG/DL (ref 0.57–1)
D DIMER PPP FEU-MCNC: 0.4 MCGFEU/ML (ref 0.01–0.5)
DEPRECATED RDW RBC AUTO: 45.1 FL (ref 37–54)
EGFRCR SERPLBLD CKD-EPI 2021: 85.8 ML/MIN/1.73
EOSINOPHIL # BLD AUTO: 0.11 10*3/MM3 (ref 0–0.4)
EOSINOPHIL NFR BLD AUTO: 1.1 % (ref 0.3–6.2)
ERYTHROCYTE [DISTWIDTH] IN BLOOD BY AUTOMATED COUNT: 14.1 % (ref 12.3–15.4)
GLOBULIN UR ELPH-MCNC: 2.9 GM/DL
GLUCOSE SERPL-MCNC: 375 MG/DL (ref 65–99)
GLUCOSE UR STRIP-MCNC: ABNORMAL MG/DL
HCT VFR BLD AUTO: 39.6 % (ref 34–46.6)
HGB BLD-MCNC: 13.3 G/DL (ref 12–15.9)
HGB UR QL STRIP.AUTO: NEGATIVE
HOLD SPECIMEN: NORMAL
IMM GRANULOCYTES # BLD AUTO: 0.04 10*3/MM3 (ref 0–0.05)
IMM GRANULOCYTES NFR BLD AUTO: 0.4 % (ref 0–0.5)
KETONES UR QL STRIP: ABNORMAL
LEUKOCYTE ESTERASE UR QL STRIP.AUTO: NEGATIVE
LYMPHOCYTES # BLD AUTO: 2.23 10*3/MM3 (ref 0.7–3.1)
LYMPHOCYTES NFR BLD AUTO: 22.1 % (ref 19.6–45.3)
MAGNESIUM SERPL-MCNC: 1.5 MG/DL (ref 1.6–2.4)
MCH RBC QN AUTO: 29.2 PG (ref 26.6–33)
MCHC RBC AUTO-ENTMCNC: 33.6 G/DL (ref 31.5–35.7)
MCV RBC AUTO: 87 FL (ref 79–97)
MONOCYTES # BLD AUTO: 0.63 10*3/MM3 (ref 0.1–0.9)
MONOCYTES NFR BLD AUTO: 6.2 % (ref 5–12)
NEUTROPHILS NFR BLD AUTO: 69.8 % (ref 42.7–76)
NEUTROPHILS NFR BLD AUTO: 7.04 10*3/MM3 (ref 1.7–7)
NITRITE UR QL STRIP: NEGATIVE
NRBC BLD AUTO-RTO: 0 /100 WBC (ref 0–0.2)
PH UR STRIP.AUTO: <=5 [PH] (ref 5–8)
PLATELET # BLD AUTO: 260 10*3/MM3 (ref 140–450)
PMV BLD AUTO: 10.8 FL (ref 6–12)
POTASSIUM SERPL-SCNC: 4.6 MMOL/L (ref 3.5–5.2)
PROT SERPL-MCNC: 7.3 G/DL (ref 6–8.5)
PROT UR QL STRIP: ABNORMAL
QT INTERVAL: 322 MS
QT INTERVAL: 380 MS
QT INTERVAL: 390 MS
QTC INTERVAL: 504 MS
QTC INTERVAL: 515 MS
QTC INTERVAL: 552 MS
RBC # BLD AUTO: 4.55 10*6/MM3 (ref 3.77–5.28)
SODIUM SERPL-SCNC: 135 MMOL/L (ref 136–145)
SP GR UR STRIP: 1.02 (ref 1–1.03)
TROPONIN T SERPL-MCNC: <0.01 NG/ML (ref 0–0.03)
UROBILINOGEN UR QL STRIP: ABNORMAL
WBC NRBC COR # BLD: 10.09 10*3/MM3 (ref 3.4–10.8)
WHOLE BLOOD HOLD COAG: NORMAL
WHOLE BLOOD HOLD SPECIMEN: NORMAL

## 2022-09-27 PROCEDURE — 99284 EMERGENCY DEPT VISIT MOD MDM: CPT

## 2022-09-27 PROCEDURE — 84484 ASSAY OF TROPONIN QUANT: CPT | Performed by: EMERGENCY MEDICINE

## 2022-09-27 PROCEDURE — 85379 FIBRIN DEGRADATION QUANT: CPT | Performed by: EMERGENCY MEDICINE

## 2022-09-27 PROCEDURE — 80053 COMPREHEN METABOLIC PANEL: CPT | Performed by: EMERGENCY MEDICINE

## 2022-09-27 PROCEDURE — 36415 COLL VENOUS BLD VENIPUNCTURE: CPT

## 2022-09-27 PROCEDURE — 81003 URINALYSIS AUTO W/O SCOPE: CPT | Performed by: EMERGENCY MEDICINE

## 2022-09-27 PROCEDURE — 83735 ASSAY OF MAGNESIUM: CPT | Performed by: EMERGENCY MEDICINE

## 2022-09-27 PROCEDURE — 85025 COMPLETE CBC W/AUTO DIFF WBC: CPT | Performed by: EMERGENCY MEDICINE

## 2022-09-27 PROCEDURE — 71045 X-RAY EXAM CHEST 1 VIEW: CPT

## 2022-09-27 PROCEDURE — 93005 ELECTROCARDIOGRAM TRACING: CPT | Performed by: EMERGENCY MEDICINE

## 2022-09-27 RX ORDER — SODIUM CHLORIDE 0.9 % (FLUSH) 0.9 %
10 SYRINGE (ML) INJECTION AS NEEDED
Status: DISCONTINUED | OUTPATIENT
Start: 2022-09-27 | End: 2022-09-27 | Stop reason: HOSPADM

## 2022-09-27 RX ORDER — DILTIAZEM HCL IN NACL,ISO-OSM 125 MG/125
5-15 PLASTIC BAG, INJECTION (ML) INTRAVENOUS
Status: DISCONTINUED | OUTPATIENT
Start: 2022-09-27 | End: 2022-09-27 | Stop reason: HOSPADM

## 2022-09-27 RX ORDER — DILTIAZEM HYDROCHLORIDE 5 MG/ML
10 INJECTION INTRAVENOUS ONCE
Status: DISCONTINUED | OUTPATIENT
Start: 2022-09-27 | End: 2022-09-27 | Stop reason: HOSPADM

## 2022-09-27 RX ADMIN — APIXABAN 5 MG: 5 TABLET, FILM COATED ORAL at 11:35

## 2022-09-30 ENCOUNTER — OFFICE VISIT (OUTPATIENT)
Dept: CARDIOLOGY | Facility: HOSPITAL | Age: 72
End: 2022-09-30

## 2022-09-30 VITALS
HEIGHT: 66 IN | BODY MASS INDEX: 33.85 KG/M2 | HEART RATE: 88 BPM | OXYGEN SATURATION: 95 % | RESPIRATION RATE: 18 BRPM | SYSTOLIC BLOOD PRESSURE: 151 MMHG | DIASTOLIC BLOOD PRESSURE: 68 MMHG | WEIGHT: 210.6 LBS | TEMPERATURE: 97.8 F

## 2022-09-30 DIAGNOSIS — E83.42 HYPOMAGNESEMIA: ICD-10-CM

## 2022-09-30 DIAGNOSIS — E66.9 OBESITY (BMI 30.0-34.9): ICD-10-CM

## 2022-09-30 DIAGNOSIS — I48.0 PAF (PAROXYSMAL ATRIAL FIBRILLATION): Primary | ICD-10-CM

## 2022-09-30 DIAGNOSIS — I10 PRIMARY HYPERTENSION: ICD-10-CM

## 2022-09-30 DIAGNOSIS — E78.5 HYPERLIPIDEMIA, UNSPECIFIED HYPERLIPIDEMIA TYPE: ICD-10-CM

## 2022-09-30 DIAGNOSIS — I20.8 ANGINAL EQUIVALENT: ICD-10-CM

## 2022-09-30 PROCEDURE — 99204 OFFICE O/P NEW MOD 45 MIN: CPT | Performed by: NURSE PRACTITIONER

## 2022-09-30 RX ORDER — VALSARTAN 320 MG/1
160 TABLET ORAL DAILY
Qty: 90 TABLET | Refills: 0
Start: 2022-09-30 | End: 2022-10-12 | Stop reason: SDUPTHER

## 2022-09-30 RX ORDER — CARVEDILOL 25 MG/1
25 TABLET ORAL 2 TIMES DAILY
Qty: 180 TABLET | Refills: 3 | Status: SHIPPED | OUTPATIENT
Start: 2022-09-30

## 2022-09-30 NOTE — PROGRESS NOTES
[facility], [facility]    Chief Complaint  Atrial Fibrillation (ED follow up for newly dx Afib. )    Subjective    History of Present Illness {CC  Problem List  Visit  Diagnosis   Encounters  Notes  Medications  Labs  Result Review Imaging  Media :23}     Renata Lynn presents to [facility] CARDIOLOG[facility] for   History of Present Illness     71-year-old female With history of hypertension, hyperlipidemia, diabetes type 2, obesity, GERD, PAF, CLEMENTE on CPap compliant.     Currently with left knee pain (torn meniscus).  Decreased activity tolerance with leg and knee pain.  Unsteady gait.     COVID 1 month ago with fatigue.     Patient presented to [facility] ED on 9/27/2022 with palpitations and dizziness.  She was found to have new diagnosis of atrial fibrillation with RVR.  She spontaneously converted back to sinus rhythm in the emergency room.  She was prescribed Eliquis.  Currently on carvedilol.  Found to have Hypomagnesemia.    Patient reports she will take 2 caffeinated beverages per day.  No alcohol use.  Denies recent steroids or antihistamine use.  No history of illicit drug use.    S/s associated with afib:  Racing heart rate, chest discomfort/pressure, dizziness, nauseated.  No near syncope, syncope, edema.     GERD is fairly well controlled.     No hx of ischemic evaluation.  NO hx of CVA/TIA.  No family hx of CAD, CVA, arrythmia.      Cardiac risk factors:  History of  dyslipidemia, hypertension, diabetes.  Tobacco use (remote), sedentary lifestyle, obesity (BMI > 30), age (older than 55 for men, 65 for women)          Objective     Vital Signs:   Vitals:    09/30/22 0850 09/30/22 0851 09/30/22 0852   BP: 178/73 155/70 151/68   BP Location: Right arm Left arm Left arm   Patient Position: Sitting Standing Sitting   Cuff Size: Large Adult Adult Adult   Pulse: 87 88 88   Resp:   18   Temp: 97.8 °F (36.6 °C) 97.8 °F (36.6 °C) 97.8 °F  "(36.6 °C)   TempSrc: Temporal Temporal Temporal   SpO2: 97% 95% 95%   Weight:   95.5 kg (210 lb 9.6 oz)   Height:   167.6 cm (66\")     Body mass index is 33.99 kg/m².  Physical Exam  Vitals reviewed.   Constitutional:       General: She is not in acute distress.     Appearance: Normal appearance.   Cardiovascular:      Rate and Rhythm: Normal rate and regular rhythm.      Pulses:           Radial pulses are 2+ on the right side.        Dorsalis pedis pulses are 2+ on the right side.        Posterior tibial pulses are 2+ on the right side.      Heart sounds: Normal heart sounds.   Pulmonary:      Effort: Pulmonary effort is normal.      Breath sounds: Normal breath sounds.   Musculoskeletal:      Right lower leg: No edema.      Left lower leg: No edema.   Skin:     General: Skin is warm and dry.   Neurological:      Mental Status: She is alert.   Psychiatric:         Mood and Affect: Mood normal.         Behavior: Behavior is cooperative.              Result Review  Data Reviewed:{ Labs  Result Review  Imaging  Med Tab  Media :23}   EKG 9/27/2022: Sinus tachycardia, right bundle branch block, left anterior fascicular block    EKG 9/27/2022: Atrial fibrillation with  bpm, right bundle branch block, left anterior fascicular block.    XR 09/27/22:  No active disease    Admission on 09/27/2022, Discharged on 09/27/2022   Component Date Value Ref Range Status   • QT Interval 09/27/2022 322  ms Final   • QTC Interval 09/27/2022 552  ms Final   • Glucose 09/27/2022 375 (A) 65 - 99 mg/dL Final   • BUN 09/27/2022 16  8 - 23 mg/dL Final   • Creatinine 09/27/2022 0.75  0.57 - 1.00 mg/dL Final   • Sodium 09/27/2022 135 (A) 136 - 145 mmol/L Final   • Potassium 09/27/2022 4.6  3.5 - 5.2 mmol/L Final    Slight hemolysis detected by analyzer. Results may be affected.   • Chloride 09/27/2022 98  98 - 107 mmol/L Final   • CO2 09/27/2022 25.0  22.0 - 29.0 mmol/L Final   • Calcium 09/27/2022 10.0  8.6 - 10.5 mg/dL Final   • " Total Protein 09/27/2022 7.3  6.0 - 8.5 g/dL Final   • Albumin 09/27/2022 4.40  3.50 - 5.20 g/dL Final   • ALT (SGPT) 09/27/2022 67 (A) 1 - 33 U/L Final   • AST (SGOT) 09/27/2022 77 (A) 1 - 32 U/L Final   • Alkaline Phosphatase 09/27/2022 106  39 - 117 U/L Final   • Total Bilirubin 09/27/2022 0.4  0.0 - 1.2 mg/dL Final   • Globulin 09/27/2022 2.9  gm/dL Final    Calculated Result   • A/G Ratio 09/27/2022 1.5  g/dL Final   • BUN/Creatinine Ratio 09/27/2022 21.3  7.0 - 25.0 Final   • Anion Gap 09/27/2022 12.0  5.0 - 15.0 mmol/L Final   • eGFR 09/27/2022 85.8  >60.0 mL/min/1.73 Final    National Kidney Foundation and American Society of Nephrology (ASN) Task Force recommended calculation based on the Chronic Kidney Disease Epidemiology Collaboration (CKD-EPI) equation refit without adjustment for race.   • Troponin T 09/27/2022 <0.010  0.000 - 0.030 ng/mL Final   • Magnesium 09/27/2022 1.5 (A) 1.6 - 2.4 mg/dL Final   • Color, UA 09/27/2022 Yellow  Yellow, Straw Final   • Appearance, UA 09/27/2022 Clear  Clear Final   • pH, UA 09/27/2022 <=5.0  5.0 - 8.0 Final   • Specific Gravity, UA 09/27/2022 1.023  1.001 - 1.030 Final   • Glucose, UA 09/27/2022 >=1000 mg/dL (3+) (A) Negative Final   • Ketones, UA 09/27/2022 Trace (A) Negative Final   • Bilirubin, UA 09/27/2022 Negative  Negative Final   • Blood, UA 09/27/2022 Negative  Negative Final   • Protein, UA 09/27/2022 Trace (A) Negative Final   • Leuk Esterase, UA 09/27/2022 Negative  Negative Final   • Nitrite, UA 09/27/2022 Negative  Negative Final   • Urobilinogen, UA 09/27/2022 0.2 E.U./dL  0.2 - 1.0 E.U./dL Final   • Extra Tube 09/27/2022 Hold for add-ons.   Final    Auto resulted.   • Extra Tube 09/27/2022 hold for add-on   Final    Auto resulted   • Extra Tube 09/27/2022 Hold for add-ons.   Final    Auto resulted.   • Extra Tube 09/27/2022 Hold for add-ons.   Final    Auto resulted.   • Extra Tube 09/27/2022 Hold for add-ons.   Final    Auto resulted   • WBC  09/27/2022 10.09  3.40 - 10.80 10*3/mm3 Final   • RBC 09/27/2022 4.55  3.77 - 5.28 10*6/mm3 Final   • Hemoglobin 09/27/2022 13.3  12.0 - 15.9 g/dL Final   • Hematocrit 09/27/2022 39.6  34.0 - 46.6 % Final   • MCV 09/27/2022 87.0  79.0 - 97.0 fL Final   • MCH 09/27/2022 29.2  26.6 - 33.0 pg Final   • MCHC 09/27/2022 33.6  31.5 - 35.7 g/dL Final   • RDW 09/27/2022 14.1  12.3 - 15.4 % Final   • RDW-SD 09/27/2022 45.1  37.0 - 54.0 fl Final   • MPV 09/27/2022 10.8  6.0 - 12.0 fL Final   • Platelets 09/27/2022 260  140 - 450 10*3/mm3 Final   • Neutrophil % 09/27/2022 69.8  42.7 - 76.0 % Final   • Lymphocyte % 09/27/2022 22.1  19.6 - 45.3 % Final   • Monocyte % 09/27/2022 6.2  5.0 - 12.0 % Final   • Eosinophil % 09/27/2022 1.1  0.3 - 6.2 % Final   • Basophil % 09/27/2022 0.4  0.0 - 1.5 % Final   • Immature Grans % 09/27/2022 0.4  0.0 - 0.5 % Final   • Neutrophils, Absolute 09/27/2022 7.04 (A) 1.70 - 7.00 10*3/mm3 Final   • Lymphocytes, Absolute 09/27/2022 2.23  0.70 - 3.10 10*3/mm3 Final   • Monocytes, Absolute 09/27/2022 0.63  0.10 - 0.90 10*3/mm3 Final   • Eosinophils, Absolute 09/27/2022 0.11  0.00 - 0.40 10*3/mm3 Final   • Basophils, Absolute 09/27/2022 0.04  0.00 - 0.20 10*3/mm3 Final   • Immature Grans, Absolute 09/27/2022 0.04  0.00 - 0.05 10*3/mm3 Final   • nRBC 09/27/2022 0.0  0.0 - 0.2 /100 WBC Final   • D-Dimer, Quantitative 09/27/2022 0.40  0.01 - 0.50 MCGFEU/mL Final   • QT Interval 09/27/2022 380  ms Final   • QTC Interval 09/27/2022 504  ms Final   • QT Interval 09/27/2022 390  ms Final   • QTC Interval 09/27/2022 515  ms Final                   Assessment and Plan {CC Problem List  Visit Diagnosis  ROS  Review (Popup)  Health Maintenance  Quality  BestPractice  Medications  SmartSets  SnapShot Encounters  Media :23}   1. PAF (paroxysmal atrial fibrillation) (HCC)  Newly dx.  Patient with multiple cardiac risk factors.  Questionable anginal equivalent.  No history of ischemic evaluation.    We  will schedule myocardial perfusion stress test, Lexiscan.  Patient unable to ambulate due to torn meniscus, unsteady gait.    - Adult Transthoracic Echo Complete W/ Cont if Necessary Per Protocol; Future    Continue:  - apixaban (ELIQUIS) 5 MG tablet tablet; Take 1 tablet by mouth Every 12 (Twelve) Hours.  Dispense: 180 tablet; Refill: 2  - TSH; Future    Increase Coreg 25 mg BID    A. fib education completed: What is atrial fibrillation, causes, triggers, signs and symptoms, medication management (rate control versus rhythm control) and stroke prevention, procedural management and indications, and the role of the atrial fibrillation center and when to call.    Encourage compliance with CPAP for CLEMENTE    Patient has upcoming eye surgery scheduled on 10/24/2022.  We will want to have stress and echo completed prior to that date.  Discussed anticoagulation recommendations.  She is to hold Eliquis 48 hours prior to procedure, may start the day after procedure pending hemostasis and recommendation of surgeon    2. Primary hypertension  With increasing carvedilol.  We will decrease valsartan at this time.  Patient will keep a home blood pressure log.    - valsartan (DIOVAN) 320 MG tablet; Take 0.5 tablets by mouth Daily.  Dispense: 90 tablet; Refill: 0  - carvedilol (COREG) 25 MG tablet; Take 1 tablet by mouth 2 (Two) Times a Day.  Dispense: 180 tablet; Refill: 3    3. Hyperlipidemia, unspecified hyperlipidemia type  Statin  - Lipid Panel; Future    4. Obesity (BMI 30.0-34.9)    - Lipid Panel; Future    5. Hypomagnesemia    - Magnesium; Future    C f/u to be scheduled      I spent 45 minutes caring for Renata on this date of service. This time includes time spent by me in the following activities:preparing for the visit, reviewing tests, performing a medically appropriate examination and/or evaluation , counseling and educating the patient/family/caregiver, ordering medications, tests, or procedures, referring and  communicating with other health care professionals , documenting information in the medical record and care coordination    Follow Up {Instructions Charge Capture  Follow-up Communications :23}   Return in about 2 weeks (around 10/14/2022) for Video visit, afib/aflutter/SVT, HTN.    Patient was given instructions and counseling regarding her condition or for health maintenance advice. Please see specific information pulled into the AVS if appropriate.  Patient was instructed to call the [facility] with any questions, concerns, or worsening symptoms.

## 2022-09-30 NOTE — PATIENT INSTRUCTIONS
You can  hold your Elqius 48 hour before your eye surgery and restart the day after.    Increase Coreg 25 mg twice a day    Decrease Valsartan 320 mg 1/2 tab daily    You will be called to schedule your heart ultrasound and your stress test.      You will be called for your cardiology follow up with [facility] at [facility].     Fasting labs in 2 weeks.

## 2022-10-03 ENCOUNTER — SPECIALTY PHARMACY (OUTPATIENT)
Dept: ENDOCRINOLOGY | Facility: CLINIC | Age: 72
End: 2022-10-03

## 2022-10-03 NOTE — PROGRESS NOTES
Specialty Pharmacy Patient Management Program  Endocrinology Refill Outreach      Renata is a 71 y.o. female contacted today regarding refills of her medication(s).    Specialty medication(s) and dose(s) confirmed: N/A  Other medications being refilled: Meloxicam, Valtrex    Refill Questions    Flowsheet Row Most Recent Value   Changes to allergies? No   Changes to medications? No   New conditions since last clinic visit No   Unplanned office visit, urgent care, ED, or hospital admission in the last 4 weeks  No   How does patient/caregiver feel medication is working? Very good   Financial problems or insurance changes  No   Since the previous refill, were any specialty medication doses or scheduled injections missed or delayed?  No   Does this patient require a clinical escalation to a pharmacist? No          Delivery Questions    Flowsheet Row Most Recent Value   Delivery method FedEx   Delivery address correct? Yes   Delivery phone number [phone]   Preferred delivery time? Anytime   Number of medications in delivery 2   Medication being filled and delivered Meloxicam, Valtrex   Doses left of specialty medications na   Is there any medication that is due not being filled? No   Supplies needed? No supplies needed   Cooler needed? No   Do any medications need mixed or dated? No   Copay form of payment Credit card on file   Additional comments $11.57   Questions or concerns for the pharmacist? No   Are any medications first time fills? No          Medication Adherence    Support network for adherence: family member, healthcare provider   Other support network: Pharmacy              Follow-up: 30d    Lucia Cid PharmD  Clinical Specialty Pharmacist, Endocrinology  10/3/2022  12:34 EDT

## 2022-10-12 ENCOUNTER — TELEMEDICINE (OUTPATIENT)
Dept: CARDIOLOGY | Facility: HOSPITAL | Age: 72
End: 2022-10-12

## 2022-10-12 VITALS
HEIGHT: 66 IN | HEART RATE: 84 BPM | WEIGHT: 210 LBS | DIASTOLIC BLOOD PRESSURE: 80 MMHG | BODY MASS INDEX: 33.75 KG/M2 | SYSTOLIC BLOOD PRESSURE: 131 MMHG

## 2022-10-12 DIAGNOSIS — E83.42 HYPOMAGNESEMIA: ICD-10-CM

## 2022-10-12 DIAGNOSIS — I48.0 PAF (PAROXYSMAL ATRIAL FIBRILLATION): Primary | ICD-10-CM

## 2022-10-12 DIAGNOSIS — I10 PRIMARY HYPERTENSION: ICD-10-CM

## 2022-10-12 PROCEDURE — 99214 OFFICE O/P EST MOD 30 MIN: CPT | Performed by: NURSE PRACTITIONER

## 2022-10-12 RX ORDER — VALSARTAN 320 MG/1
320 TABLET ORAL DAILY
Qty: 90 TABLET | Refills: 0
Start: 2022-10-12 | End: 2023-01-27

## 2022-10-12 NOTE — PROGRESS NOTES
[facility], [facility]    This was an audio and video enabled telemedicine encounter.    You have chosen to receive care through the use of telemedicine. Telemedicine enables health care providers at different locations to provide safe, effective, and convenient care through the use of technology. As with any health care service, there are risks associated with the use of telemedicine, including equipment failure, poor connections, and  issues.    • Do you understand the risks and benefits of telemedicine as I have explained them to you? Yes  • Have your questions regarding telemedicine been answered? Yes  • Do you consent to the use of telemedicine in your medical care today? Yes    Chief Complaint  Follow-up (/Afib and HTN)    Subjective    History of Present Illness {CC  Problem List  Visit  Diagnosis   Encounters  Notes  Medications  Labs  Result Review Imaging  Media :23}     Renata Lynn presents to [facility] for   History of Present Illness     71-year-old female with history of hypertension, hyperlipidemia, diabetes type 2, obesity, GERD, CLEMENTE on CPAP.    Recently diagnosed atrial fibrillation, 9/27/2022.    Cardiac risk factors:  History of  dyslipidemia, hypertension, diabetes.  Tobacco use (remote), sedentary lifestyle, obesity (BMI > 30), age (older than 55 for men, 65 for women).    No previous history of ischemic evaluation.  Stress test and echocardiogram have been ordered.    Recently increased Coreg to 25 mg twice a day.  Diovan was decreased to 320 mg 1/2 tablet daily to avoid hypotension with increasing carvedilol.    Patient reports that she has had 3-4 episodes of the last 2 weeks what she feels is atrial fibrillation.  She has had fluctuating heart rates in the 140s.  Longest duration 2 hours.    Blood pressures have been 130s to 150s, typically in the 150s.  Heart rate when not in atrial  "fibrillation appears to be in the 80s.    She continues with feelings of chest discomfort associated with palpitations.  2 episodes occurred while sleeping.  She was able to go back to sleep.  She is compliant with her CPAP.  1-2 episodes occurred during the day after stressful busy day.        Objective     Vital Signs:   Vitals:    10/12/22 0826   BP: 131/80   Pulse: 84   Weight: 95.3 kg (210 lb)   Height: 167.6 cm (66\")     Body mass index is 33.89 kg/m².  Physical Exam  Vitals reviewed.   Constitutional:       General: She is not in acute distress.  Pulmonary:      Effort: Pulmonary effort is normal.   Skin:     Coloration: Skin is not pale.   Neurological:      Mental Status: She is alert.   Psychiatric:         Mood and Affect: Mood normal.         Behavior: Behavior normal. Behavior is cooperative.              Result Review  Data Reviewed:{ Labs  Result Review  Imaging  Med Tab  Media :23}                 Assessment and Plan {CC Problem List  Visit Diagnosis  ROS  Review (Popup)  Health Maintenance  Quality  BestPractice  Medications  SmartSets  SnapShot Encounters  Media :23}   1. PAF (paroxysmal atrial fibrillation) (HCC)  Patient has noted 3-4 episodes lasting approximately 2 hours in duration.    Continue beta-blocker, Eliquis for stroke prevention.    Continue CPAP compliance    Labs to be completed as ordered    Patient will complete stress and echo as ordered    She will continue log of A. fib recurrence  .  If she continues to have episodes of rapid heart rate  And palpitations may be considered for flecainide or sotalol pending ischemic evaluation    2. Primary hypertension  Patient will increase Diovan back to 1 tablet daily continue carvedilol.  Continue log    3. Hypomagnesemia  Labs to be completed as ordered.    Patient keep follow-up with Dr. Jacobson as scheduled.  Follow-up in the heart valve center as needed or as determined by cardiology.    I spent 22 minutes caring for " Renata on this date of service. This time includes time spent by me in the following activities:preparing for the visit, reviewing tests, performing a medically appropriate examination and/or evaluation , counseling and educating the patient/family/caregiver and documenting information in the medical record    Follow Up {Instructions Charge Capture  Follow-up Communications :23}   No follow-ups on file.    Patient was given instructions and counseling regarding her condition or for health maintenance advice. Please see specific information pulled into the AVS if appropriate.  Patient was instructed to call the [facility] with any questions, concerns, or worsening symptoms.

## 2022-10-24 ENCOUNTER — SPECIALTY PHARMACY (OUTPATIENT)
Dept: ENDOCRINOLOGY | Facility: CLINIC | Age: 72
End: 2022-10-24

## 2022-10-24 NOTE — PROGRESS NOTES
Specialty Pharmacy Patient Management Program  Endocrinology Refill Outreach      Renata is a 71 y.o. female contacted today regarding refills of her medication(s).    Specialty medication(s) and dose(s) confirmed: Vascepa, Ozempic  Other medications being refilled: Eliquis, HCTZ, Coreg, Lipitor    Refill Questions    Flowsheet Row Most Recent Value   Changes to allergies? No   Changes to medications? No   New conditions since last clinic visit No   Unplanned office visit, urgent care, ED, or hospital admission in the last 4 weeks  No   How does patient/caregiver feel medication is working? Very good   Financial problems or insurance changes  No   Since the previous refill, were any specialty medication doses or scheduled injections missed or delayed?  No   Does this patient require a clinical escalation to a pharmacist? No          Delivery Questions    Flowsheet Row Most Recent Value   Delivery method  at Pharmacy   Delivery phone number [phone]   Number of medications in delivery 6   Medication being filled and delivered Vascepa, HCTZ, Coreg, Lipitor, Ozempic, Eliquis   Copay form of payment Pay at pickup   Additional comments Copay $212.01   Questions or concerns for the pharmacist? No   Are any medications first time fills? No   Shipment status Delivery complete          Medication Adherence    Support network for adherence: family member, healthcare provider   Other support network: Pharmacy              Follow-up: 84 Days      Clarisse Plascencia, Care Coordinator   Endocrinology  10/24/2022  12:03 EDT

## 2022-10-25 ENCOUNTER — HOSPITAL ENCOUNTER (OUTPATIENT)
Dept: CARDIOLOGY | Facility: HOSPITAL | Age: 72
Discharge: HOME | End: 2022-10-25

## 2022-10-25 VITALS
HEIGHT: 66 IN | DIASTOLIC BLOOD PRESSURE: 70 MMHG | BODY MASS INDEX: 33.77 KG/M2 | WEIGHT: 210.1 LBS | SYSTOLIC BLOOD PRESSURE: 140 MMHG | HEART RATE: 90 BPM

## 2022-10-25 VITALS — HEIGHT: 66 IN | WEIGHT: 210.1 LBS | BODY MASS INDEX: 33.77 KG/M2

## 2022-10-25 DIAGNOSIS — I48.0 PAF (PAROXYSMAL ATRIAL FIBRILLATION): ICD-10-CM

## 2022-10-25 DIAGNOSIS — I20.8 ANGINAL EQUIVALENT: ICD-10-CM

## 2022-10-25 PROCEDURE — 25010000002 REGADENOSON 0.4 MG/5ML SOLUTION: Performed by: NURSE PRACTITIONER

## 2022-10-25 PROCEDURE — 93306 TTE W/DOPPLER COMPLETE: CPT

## 2022-10-25 PROCEDURE — 0 TECHNETIUM SESTAMIBI: Performed by: NURSE PRACTITIONER

## 2022-10-25 PROCEDURE — 93017 CV STRESS TEST TRACING ONLY: CPT

## 2022-10-25 PROCEDURE — 78452 HT MUSCLE IMAGE SPECT MULT: CPT | Performed by: INTERNAL MEDICINE

## 2022-10-25 PROCEDURE — 93306 TTE W/DOPPLER COMPLETE: CPT | Performed by: INTERNAL MEDICINE

## 2022-10-25 PROCEDURE — 93018 CV STRESS TEST I&R ONLY: CPT | Performed by: INTERNAL MEDICINE

## 2022-10-25 PROCEDURE — A9500 TC99M SESTAMIBI: HCPCS | Performed by: NURSE PRACTITIONER

## 2022-10-25 PROCEDURE — 78452 HT MUSCLE IMAGE SPECT MULT: CPT

## 2022-10-25 RX ADMIN — REGADENOSON 0.4 MG: 0.08 INJECTION, SOLUTION INTRAVENOUS at 14:27

## 2022-10-25 RX ADMIN — TECHNETIUM TC 99M SESTAMIBI 1 DOSE: 1 INJECTION INTRAVENOUS at 14:30

## 2022-10-25 RX ADMIN — TECHNETIUM TC 99M SESTAMIBI 1 DOSE: 1 INJECTION INTRAVENOUS at 12:50

## 2022-10-26 LAB
ASCENDING AORTA: 3.3 CM
BH CV ECHO MEAS - AO MAX PG: 11.7 MMHG
BH CV ECHO MEAS - AO MEAN PG: 6.6 MMHG
BH CV ECHO MEAS - AO ROOT DIAM: 2.7 CM
BH CV ECHO MEAS - AO V2 MAX: 171 CM/SEC
BH CV ECHO MEAS - AO V2 VTI: 40.8 CM
BH CV ECHO MEAS - AVA(I,D): 2.2 CM2
BH CV ECHO MEAS - EDV(CUBED): 65.7 ML
BH CV ECHO MEAS - EDV(MOD-SP2): 103 ML
BH CV ECHO MEAS - EDV(MOD-SP4): 82.5 ML
BH CV ECHO MEAS - EF(MOD-BP): 63.4 %
BH CV ECHO MEAS - EF(MOD-SP2): 69.3 %
BH CV ECHO MEAS - EF(MOD-SP4): 58.1 %
BH CV ECHO MEAS - ESV(CUBED): 16.5 ML
BH CV ECHO MEAS - ESV(MOD-SP2): 31.6 ML
BH CV ECHO MEAS - ESV(MOD-SP4): 34.6 ML
BH CV ECHO MEAS - FS: 37 %
BH CV ECHO MEAS - IVS/LVPW: 0.97 CM
BH CV ECHO MEAS - IVSD: 1.22 CM
BH CV ECHO MEAS - LA DIMENSION: 3.1 CM
BH CV ECHO MEAS - LAT PEAK E' VEL: 8 CM/SEC
BH CV ECHO MEAS - LV DIASTOLIC VOL/BSA (35-75): 40.4 CM2
BH CV ECHO MEAS - LV MASS(C)D: 175.9 GRAMS
BH CV ECHO MEAS - LV MAX PG: 5.7 MMHG
BH CV ECHO MEAS - LV MEAN PG: 3.7 MMHG
BH CV ECHO MEAS - LV SYSTOLIC VOL/BSA (12-30): 16.9 CM2
BH CV ECHO MEAS - LV V1 MAX: 119.4 CM/SEC
BH CV ECHO MEAS - LV V1 VTI: 28 CM
BH CV ECHO MEAS - LVIDD: 4 CM
BH CV ECHO MEAS - LVIDS: 2.5 CM
BH CV ECHO MEAS - LVOT AREA: 3.2 CM2
BH CV ECHO MEAS - LVOT DIAM: 2.02 CM
BH CV ECHO MEAS - LVPWD: 1.26 CM
BH CV ECHO MEAS - MED PEAK E' VEL: 7.2 CM/SEC
BH CV ECHO MEAS - MV A MAX VEL: 162.4 CM/SEC
BH CV ECHO MEAS - MV DEC SLOPE: 560.2 CM/SEC2
BH CV ECHO MEAS - MV DEC TIME: 0.31 MSEC
BH CV ECHO MEAS - MV E MAX VEL: 118 CM/SEC
BH CV ECHO MEAS - MV E/A: 0.73
BH CV ECHO MEAS - MV MAX PG: 11.6 MMHG
BH CV ECHO MEAS - MV MEAN PG: 4.8 MMHG
BH CV ECHO MEAS - MV P1/2T: 68.8 MSEC
BH CV ECHO MEAS - MV V2 VTI: 45.5 CM
BH CV ECHO MEAS - MVA(P1/2T): 3.2 CM2
BH CV ECHO MEAS - MVA(VTI): 1.97 CM2
BH CV ECHO MEAS - PA ACC TIME: 0.09 SEC
BH CV ECHO MEAS - PA PR(ACCEL): 37.4 MMHG
BH CV ECHO MEAS - PA V2 MAX: 123.5 CM/SEC
BH CV ECHO MEAS - RAP SYSTOLE: 3 MMHG
BH CV ECHO MEAS - RVSP: 21 MMHG
BH CV ECHO MEAS - SI(MOD-SP2): 35 ML/M2
BH CV ECHO MEAS - SI(MOD-SP4): 23.5 ML/M2
BH CV ECHO MEAS - SV(LVOT): 89.7 ML
BH CV ECHO MEAS - SV(MOD-SP2): 71.4 ML
BH CV ECHO MEAS - SV(MOD-SP4): 47.9 ML
BH CV ECHO MEAS - TAPSE (>1.6): 2.28 CM
BH CV ECHO MEAS - TR MAX PG: 18.1 MMHG
BH CV ECHO MEAS - TR MAX VEL: 212.7 CM/SEC
BH CV ECHO MEASUREMENTS AVERAGE E/E' RATIO: 15.53
BH CV REST NUCLEAR ISOTOPE DOSE: 9.9 MCI
BH CV STRESS BP STAGE 2: NORMAL
BH CV STRESS BP STAGE 4: NORMAL
BH CV STRESS COMMENTS STAGE 1: NORMAL
BH CV STRESS DOSE REGADENOSON STAGE 1: 0.4
BH CV STRESS DURATION MIN STAGE 1: 1
BH CV STRESS DURATION MIN STAGE 2: 1
BH CV STRESS DURATION MIN STAGE 3: 1
BH CV STRESS DURATION MIN STAGE 4: 1
BH CV STRESS DURATION SEC STAGE 1: 0
BH CV STRESS DURATION SEC STAGE 2: 0
BH CV STRESS DURATION SEC STAGE 3: 0
BH CV STRESS DURATION SEC STAGE 4: 0
BH CV STRESS HR STAGE 1: 95
BH CV STRESS HR STAGE 2: 109
BH CV STRESS HR STAGE 3: 99
BH CV STRESS HR STAGE 4: 94
BH CV STRESS NUCLEAR ISOTOPE DOSE: 31.5 MCI
BH CV STRESS O2 STAGE 1: 96
BH CV STRESS O2 STAGE 2: 98
BH CV STRESS O2 STAGE 3: 99
BH CV STRESS O2 STAGE 4: 98
BH CV STRESS PROTOCOL 1: NORMAL
BH CV STRESS RECOVERY BP: NORMAL MMHG
BH CV STRESS RECOVERY HR: 88 BPM
BH CV STRESS RECOVERY O2: 98 %
BH CV STRESS STAGE 1: 1
BH CV STRESS STAGE 2: 2
BH CV STRESS STAGE 3: 3
BH CV STRESS STAGE 4: 4
BH CV VAS BP LEFT ARM: NORMAL MMHG
BH CV XLRA - RV BASE: 3.8 CM
BH CV XLRA - RV LENGTH: 6.3 CM
BH CV XLRA - RV MID: 2.9 CM
BH CV XLRA - TDI S': 12.4 CM/SEC
LEFT ATRIUM VOLUME INDEX: 23 ML/M2
LV EF NUC BP: 83 %
MAXIMAL PREDICTED HEART RATE: 150 BPM
MAXIMAL PREDICTED HEART RATE: 150 BPM
PERCENT MAX PREDICTED HR: 72.67 %
STRESS BASELINE BP: NORMAL MMHG
STRESS BASELINE HR: 81 BPM
STRESS O2 SAT REST: 96 %
STRESS PERCENT HR: 85 %
STRESS POST ESTIMATED WORKLOAD: 1 METS
STRESS POST EXERCISE DUR MIN: 4 MIN
STRESS POST EXERCISE DUR SEC: 0 SEC
STRESS POST O2 SAT PEAK: 99 %
STRESS POST PEAK BP: NORMAL MMHG
STRESS POST PEAK HR: 109 BPM
STRESS TARGET HR: 128 BPM
STRESS TARGET HR: 128 BPM

## 2022-10-27 NOTE — PROGRESS NOTES
Your cardiac stress test results have been reviewed.  We were able to see mild to moderate calcifications on your imaging part of the test.  These calcifications did not affect blood flow to the heart muscle.  Overall your stress test was considered acceptable.  No concerning findings.  We asked that you continue your aspirin and your cholesterol medication.  Your results and continued management will be discussed with your cardiologist at your follow-up visit.  Please call with any questions or concerns.

## 2022-10-27 NOTE — PROGRESS NOTES
Your echocardiogram results have been reviewed.  Your heart muscle function is normal.  We have seen some mild changes in your heart structure.  There is mild thickness in the muscle of the left bottom part of the heart called the left ventricle.  There is also mild impairment when the heart relaxes.  Again this is a mild change.  At times it can be an age-related finding.  We will focus on good blood pressure control.  We will encourage good compliance and use of CPAP for treatment of obstructive sleep apnea.  Overall your results are considered acceptable.  Your results will be discussed further at your follow-up visit with your cardiologist.  Please call if you have any questions or concerns.

## 2022-11-03 ENCOUNTER — SPECIALTY PHARMACY (OUTPATIENT)
Dept: ENDOCRINOLOGY | Facility: CLINIC | Age: 72
End: 2022-11-03

## 2022-11-03 NOTE — PROGRESS NOTES
Specialty Pharmacy Patient Management Program  Endocrinology Refill Outreach      Renata is a 71 y.o. female contacted today regarding refills of her medication(s).    Specialty medication(s) and dose(s) confirmed: na  Other medications being refilled: Test Strips/Lancets, Mobic, Valtrex     Refill Questions    Flowsheet Row Most Recent Value   Changes to allergies? No   Changes to medications? No   New conditions since last clinic visit No   Unplanned office visit, urgent care, ED, or hospital admission in the last 4 weeks  No   How does patient/caregiver feel medication is working? Very good   Financial problems or insurance changes  No   Since the previous refill, were any specialty medication doses or scheduled injections missed or delayed?  No   Does this patient require a clinical escalation to a pharmacist? No          Delivery Questions    Flowsheet Row Most Recent Value   Delivery method FedEx   Delivery address correct? No   Delivery phone number [phone]   Preferred delivery time? Anytime   Number of medications in delivery 3   Medication being filled and delivered Test Strips, Mobic, Valtrex   Is there any medication that is due not being filled? No   Supplies needed? No supplies needed   Cooler needed? No   Do any medications need mixed or dated? No   Copay form of payment Credit card on file   Additional comments Copay $5.52   Questions or concerns for the pharmacist? No   Are any medications first time fills? No   Shipment status Delivery complete          Medication Adherence    Support network for adherence: family member, healthcare provider   Other support network: Pharmacy              Follow-up: 30 Days      Clarisse Plascencia, Care Coordinator   Endocrinology  11/3/2022  10:27 EDT

## 2022-11-04 ENCOUNTER — APPOINTMENT (OUTPATIENT)
Dept: CARDIOLOGY | Facility: HOSPITAL | Age: 72
End: 2022-11-04

## 2022-11-16 ENCOUNTER — OFFICE VISIT (OUTPATIENT)
Dept: CARDIOLOGY | Facility: CLINIC | Age: 72
End: 2022-11-16

## 2022-11-16 VITALS
HEART RATE: 78 BPM | HEIGHT: 67 IN | BODY MASS INDEX: 32.96 KG/M2 | SYSTOLIC BLOOD PRESSURE: 150 MMHG | OXYGEN SATURATION: 97 % | WEIGHT: 210 LBS | DIASTOLIC BLOOD PRESSURE: 68 MMHG

## 2022-11-16 DIAGNOSIS — I10 BENIGN HYPERTENSION: ICD-10-CM

## 2022-11-16 DIAGNOSIS — I48.0 PAROXYSMAL ATRIAL FIBRILLATION: Primary | ICD-10-CM

## 2022-11-16 DIAGNOSIS — E78.2 MIXED HYPERLIPIDEMIA: ICD-10-CM

## 2022-11-16 PROCEDURE — 93000 ELECTROCARDIOGRAM COMPLETE: CPT | Performed by: INTERNAL MEDICINE

## 2022-11-16 PROCEDURE — 99214 OFFICE O/P EST MOD 30 MIN: CPT | Performed by: INTERNAL MEDICINE

## 2022-11-16 RX ORDER — DIPHENOXYLATE HYDROCHLORIDE AND ATROPINE SULFATE 2.5; .025 MG/1; MG/1
TABLET ORAL DAILY
COMMUNITY
End: 2023-01-26

## 2022-11-16 RX ORDER — MULTIPLE VITAMINS W/ MINERALS TAB 9MG-400MCG
1 TAB ORAL DAILY
COMMUNITY

## 2022-11-16 RX ORDER — SODIUM PHOSPHATE,MONO-DIBASIC 19G-7G/118
ENEMA (ML) RECTAL 2 TIMES DAILY
COMMUNITY

## 2022-11-16 RX ORDER — PHENOL 1.4 %
1200 AEROSOL, SPRAY (ML) MUCOUS MEMBRANE DAILY
COMMUNITY

## 2022-11-16 NOTE — PROGRESS NOTES
[facility] Cardiology  Consultation H&P  Renata Lynn  1950  [phone]  There is no work phone number on file..    VISIT DATE:  11/16/2022    PCP: Anuj Sarah DO  [address]   [facility] [place] [address]    CC:  Chief Complaint   Patient presents with   • Irregular Heart Beat       Previous cardiac studies and procedures:  October 2022  Myocardial perfusion imaging  •  Mild to moderate calcifications visualized in the mid LAD.  Mild to moderate scattered calcifications visualized in the descending aorta.  •  Left ventricular ejection fraction is hyperdynamic (Calculated EF > 70%).  •  Myocardial perfusion imaging indicates a normal myocardial perfusion study with no evidence of ischemia.  TTE  •  Left ventricular systolic function is normal. Calculated left ventricular EF = 63.4% Left ventricular ejection fraction appears to be 61 - 65%.  •  Left ventricular wall thickness is consistent with mild concentric hypertrophy.  •  Left ventricular diastolic function is consistent with (grade Ia w/high LAP) impaired relaxation.  •  Estimated right ventricular systolic pressure from tricuspid regurgitation is normal (<35 mmHg). Calculated right ventricular systolic pressure from tricuspid regurgitation is 21 mmHg.      ASSESSMENT:   Diagnosis Plan   1. Paroxysmal atrial fibrillation (HCC)        2. Benign hypertension        3. Mixed hyperlipidemia              PLAN:  Paroxysmal atrial fibrillation: [id number] equal to 4.  Continue Eliquis 5 mg p.o. twice daily for stroke prophylaxis.  Continue rate control with carvedilol 25 mg p.o. twice daily.  If episodes of atrial fibrillation increase in frequency, severity or duration we will proceed with antiarrhythmic therapy, likely flecainide.  Continue CPAP therapy.    Hypertension: Goal less than 130/80 mmHg.  Currently reasonable control.  We will continue to keep a blood pressure log.  Associated asymptomatic  "diastolic dysfunction and mild concentric left ventricular hypertrophy.  Continue current medical therapy.    Hyperlipidemia: Goal LDL less than 70.  Continue current medical therapy.      History of Present Illness   71-year-old diabetic female with history of hypertension, dyslipidemia and obstructive sleep apnea on CPAP therapy recently presented with symptomatic paroxysmal atrial. fib with RVR.  She is undergone cardiac evaluation with transthoracic echo and Uma scan myocardial perfusion imaging.  Carvedilol was titrated to 25 mg p.o. twice daily and Eliquis was added to her regimen.  She had initial 2 or 3 brief episodes in the 2 to 3 weeks after her initial diagnosis at the end of September which were milder and nature following titration of beta-blockade.  Has been fairly asymptomatic over the previous 4 to 6 weeks.  Was noted to have hypomagnesemia on ED evaluation, repeat 1.9.  Has not been keeping track of home blood pressures recently but were previously running less than 135/85 mmHg.  She is compliant with medical therapy.    PHYSICAL EXAMINATION:  Vitals:    11/16/22 0812   BP: 150/68   BP Location: Left arm   Patient Position: Sitting   Pulse: 78   SpO2: 97%   Weight: 95.3 kg (210 lb)   Height: 168.9 cm (66.5\")     General Appearance:    Alert, cooperative, no distress, appears stated age   Head:    Normocephalic, without obvious abnormality, atraumatic   Eyes:    conjunctiva/corneas clear, EOM's intact, fundi     benign, both eyes   Ears:    Normal TM's and external ear canals, both ears   Nose:   Nares normal, septum midline, mucosa normal, no drainage    or sinus tenderness   Throat:   Lips, mucosa, and tongue normal; teeth and gums normal   Neck:   Supple, symmetrical, trachea midline, no adenopathy;     thyroid:  no enlargement/tenderness/nodules; no carotid    bruit or JVD   Back:     Symmetric, no curvature, ROM normal, no CVA tenderness   Lungs:     Clear to auscultation bilaterally, " respirations unlabored   Chest Wall:    No tenderness or deformity    Heart:    Regular rate and rhythm, S1 and S2 normal, 2/6 early peaking systolic murmur right upper sternal border, no rub   or gallop, normal carotid impulse bilaterally without bruit.   Abdomen:     Soft, non-tender, bowel sounds active all four quadrants,     no masses, no organomegaly   Extremities:   Extremities normal, atraumatic, no cyanosis or edema   Pulses:   2+ and symmetric all extremities   Skin:   Skin color, texture, turgor normal, no rashes or lesions   Lymph nodes:   Cervical, supraclavicular, and axillary nodes normal   Neurologic:   normal strength, sensation intact     throughout       Diagnostic Data:    ECG 12 Lead    Date/Time: 11/16/2022 8:54 AM  Performed by: Babak Jacobson III, MD  Authorized by: Babak Jacobson III, MD   Comparison: compared with previous ECG from 9/27/2022  Similar to previous ECG  Rhythm: sinus rhythm  Conduction: right bundle branch block  Other findings: poor R wave progression    Clinical impression: abnormal EKG          Lab Results   Component Value Date    CHLPL 170 08/04/2021    TRIG 471 (H) 08/04/2021    HDL 34 (L) 08/04/2021     Lab Results   Component Value Date    GLUCOSE 375 (H) 09/27/2022    BUN 16 09/27/2022    CREATININE 0.75 09/27/2022     (L) 09/27/2022    K 4.6 09/27/2022    CL 98 09/27/2022    CO2 25.0 09/27/2022     Lab Results   Component Value Date    HGBA1C 7.1 12/17/2021     Lab Results   Component Value Date    WBC 10.09 09/27/2022    HGB 13.3 09/27/2022    HCT 39.6 09/27/2022     09/27/2022       PROBLEM LIST:  Patient Active Problem List   Diagnosis   • Uncontrolled type 2 diabetes mellitus with hyperglycemia (HCC)   • Benign hypertension   • Mixed hyperlipidemia       PAST MEDICAL HX  Past Medical History:   Diagnosis Date   • Asthma ?  2022    Listed on hospital release form   • Atrial fibrillation (HCC) Oct  27, 2023   • Basal cell carcinoma    • Hypertensive  disorder    • Mixed hyperlipidemia    • Obesity     body mass index 30+-obesity   • Sleep apnea 2000 ?   • Type 2 diabetes mellitus, uncontrolled        Allergies  Allergies   Allergen Reactions   • Penicillins Swelling       Current Medications    Current Outpatient Medications:   •  Accu-Chek FastClix Lancets misc, Test blood glucose daily and as needed., Disp: 300 each, Rfl: 3  •  apixaban (ELIQUIS) 5 MG tablet tablet, Take 1 tablet by mouth Every 12 (Twelve) Hours., Disp: 180 tablet, Rfl: 2  •  atorvastatin (LIPITOR) 10 MG tablet, Take 1 tablet by mouth every night at bedtime., Disp: 90 tablet, Rfl: 0  •  B Complex Vitamins (VITAMIN B COMPLEX PO), Take  by mouth Daily., Disp: , Rfl:   •  calcium carbonate (OS-SHANICE) 600 MG tablet, Take 1,200 mg by mouth Daily., Disp: , Rfl:   •  carvedilol (COREG) 25 MG tablet, Take 1 tablet by mouth 2 (Two) Times a Day., Disp: 180 tablet, Rfl: 3  •  esomeprazole (nexIUM) 20 MG capsule, Take 20 mg by mouth Every Morning Before Breakfast., Disp: , Rfl:   •  glucosamine-chondroitin 500-400 MG capsule capsule, Take  by mouth 2 (Two) Times a Day., Disp: , Rfl:   •  glucose blood (Accu-Chek Guide) test strip, Test blood glucose daily and as needed., Disp: 300 each, Rfl: 3  •  hydroCHLOROthiazide (HYDRODIURIL) 25 MG tablet, Take 1 tablet by mouth Daily., Disp: 90 tablet, Rfl: 0  •  ipratropium (ATROVENT) 0.06 % nasal spray, Use 1 spray in the nostril(s) three times daily, Disp: 15 mL, Rfl: 3  •  Lacrisert (LACRISERT) 5 MG insert, Insert under each eyelid daily, Disp: 180 each, Rfl: 3  •  metFORMIN (GLUCOPHAGE) 1000 MG tablet, Take 1 tablet by mouth 2 (Two) Times a Day With Meals., Disp: 180 tablet, Rfl: 3  •  montelukast (SINGULAIR) 10 MG tablet, Take 1 tablet by mouth Daily., Disp: 90 tablet, Rfl: 0  •  multivitamin (MULTI VITAMIN DAILY PO), Take  by mouth Daily., Disp: , Rfl:   •  multivitamin with minerals (EYE-VITES PO), Take 1 tablet by mouth Daily., Disp: , Rfl:   •  ofloxacin  (OCUFLOX) 0.3 % ophthalmic solution, Apply 5 drops to left ear twice daily, Disp: 10 mL, Rfl: 1  •  Semaglutide,0.25 or 0.5MG/DOS, (Ozempic, 0.25 or 0.5 MG/DOSE,) 2 MG/1.5ML solution pen-injector, Inject 0.5 mg under the skin into the appropriate area as directed 1 (One) Time Per Week., Disp: 4.5 mL, Rfl: 3  •  valACYclovir (VALTREX) 1000 MG tablet, Take 1 tablet by mouth Daily for 3 days  As Needed., Disp: 30 tablet, Rfl: 2  •  valsartan (DIOVAN) 320 MG tablet, Take 1 tablet by mouth Daily., Disp: 90 tablet, Rfl: 0  •  Vascepa 1 g capsule capsule, Take 2 capsules (2 grams) by mouth 2 (Two) Times a Day., Disp: 360 capsule, Rfl: 3  •  vitamin D3 125 MCG (5000 UT) capsule capsule, Take 5,000 Units by mouth Daily., Disp: , Rfl:   •  vitamin E 100 UNIT capsule, Take 200 Units by mouth Daily., Disp: , Rfl:   •  Zinc Sulfate (ZINC-220 PO), Take  by mouth Daily., Disp: , Rfl:          ROS  ROS      SOCIAL HX  Social History     Socioeconomic History   • Marital status:    Tobacco Use   • Smoking status: Former     Packs/day: 1.00     Years: 20.00     Pack years: 20.00     Types: Cigarettes   • Smokeless tobacco: Never   • Tobacco comments:     Started late teens stopped late 30s   Vaping Use   • Vaping Use: Never used   Substance and Sexual Activity   • Alcohol use: Yes     Comment: occasional   • Drug use: Never   • Sexual activity: Yes     Partners: Male     Birth control/protection: Condom, Spermicide, Injection, Birth control pill       FAMILY HX  Family History   Problem Relation Age of Onset   • Breast cancer Mother    • Cancer Mother    • Heart disease Father    • Rheumatic fever Father    • Heart attack Sister    • Diabetes Sister    • Thyroid disease Sister    • Skin cancer Sister    • No Known Problems Brother    • Breast cancer Maternal Grandmother 89   • Diabetes Maternal Grandfather    • Alzheimer's disease Paternal Grandmother    • No Known Problems Paternal Grandfather    • Heart attack Sister               Babak Jacobson III, MD, FACC

## 2022-12-13 ENCOUNTER — CLINICAL SUPPORT (OUTPATIENT)
Dept: ORTHOPEDIC SURGERY | Facility: CLINIC | Age: 72
End: 2022-12-13

## 2022-12-13 DIAGNOSIS — M17.12 PRIMARY OSTEOARTHRITIS OF LEFT KNEE: ICD-10-CM

## 2022-12-13 PROCEDURE — 20610 DRAIN/INJ JOINT/BURSA W/O US: CPT | Performed by: ORTHOPAEDIC SURGERY

## 2022-12-13 RX ORDER — LIDOCAINE HYDROCHLORIDE 10 MG/ML
3 INJECTION, SOLUTION EPIDURAL; INFILTRATION; INTRACAUDAL; PERINEURAL
Status: COMPLETED | OUTPATIENT
Start: 2022-12-13 | End: 2022-12-13

## 2022-12-13 RX ADMIN — LIDOCAINE HYDROCHLORIDE 3 ML: 10 INJECTION, SOLUTION EPIDURAL; INFILTRATION; INTRACAUDAL; PERINEURAL at 09:28

## 2022-12-13 NOTE — PROGRESS NOTES
Procedure   Large Joint Arthrocentesis: L knee  Date/Time: 12/13/2022 9:28 AM  Consent given by: patient  Site marked: site marked  Timeout: Immediately prior to procedure a time out was called to verify the correct patient, procedure, equipment, support staff and site/side marked as required   Supporting Documentation  Indications: pain   Procedure Details  Location: knee - L knee  Preparation: Patient was prepped and draped in the usual sterile fashion  Needle size: 23 G  Approach: superior  Medications administered: 30 mg Hyaluronan 30 MG/2ML; 3 mL lidocaine PF 1% 1 %  Aspirate: clear (to verify intraarticular placement of the needle)  Patient tolerance: patient tolerated the procedure well with no immediate complications

## 2022-12-20 ENCOUNTER — SPECIALTY PHARMACY (OUTPATIENT)
Dept: ENDOCRINOLOGY | Facility: CLINIC | Age: 72
End: 2022-12-20

## 2022-12-20 ENCOUNTER — CLINICAL SUPPORT (OUTPATIENT)
Dept: ORTHOPEDIC SURGERY | Facility: CLINIC | Age: 72
End: 2022-12-20

## 2022-12-20 DIAGNOSIS — M17.12 PRIMARY OSTEOARTHRITIS OF LEFT KNEE: Primary | ICD-10-CM

## 2022-12-20 PROCEDURE — 20610 DRAIN/INJ JOINT/BURSA W/O US: CPT | Performed by: ORTHOPAEDIC SURGERY

## 2022-12-20 RX ORDER — LIDOCAINE HYDROCHLORIDE 10 MG/ML
3 INJECTION, SOLUTION EPIDURAL; INFILTRATION; INTRACAUDAL; PERINEURAL
Status: COMPLETED | OUTPATIENT
Start: 2022-12-20 | End: 2022-12-20

## 2022-12-20 RX ADMIN — LIDOCAINE HYDROCHLORIDE 3 ML: 10 INJECTION, SOLUTION EPIDURAL; INFILTRATION; INTRACAUDAL; PERINEURAL at 10:38

## 2022-12-20 NOTE — PROGRESS NOTES
Specialty Pharmacy Patient Management Program  Endocrinology Refill Outreach      Renata is a 72 y.o. female contacted today regarding refills of her medication(s).    Specialty medication(s) and dose(s) confirmed: na  Other medications being refilled: Metformin     Refill Questions    Flowsheet Row Most Recent Value   Changes to allergies? No   Changes to medications? No   New conditions since last clinic visit No   Unplanned office visit, urgent care, ED, or hospital admission in the last 4 weeks  No   How does patient/caregiver feel medication is working? Very good   Financial problems or insurance changes  No   Since the previous refill, were any specialty medication doses or scheduled injections missed or delayed?  No   Does this patient require a clinical escalation to a pharmacist? No          Delivery Questions    Flowsheet Row Most Recent Value   Delivery method [employer]   Delivery address correct? Yes   Delivery phone number    Preferred delivery time? Anytime   Number of medications in delivery 1   Medication being filled and delivered Metformin   Is there any medication that is due not being filled? No   Supplies needed? No supplies needed   Cooler needed? No   Do any medications need mixed or dated? No   Copay form of payment Credit card on file   Additional comments Copay $3.95   Questions or concerns for the pharmacist? No   Are any medications first time fills? No   Shipment status Delivery complete          Medication Adherence    Support network for adherence: family member, healthcare provider   Other support network: Pharmacy              Follow-up: 90 Days      Clarisse Plascencia Care Coordinator   Endocrinology  12/20/2022  11:10 EST

## 2022-12-20 NOTE — PROGRESS NOTES
Procedure   Large Joint Arthrocentesis: L knee  Date/Time: 12/20/2022 10:38 AM  Consent given by: patient  Site marked: site marked  Timeout: Immediately prior to procedure a time out was called to verify the correct patient, procedure, equipment, support staff and site/side marked as required   Supporting Documentation  Indications: pain   Procedure Details  Location: knee - L knee  Preparation: Patient was prepped and draped in the usual sterile fashion  Needle size: 23 G  Approach: anterolateral  Medications administered: 30 mg Hyaluronan 30 MG/2ML; 3 mL lidocaine PF 1% 1 %  Patient tolerance: patient tolerated the procedure well with no immediate complications

## 2022-12-30 ENCOUNTER — SPECIALTY PHARMACY (OUTPATIENT)
Dept: ENDOCRINOLOGY | Facility: CLINIC | Age: 72
End: 2022-12-30

## 2022-12-30 ENCOUNTER — OFFICE VISIT (OUTPATIENT)
Dept: ENDOCRINOLOGY | Facility: CLINIC | Age: 72
End: 2022-12-30

## 2022-12-30 VITALS
SYSTOLIC BLOOD PRESSURE: 128 MMHG | WEIGHT: 203 LBS | HEIGHT: 66 IN | HEART RATE: 75 BPM | OXYGEN SATURATION: 95 % | DIASTOLIC BLOOD PRESSURE: 73 MMHG | BODY MASS INDEX: 32.62 KG/M2

## 2022-12-30 DIAGNOSIS — E11.65 UNCONTROLLED TYPE 2 DIABETES MELLITUS WITH HYPERGLYCEMIA: Primary | ICD-10-CM

## 2022-12-30 DIAGNOSIS — E78.2 MIXED HYPERLIPIDEMIA: ICD-10-CM

## 2022-12-30 DIAGNOSIS — I10 BENIGN HYPERTENSION: ICD-10-CM

## 2022-12-30 LAB
EXPIRATION DATE: ABNORMAL
EXPIRATION DATE: NORMAL
GLUCOSE BLDC GLUCOMTR-MCNC: 198 MG/DL (ref 70–130)
HBA1C MFR BLD: 7.8 %
Lab: ABNORMAL
Lab: NORMAL

## 2022-12-30 PROCEDURE — 99214 OFFICE O/P EST MOD 30 MIN: CPT | Performed by: INTERNAL MEDICINE

## 2022-12-30 PROCEDURE — 3051F HG A1C>EQUAL 7.0%<8.0%: CPT | Performed by: INTERNAL MEDICINE

## 2022-12-30 PROCEDURE — 82947 ASSAY GLUCOSE BLOOD QUANT: CPT | Performed by: INTERNAL MEDICINE

## 2022-12-30 PROCEDURE — 83036 HEMOGLOBIN GLYCOSYLATED A1C: CPT | Performed by: INTERNAL MEDICINE

## 2022-12-30 RX ORDER — SEMAGLUTIDE 1.34 MG/ML
1 INJECTION, SOLUTION SUBCUTANEOUS
Qty: 9 ML | Refills: 3 | Status: SHIPPED | OUTPATIENT
Start: 2022-12-30

## 2022-12-30 NOTE — PROGRESS NOTES
Specialty Pharmacy Patient Management Program  Endocrinology Reassessment     Renata Lynn is a 72 y.o. female seen by an Endocrinology provider for Type 2 Diabetes and Hyperlipidemia and enrolled in the Endocrinology Patient Management program offered by [facility].  A follow-up outreach was conducted, including assessment of continued therapy appropriateness, medication adherence, and side effect incidence and management for Ozempic and Vascepa.    Changes to Insurance Coverage or Financial Support  None     Relevant Past Medical History and Comorbidities  Relevant medical history and concomitant health conditions were discussed with the patient. The patient's chart has been reviewed for relevant past medical history and comorbid health conditions and updated as necessary.   Past Medical History:   Diagnosis Date   • Asthma ?  2022    Listed on hospital release form   • Atrial fibrillation (HCC) Oct  27, 2023   • Basal cell carcinoma    • Hypertensive disorder    • Mixed hyperlipidemia    • Obesity     body mass index 30+-obesity   • Sleep apnea 2000 ?   • Type 2 diabetes mellitus, uncontrolled      Social History     Socioeconomic History   • Marital status:    Tobacco Use   • Smoking status: Former     Packs/day: 1.00     Years: 20.00     Pack years: 20.00     Types: Cigarettes   • Smokeless tobacco: Never   • Tobacco comments:     Started late teens stopped late 30s   Vaping Use   • Vaping Use: Never used   Substance and Sexual Activity   • Alcohol use: Yes     Comment: occasional   • Drug use: Never   • Sexual activity: Yes     Partners: Male     Birth control/protection: Condom, Spermicide, Injection, Birth control pill       Problem list reviewed by Lucia Cid, Karen on 12/30/2022 at  1:39 PM    Allergies  Known allergies and reactions were discussed with the patient. The patient's chart has been reviewed for allergy information and updated as necessary.    Penicillins    Allergies reviewed by Lucia Cid, PharmD on 12/30/2022 at  1:39 PM    Relevant Laboratory Values  A1C Last 3 Results    HGBA1C Last 3 Results 12/30/22   Hemoglobin A1C 7.8           Lab Results   Component Value Date    HGBA1C 7.8 12/30/2022     Lab Results   Component Value Date    GLUCOSE 375 (H) 09/27/2022    CALCIUM 10.0 09/27/2022     (L) 09/27/2022    K 4.6 09/27/2022    CO2 25.0 09/27/2022    CL 98 09/27/2022    BUN 16 09/27/2022    CREATININE 0.75 09/27/2022    EGFRIFAFRI 94 08/04/2021    EGFRIFNONA 78 08/04/2021    BCR 21.3 09/27/2022    ANIONGAP 12.0 09/27/2022     Lab Results   Component Value Date    CHLPL 170 08/04/2021    TRIG 471 (H) 08/04/2021    HDL 34 (L) 08/04/2021    LDL 64 08/04/2021     Current Medication List  This medication list has been reviewed with the patient and evaluated for any interactions or necessary modifications/recommendations, and updated to include all prescription medications, OTC medications, and supplements the patient is currently taking.  This list reflects what is contained in the patient's profile, which has also been marked as reviewed to communicate to other providers it is the most up to date version of the patient's current medication therapy.     Current Outpatient Medications:   •  Accu-Chek FastClix Lancets mis, Test blood glucose daily and as needed., Disp: 300 each, Rfl: 3  •  apixaban (ELIQUIS) 5 MG tablet tablet, Take 1 tablet by mouth Every 12 (Twelve) Hours., Disp: 180 tablet, Rfl: 2  •  atorvastatin (LIPITOR) 10 MG tablet, Take 1 tablet by mouth every night at bedtime., Disp: 90 tablet, Rfl: 0  •  B Complex Vitamins (VITAMIN B COMPLEX PO), Take  by mouth Daily., Disp: , Rfl:   •  calcium carbonate (OS-SHANICE) 600 MG tablet, Take 1,200 mg by mouth Daily., Disp: , Rfl:   •  carvedilol (COREG) 25 MG tablet, Take 1 tablet by mouth 2 (Two) Times a Day., Disp: 180 tablet, Rfl: 3  •  esomeprazole (nexIUM) 20 MG capsule, Take 20 mg by  mouth Every Morning Before Breakfast., Disp: , Rfl:   •  glucosamine-chondroitin 500-400 MG capsule capsule, Take  by mouth 2 (Two) Times a Day., Disp: , Rfl:   •  glucose blood (Accu-Chek Guide) test strip, Test blood glucose daily and as needed., Disp: 300 each, Rfl: 3  •  hydroCHLOROthiazide (HYDRODIURIL) 25 MG tablet, Take 1 tablet by mouth Daily., Disp: 90 tablet, Rfl: 0  •  ipratropium (ATROVENT) 0.06 % nasal spray, Use 1 spray in the nostril(s) three times daily, Disp: 15 mL, Rfl: 3  •  Lacrisert (LACRISERT) 5 MG insert, Insert under each eyelid daily, Disp: 180 each, Rfl: 3  •  metFORMIN (GLUCOPHAGE) 1000 MG tablet, Take 1 tablet by mouth 2 (Two) Times a Day With Meals., Disp: 180 tablet, Rfl: 3  •  montelukast (SINGULAIR) 10 MG tablet, Take 1 tablet by mouth Daily., Disp: 90 tablet, Rfl: 2  •  multivitamin (THERAGRAN) tablet tablet, Take  by mouth Daily., Disp: , Rfl:   •  multivitamin with minerals tablet tablet, Take 1 tablet by mouth Daily., Disp: , Rfl:   •  ofloxacin (OCUFLOX) 0.3 % ophthalmic solution, Apply 5 drops to left ear twice daily, Disp: 10 mL, Rfl: 1  •  Semaglutide, 1 MG/DOSE, (Ozempic, 1 MG/DOSE,) 4 MG/3ML solution pen-injector, Inject 1 mg under the skin into the appropriate area as directed Every 7 (Seven) Days., Disp: 9 mL, Rfl: 3  •  valACYclovir (VALTREX) 1000 MG tablet, Take 1 tablet by mouth Daily for 3 days as Needed., Disp: 30 tablet, Rfl: 2  •  valsartan (DIOVAN) 320 MG tablet, Take 1 tablet by mouth Daily., Disp: 90 tablet, Rfl: 0  •  Vascepa 1 g capsule capsule, Take 2 capsules (2 grams) by mouth 2 (Two) Times a Day., Disp: 360 capsule, Rfl: 3  •  vitamin D3 125 MCG (5000 UT) capsule capsule, Take 5,000 Units by mouth Daily., Disp: , Rfl:   •  vitamin E 100 UNIT capsule, Take 200 Units by mouth Daily., Disp: , Rfl:   •  Zinc Sulfate (ZINC-220 PO), Take  by mouth Daily., Disp: , Rfl:   No current facility-administered medications for this visit.    Medicines reviewed by Palak  Karen Myers on 12/30/2022 at  1:39 PM    Drug Interactions  No Clinically Significant DDIs Were Identified at Present Time Upon Marking Medication Reviewed    Recommended Medications Assessment  • Aspirin - Not Indicated Discontinued by Cardiology, Pt Taking Eliquis  • Statin - Currently Taking   • ACEi/ARB - Currently Taking     Adverse Drug Reactions  • Adverse Reactions Experienced: None Reported  • Plan for ADR Management: N/A    Hospitalizations and Urgent Care Since Last Assessment  • Hospitalizations or Admissions: 9/27/22, A-Fib w/RVR. Spontaneous cardioversion. Eliquis started for PAF. ASA Discontinued by Cardiology.  • ED Visits: See Above  • Urgent Office Visits: None    Adherence, Self-Administration, and Current Therapy Problems  Adherence related patient's specialty therapy was discussed with the patient. The Adherence segment of this outreach has been reviewed and updated.     Adherence Questions  Medication(s) assessed: Ozempic, Vascepa  On average, how many doses/injections does the patient miss per month?: 0  What are the identified reasons for non-adherence or missed doses? : no problems identfied  What is the estimated medication adherence level?: %  Based on the patient/caregiver response and refill history, does this patient require an MTP to track adherence improvements?: no  • Ongoing or New Barriers to Patient Self-Administration: None Reported or Identified  • Methods for Supporting Patient Self-Administration: N/A      Medication Therapy Recommendations  No medication therapy recommendations to display     Goals of Therapy  Goals related to the patient's specialty therapy was discussed with the patient. The Patient Goals segment of this outreach has been reviewed and updated.    Goals     • Therapeutic/Clinical Target      HbA1c <7%     3/16/22: No new values since last appointment on 12/17/21 (7.1%). Patient has since been working on diet/exercise and reports being fully adherent  to regimen - marking today's progress toward goal on track until next labs, which will be compared to goal on next reassessment. KL     9/2/22: No new values since last appointment on 12/17/21 (7.1%). Patient continues to work on diet/exercise and reports adherence. Patient had to cancel most recent scheduled appointment on 8/22/22. Next appointment in December. Patient will have new value at that time. Rescheduling next assessment early to align with this appointment.  KL     11/8/22: 8.3% Labs Obtained Outside System    12/30/22: 7.8%  Patient A1c increased to 8.3 then came back down to 7.8 since last visit, but overall patient has regressed. Provider increasing Ozempic today to 1mg. Will continue to evaluate on reassessment and take further action if goal continues to be not met after this medication change. KL      • Therapeutic/Clinical Target      TGs Trending Downward (Goal: <150)     3/16/22: No new values since last labs 08/04//21 (471). Patient has since been working on diet/exercise and reports being fully adherent to regimen - marking today's progress toward goal on track until next labs, which will be compared to goal on next reassessment. KL     9/2/22: No new values since last labs 08/04//21 (471). Patient has since been working on diet/exercise and reports being fully adherent to regimen - marking today's progress toward goal on track until next labs, which will be compared to goal on next reassessment. Patient canceled appt on 8/22/22, next appt in December 2022. Aligning next refill to that appointment to reassess labs and progress toward goal at that time. KL     12/30/22: Patient still reports full adherence. Most recent labs from 11/8/22 show TG at 343, so somewhat improved. Will continue current regimen. Total cholesterol is normal and provider increased Trulicity today for diabetes, TG likely secondary to uncontrolled glucose. Will continue to monitor. KL            Quality of Life Assessment    Quality of Life related to the patient's specialty therapy was discussed with the patient. The QOL segment of this outreach has been reviewed and updated.     Quality of Life Assessment  Quality of Life Improvement Scale: Moderately better    Reassessment Plan & Follow-Up  1. Medication Therapy Changes: Increase Ozempic to 1 mg Once Weekly  2. Additional Plans, Therapy Recommendations, or Therapy Problems to Be Addressed: None  3. Pharmacist to perform regular reassessments no more than (6) months from the previous assessment.  4. Care Coordinator to set up future refill outreaches, coordinate prescription delivery, and escalate clinical questions to pharmacist.     Attestation  I attest that the specialty medication(s) addressed above are appropriate for the patient based on my reassessment.  If the prescribed therapy is at any point deemed not appropriate based on the current or future assessments, a consultation will be initiated with the patient's specialty care provider to determine the best course of action. The revised plan of therapy will be documented along with any additional patient education provided.     Lucia Cid, Karen, BCACP   Clinical Specialty Pharmacist, Endocrinology  12/30/2022  13:49 EST

## 2022-12-30 NOTE — PROGRESS NOTES
"     Office Note      Date: 2022  Patient Name: Renata Lynn  MRN: [id number]  : 1950    Chief Complaint   Patient presents with   • Diabetes       History of Present Illness:   Renata Lynn is a 72 y.o. female who presents for Diabetes type 2. Diagnosed in: 2018. Treated in past with oral agents. Current treatments: metformin and ozempic. Number of insulin shots per day: none. Checks blood sugar two times a week. Has low blood sugar: no. Aspirin use: yes. Statin use: Yes and vascepa. ACE-I/ARB use: Yes. Changes in health since last visit: a.fib. Last eye exam 2022.    Subjective      Diabetic Complications:  Eyes: No  Kidneys: No  Feet: No  Heart: No    Diet and Exercise:  Meals per day: 3  Minutes of exercise per week: 0 mins.    Review of Systems:   Review of Systems   Constitutional: Negative.    Cardiovascular: Negative.    Gastrointestinal: Negative.    Endocrine: Negative.        The following portions of the patient's history were reviewed and updated as appropriate: allergies, current medications, past family history, past medical history, past social history, past surgical history and problem list.    Objective       Visit Vitals  /73   Pulse 75   Ht 167.6 cm (66\")   Wt 92.1 kg (203 lb)   SpO2 95%   BMI 32.77 kg/m²       Physical Exam:  Physical Exam  Constitutional:       Appearance: Normal appearance.   Neurological:      Mental Status: She is alert.         Labs:    HbA1c  Lab Results   Component Value Date    HGBA1C 7.8 2022       CMP  Lab Results   Component Value Date    GLUCOSE 375 (H) 2022    BUN 16 2022    CREATININE 0.75 2022    EGFRIFNONA 78 2021    EGFRIFAFRI 94 2021    BCR 21.3 2022    K 4.6 2022    CO2 25.0 2022    CALCIUM 10.0 2022    PROTENTOTREF 7.3 2021    LABIL2 1.8 2021    AST 77 (H) 2022    ALT 67 (H) 2022        Lipid Panel  Lab Results   Component Value Date    CHLPL 170 " 08/04/2021    HDL 34 (L) 08/04/2021    LDL 64 08/04/2021    TRIG 471 (H) 08/04/2021        TSH  Lab Results   Component Value Date    TSH 1.940 12/16/2020        Hemoglobin A1C  Lab Results   Component Value Date    HGBA1C 7.8 12/30/2022        Microalbumin/Creatinine  Lab Results   Component Value Date    MALBCRERATIO 22 12/16/2020    CREATINIURIN 29.1 12/16/2020    MICROALBUR 6.5 12/16/2020           Assessment / Plan      Assessment & Plan:  Diagnoses and all orders for this visit:    1. Uncontrolled type 2 diabetes mellitus with hyperglycemia (HCC) (Primary)  Assessment & Plan:  Diabetes is worsening.   Titrate up ozempic.  Diabetes will be reassessed in 3 months.    Orders:  -     POC Glucose, Blood  -     POC Glycosylated Hemoglobin (Hb A1C)  -     Ambulatory Referral to Diabetic Education    2. Benign hypertension  Assessment & Plan:  Hypertension is improving with treatment.  Continue current treatment regimen.  Blood pressure will be reassessed at the next regular appointment.      3. Mixed hyperlipidemia  Assessment & Plan:  Continue statin and vascepa.      Other orders  -     Semaglutide, 1 MG/DOSE, (Ozempic, 1 MG/DOSE,) 4 MG/3ML solution pen-injector; Inject 1 mg under the skin into the appropriate area as directed Every 7 (Seven) Days.  Dispense: 9 mL; Refill: 3    Current Outpatient Medications   Medication Instructions   • Accu-Chek FastClix Lancets misc Test blood glucose daily and as needed.   • atorvastatin (LIPITOR) 10 mg, Oral, Every Night at Bedtime   • B Complex Vitamins (VITAMIN B COMPLEX PO) Oral, Daily   • calcium carbonate (OS-SHANICE) 1,200 mg, Oral, Daily   • carvedilol (COREG) 25 mg, Oral, 2 Times Daily   • Eliquis 5 mg, Oral, Every 12 Hours Scheduled   • esomeprazole (NEXIUM) 20 mg, Oral, Every Morning Before Breakfast   • glucosamine-chondroitin 500-400 MG capsule capsule Oral, 2 Times Daily   • glucose blood (Accu-Chek Guide) test strip Test blood glucose daily and as needed.   •  hydroCHLOROthiazide (HYDRODIURIL) 25 mg, Oral, Daily   • ipratropium (ATROVENT) 0.06 % nasal spray Use 1 spray in the nostril(s) three times daily   • Lacrisert (LACRISERT) 5 MG insert Insert under each eyelid daily   • metFORMIN (GLUCOPHAGE) 1,000 mg, Oral, 2 Times Daily With Meals   • montelukast (SINGULAIR) 10 mg, Oral, Daily   • multivitamin (THERAGRAN) tablet tablet Oral, Daily   • multivitamin with minerals tablet tablet 1 tablet, Oral, Daily   • ofloxacin (OCUFLOX) 0.3 % ophthalmic solution Apply 5 drops to left ear twice daily   • Ozempic (1 MG/DOSE) 1 mg, Subcutaneous, Every 7 Days   • valACYclovir (VALTREX) 1000 MG tablet Take 1 tablet by mouth Daily for 3 days as Needed.   • valsartan (DIOVAN) 320 mg, Oral, Daily   • Vascepa 1 g capsule capsule Take 2 capsules (2 grams) by mouth 2 (Two) Times a Day.   • vitamin D3 5,000 Units, Oral, Daily   • vitamin E 200 Units, Oral, Daily   • Zinc Sulfate (ZINC-220 PO) Oral, Daily      Return in about 3 months (around 3/30/2023) for Recheck with A1c, CMP, lipid, TSH, microalbumin, foot exam.    Leroy Whipple MD   12/30/2022

## 2023-01-03 ENCOUNTER — CLINICAL SUPPORT (OUTPATIENT)
Dept: ORTHOPEDIC SURGERY | Facility: CLINIC | Age: 73
End: 2023-01-03
Payer: MEDICARE

## 2023-01-03 DIAGNOSIS — M17.12 PRIMARY OSTEOARTHRITIS OF LEFT KNEE: Primary | ICD-10-CM

## 2023-01-03 PROCEDURE — 20610 DRAIN/INJ JOINT/BURSA W/O US: CPT | Performed by: ORTHOPAEDIC SURGERY

## 2023-01-03 RX ORDER — LIDOCAINE HYDROCHLORIDE 10 MG/ML
2 INJECTION, SOLUTION EPIDURAL; INFILTRATION; INTRACAUDAL; PERINEURAL
Status: COMPLETED | OUTPATIENT
Start: 2023-01-03 | End: 2023-01-03

## 2023-01-03 RX ADMIN — LIDOCAINE HYDROCHLORIDE 2 ML: 10 INJECTION, SOLUTION EPIDURAL; INFILTRATION; INTRACAUDAL; PERINEURAL at 08:53

## 2023-01-03 NOTE — PROGRESS NOTES
Procedure Note:    I discussed with the patient the potential benefits of performing a therapeutic injections as well as potential risks including but not limited to infection, swelling, pain, bleeding, bruising, nerve/vessel damage, skin color changes, transient elevation in blood glucose levels, and fat atrophy. After informed consent and after the areas were prepped with chlorhexadine soap, ethyl chloride was used to numb the skin. Via the superiorlateral approach, 3mL of 1% lidocaine followed by Orthovisc No. 3 were each injected into the left knee joint. The patient tolerated the procedure well. There were no complications. A sterile dressing was placed over the injection sites.      Appointment will be made for 3 months but the patient will call and cancel if she is having no pain as she did last time.

## 2023-01-03 NOTE — PROGRESS NOTES
Procedure   - Large Joint Arthrocentesis: L knee on 1/3/2023 8:53 AM  Indications: pain  Details: (23g) needle, anterolateral approach  Medications: 30 mg Hyaluronan 30 MG/2ML; 2 mL lidocaine PF 1% 1 %  Outcome: tolerated well, no immediate complications  Procedure, treatment alternatives, risks and benefits explained, specific risks discussed. Consent was given by the patient. Immediately prior to procedure a time out was called to verify the correct patient, procedure, equipment, support staff and site/side marked as required. Patient was prepped and draped in the usual sterile fashion.

## 2023-01-12 ENCOUNTER — OFFICE VISIT (OUTPATIENT)
Dept: DIABETES SERVICES | Facility: HOSPITAL | Age: 73
End: 2023-01-12
Payer: MEDICARE

## 2023-01-12 PROCEDURE — G0108 DIAB MANAGE TRN  PER INDIV: HCPCS

## 2023-01-12 NOTE — CONSULTS
Diabetes Education    Patient Name:  Renata Lynn  YOB: 1950  MRN: [id number]  Admit Date:  (Not on file)        1hr DM education provided to address pt concerns, session was primarily nutrition focused. Please see Media tab for full session details. Pt was receptive to recommendations and engaged in discussion. Will fup in ~ 4 weeks via telephone. Thank you for the referral.      Electronically signed by:  Heena Paul  01/12/23 13:17 EST

## 2023-01-18 RX ORDER — ICOSAPENT ETHYL 1000 MG/1
2 CAPSULE ORAL 2 TIMES DAILY
Qty: 360 CAPSULE | Refills: 3 | Status: SHIPPED | OUTPATIENT
Start: 2023-01-18

## 2023-01-18 NOTE — TELEPHONE ENCOUNTER
Specialty Pharmacy Patient Management Program  Prescription Refill Request     Patient currently fills medications at  Pharmacy. Needing refill(s) on the following:      Requested Prescriptions     Pending Prescriptions Disp Refills   • Vascepa 1 g capsule capsule 360 capsule 3     Sig: Take 2 capsules (2 grams) by mouth 2 (Two) Times a Day.     Pended for Dr. Whipple to review, and approve if appropriate.     Lucia Cid, PharmD, BCACP  Clinical Specialty Pharmacist, Endocrinology  1/18/2023  08:07 EST

## 2023-01-19 ENCOUNTER — SPECIALTY PHARMACY (OUTPATIENT)
Dept: ENDOCRINOLOGY | Facility: CLINIC | Age: 73
End: 2023-01-19
Payer: MEDICARE

## 2023-01-19 NOTE — PROGRESS NOTES
Specialty Pharmacy Patient Management Program  Endocrinology Refill Outreach      Renata is a 72 y.o. female contacted today regarding refills of her medication(s).    Specialty medication(s) and dose(s) confirmed: Vascepa and Ozempic  Other medications being refilled: Eliquis, HCTZ, Coreg, Lipitor, Singulair, Valex     Refill Questions    Flowsheet Row Most Recent Value   Changes to allergies? No   Changes to medications? No   New conditions since last clinic visit No   Unplanned office visit, urgent care, ED, or hospital admission in the last 4 weeks  No   How does patient/caregiver feel medication is working? Very good   Financial problems or insurance changes  No   Since the previous refill, were any specialty medication doses or scheduled injections missed or delayed?  No   Does this patient require a clinical escalation to a pharmacist? No          Delivery Questions    Flowsheet Row Most Recent Value   Delivery method FedEx   Delivery address correct? Yes   Delivery phone number [phone]   Preferred delivery time? Anytime   Number of medications in delivery 8   Medication being filled and delivered Lipitor, Coreg, Eliquis, HCTZ, Singulair, Ozempic, Valtrex, Vascepa   Is there any medication that is due not being filled? No   Supplies needed? No supplies needed   Cooler needed? Yes   Do any medications need mixed or dated? No   Copay form of payment Credit card on file   Additional comments Copay $215.71   Questions or concerns for the pharmacist? No   Are any medications first time fills? No   Shipment status Cooler packed, Delivery complete          Medication Adherence    Support network for adherence: family member, healthcare provider   Other support network: Pharmacy              Follow-up: 30 Days     Clarisse Plascencia, Care Coordinator   Endocrinology  1/19/2023  11:45 EST

## 2023-01-26 ENCOUNTER — OFFICE VISIT (OUTPATIENT)
Dept: CARDIOLOGY | Facility: HOSPITAL | Age: 73
End: 2023-01-26
Payer: MEDICARE

## 2023-01-26 ENCOUNTER — HOSPITAL ENCOUNTER (OUTPATIENT)
Dept: CARDIOLOGY | Facility: HOSPITAL | Age: 73
Discharge: HOME | End: 2023-01-26
Payer: MEDICARE

## 2023-01-26 VITALS
RESPIRATION RATE: 20 BRPM | WEIGHT: 202.5 LBS | HEART RATE: 79 BPM | BODY MASS INDEX: 32.54 KG/M2 | HEIGHT: 66 IN | SYSTOLIC BLOOD PRESSURE: 146 MMHG | TEMPERATURE: 98.8 F | OXYGEN SATURATION: 96 % | DIASTOLIC BLOOD PRESSURE: 69 MMHG

## 2023-01-26 DIAGNOSIS — I10 PRIMARY HYPERTENSION: ICD-10-CM

## 2023-01-26 DIAGNOSIS — R00.2 PALPITATIONS: ICD-10-CM

## 2023-01-26 DIAGNOSIS — I48.0 PAROXYSMAL ATRIAL FIBRILLATION: ICD-10-CM

## 2023-01-26 DIAGNOSIS — I48.0 PAROXYSMAL ATRIAL FIBRILLATION: Primary | ICD-10-CM

## 2023-01-26 PROCEDURE — 99214 OFFICE O/P EST MOD 30 MIN: CPT | Performed by: NURSE PRACTITIONER

## 2023-01-26 PROCEDURE — 93246 EXT ECG>7D<15D RECORDING: CPT

## 2023-01-26 RX ORDER — DILTIAZEM HYDROCHLORIDE 120 MG/1
120 CAPSULE, EXTENDED RELEASE ORAL DAILY
Qty: 30 CAPSULE | Refills: 3 | Status: SHIPPED | OUTPATIENT
Start: 2023-01-26

## 2023-01-26 NOTE — PROGRESS NOTES
[facility] Heart Monitor Documentation    Renata Lynn  1950  [id number]  01/26/23      [] ZIO XT Patch  Model [id number] Prescribed for N/A Days    · Serial Number: (N + 9 Digits) N   · Apply-By Date on Box:   · USPS Tracking Number:   · USPS Tracking        [] [employer] BodyGuardian MINI PLUS Mobile Cardiac Telemetry  Model BGMINIPLUS Prescribed for N/A Days    · Serial Number: (BGM + 7 Digits) BGM  · Shipped-By Date on Box:   · UPS Tracking Number: 1Z  · UPS Tracking      [] [employer] BodyGuardian MINI Holter Monitor  Model BGMINIEL Prescribed for 14 Days    · Serial Number: (7 Digits) [id number]  · Shipped-By Date on Box: 59376  · UPS Tracking Number: [id number]  · UPS Tracking        This monitor was applied to the patient's chest and checked for proper functioning.  Ms. Renata Lynn was instructed in the proper use of this monitor.  She was given the opportunity to ask questions and left the office with the device 's instruction manual.    Alla Russell MA, 09:53 EST, 01/26/23                  [facility]

## 2023-01-26 NOTE — PROGRESS NOTES
"[facility], [facility] Heart and Vascular    Chief Complaint  Atrial Fibrillation and Follow-up    Subjective    History of Present Illness {CC  Problem List  Visit  Diagnosis   Encounters  Notes  Medications  Labs  Result Review Imaging  Media :23}     Renata Lynn presents to [facility] CARDIOLOGY for   History of Present Illness     72-year-old female with hypertension, hyperlipidemia, diabetes type 2, obesity, GERD, PAF, CLEMENTE on CPAP.    Patient currently on carvedilol for heart rate control, Eliquis for stroke prevention.    Pt reports her fit bit shows intermittent rapid heart rate.  She reports increased fatigue moderate, mild dyspnea, pressure in chest.  Decreased activity tolerance.  Fair physical activity with rapid heart rates.  She will stop and lye down more often.  Occurs 1-2 times per week.  Duration 4-6 hours.      Patient does not keep a home blood pressure or heart rate log.  She will see an elevated heart rate on her Fitbit.  Always during exertion.    Objective     Vital Signs:   Vitals:    01/26/23 0855   BP: 146/69   BP Location: Right arm   Patient Position: Sitting   Cuff Size: Adult   Pulse: 79   Resp: 20   Temp: 98.8 °F (37.1 °C)   TempSrc: Temporal   SpO2: 96%   Weight: 91.9 kg (202 lb 8 oz)   Height: 167.6 cm (66\")     Body mass index is 32.68 kg/m².  Physical Exam  Vitals reviewed.   Constitutional:       General: She is not in acute distress.     Appearance: Normal appearance.   Cardiovascular:      Rate and Rhythm: Normal rate and regular rhythm.      Pulses:           Radial pulses are 2+ on the right side and 2+ on the left side.        Dorsalis pedis pulses are 2+ on the right side and 2+ on the left side.        Posterior tibial pulses are 2+ on the right side and 2+ on the left side.      Heart sounds: Normal heart sounds.   Pulmonary:      Effort: Pulmonary effort is normal.      Breath sounds: Normal breath sounds. "   Musculoskeletal:      Right lower leg: No edema.      Left lower leg: No edema.   Skin:     General: Skin is warm and dry.   Neurological:      Mental Status: She is alert.   Psychiatric:         Mood and Affect: Mood normal.         Behavior: Behavior is cooperative.              Result Review  Data Reviewed:{ Labs  Result Review  Imaging  Med Tab  Media :23}                 Assessment and Plan {CC Problem List  Visit Diagnosis  ROS  Review (Popup)  Health Maintenance  Quality  BestPractice  Medications  SmartSets  SnapShot Encounters  Media :23}   1. Paroxysmal atrial fibrillation (HCC)  Patient to continue carvedilol and Eliquis.  Will initiate calcium channel blocker:  - dilTIAZem XR (DILACOR XR) 120 MG 24 hr capsule; Take 1 capsule by mouth Daily.  Dispense: 30 capsule; Refill: 3      - Holter Monitor - 72 Hour Up To 15 Days; Future    2. Palpitations  Unclear if rapid heart rate with exertion is atrial fibrillation or fear of rapid heart rate.  If we see frequent A. fib recurrences would consider antiarrhythmic.    - Holter Monitor - 72 Hour Up To 15 Days; Future    3. Primary hypertension  Blood pressure not at goal today.    Continue carvedilol, valsartan, hydrochlorothiazide.  Initiate diltiazem as prescribed.          Follow Up {Instructions Charge Capture  Follow-up Communications :23}   Return in about 4 weeks (around 2/23/2023) for Office visit, monitor results, HTN, afib. .    Patient was given instructions and counseling regarding her condition or for health maintenance advice. Please see specific information pulled into the AVS if appropriate.  Patient was instructed to call the [facility] with any questions, concerns, or worsening symptoms.

## 2023-01-27 DIAGNOSIS — I10 PRIMARY HYPERTENSION: ICD-10-CM

## 2023-01-27 RX ORDER — VALSARTAN 320 MG/1
320 TABLET ORAL DAILY
Qty: 90 TABLET | Refills: 1
Start: 2023-01-27 | End: 2023-01-27

## 2023-01-27 RX ORDER — VALSARTAN 320 MG/1
320 TABLET ORAL DAILY
Qty: 90 TABLET | Refills: 0 | Status: SHIPPED | OUTPATIENT
Start: 2023-01-27

## 2023-01-27 NOTE — TELEPHONE ENCOUNTER
Failed to send prescription after visit on 1/26/23. Resent to [facility] for 90 day supply plus 1 refill

## 2023-02-14 ENCOUNTER — SPECIALTY PHARMACY (OUTPATIENT)
Dept: ENDOCRINOLOGY | Facility: CLINIC | Age: 73
End: 2023-02-14
Payer: MEDICARE

## 2023-02-14 NOTE — PROGRESS NOTES
Specialty Pharmacy Patient Management Program  Endocrinology Refill Outreach      Renata is a 72 y.o. female contacted today regarding refills of her medication(s).    Specialty medication(s) and dose(s) confirmed: na  Other medications being refilled: Valtrex, Test Strips/Lancets, Valsartan     Refill Questions    Flowsheet Row Most Recent Value   Changes to allergies? No   Changes to medications? No   New conditions since last clinic visit No   Unplanned office visit, urgent care, ED, or hospital admission in the last 4 weeks  No   How does patient/caregiver feel medication is working? Very good   Financial problems or insurance changes  No   Since the previous refill, were any specialty medication doses or scheduled injections missed or delayed?  No   Does this patient require a clinical escalation to a pharmacist? No          Delivery Questions    Flowsheet Row Most Recent Value   Delivery method FedEx   Delivery address correct? Yes   Delivery phone number [phone]   Preferred delivery time? Anytime   Number of medications in delivery 4   Medication being filled and delivered Test Strips, Lancets, Valtrex, Diovan   Is there any medication that is due not being filled? No   Supplies needed? No supplies needed   Cooler needed? No   Do any medications need mixed or dated? No   Copay form of payment Credit card on file   Additional comments Copay $30   Questions or concerns for the pharmacist? No   Are any medications first time fills? No   Shipment status Delivery complete          Medication Adherence    Support network for adherence: family member, healthcare provider   Other support network: Pharmacy              Follow-up: 30 Days      Clarisse Plascencia, Care Coordinator   Endocrinology  2/14/2023  11:17 EST

## 2023-02-23 ENCOUNTER — OFFICE VISIT (OUTPATIENT)
Dept: CARDIOLOGY | Facility: HOSPITAL | Age: 73
End: 2023-02-23
Payer: MEDICARE

## 2023-02-23 VITALS
HEART RATE: 77 BPM | HEIGHT: 66 IN | BODY MASS INDEX: 33.23 KG/M2 | WEIGHT: 206.8 LBS | SYSTOLIC BLOOD PRESSURE: 142 MMHG | DIASTOLIC BLOOD PRESSURE: 78 MMHG | RESPIRATION RATE: 17 BRPM | TEMPERATURE: 96.8 F | OXYGEN SATURATION: 97 %

## 2023-02-23 DIAGNOSIS — I47.1 PAROXYSMAL SVT (SUPRAVENTRICULAR TACHYCARDIA): ICD-10-CM

## 2023-02-23 DIAGNOSIS — I48.0 PAROXYSMAL ATRIAL FIBRILLATION: Primary | ICD-10-CM

## 2023-02-23 DIAGNOSIS — Z01.810 PREPROCEDURAL CARDIOVASCULAR EXAMINATION: ICD-10-CM

## 2023-02-23 DIAGNOSIS — I10 PRIMARY HYPERTENSION: ICD-10-CM

## 2023-02-23 PROCEDURE — 99214 OFFICE O/P EST MOD 30 MIN: CPT | Performed by: NURSE PRACTITIONER

## 2023-02-23 NOTE — PROGRESS NOTES
"[facility], [facility] Heart and Vascular    Chief Complaint  Follow-up and Results (Holter monitor.  Palpitations. )    Subjective    History of Present Illness {CC  Problem List  Visit  Diagnosis   Encounters  Notes  Medications  Labs  Result Review Imaging  Media :23}     Renata Lynn presents to [facility] CARDIOLOGY for   History of Present Illness     72-year-old female with hypertension, hyperlipidemia, diabetes type 2, obesity, GERD, PAF, CLEMENTE on CPAP.    Patient currently on carvedilol for rate control, Eliquis for stroke prevention.    Reports intermittent palpitations.  Associated with increased fatigue, dyspnea, chest pressure, decreased activity tolerance.    Myocardial perfusion stress test 10/26/2022: No ischemia    Echocardiogram 10/26/2022: EF 61 to 65%, grade 1 diastolic dysfunction, RVSP 21 mmHg.  No significant valvular disease    Diltiazem added last office visit.    Cardiac heart monitor 1/26/2023: 14-day duration.  Average heart rate 78 bpm.  Atrial fibrillation longest duration 2 hours 58 minutes.  A-fib burden 1.24%.  Patient with frequent SVT runs.  Longest duration 38 beats.  PACs less than 1%.  Patient reported symptoms with A-fib.    Overall palpitations have improved.  Less frequent, not as severe and does not last as long.  Improved fatigue and dyspnea.  No CP or pressure.      Pt is scheduled for cosmetic eye surgery (eyelid).Surgery is scheduled on 2/27/2023.      Objective     Vital Signs:   Vitals:    02/23/23 0920 02/23/23 0935   BP: 173/76 142/78   BP Location: Left arm    Patient Position: Sitting    Cuff Size: Adult    Pulse: 77    Resp: 17    Temp: 96.8 °F (36 °C)    TempSrc: Temporal    SpO2: 97%    Weight: 93.8 kg (206 lb 12.8 oz)    Height: 167.6 cm (66\")      Body mass index is 33.38 kg/m².  Physical Exam         Result Review  Data Reviewed:{ Labs  Result Review  Imaging  Med Tab  Media :23}   Myocardial " perfusion stress test 10/26/2022: No ischemia    Echocardiogram 10/26/2022: EF 61 to 65%, grade 1 diastolic dysfunction, RVSP 21 mmHg.  No significant valvular disease    Cardiac heart monitor 1/26/2023: 14-day duration.  Average heart rate 78 bpm.  Atrial fibrillation longest duration 2 hours 58 minutes.  A-fib burden 1.24%.  Patient with frequent SVT runs.  Longest duration 38 beats.  PACs less than 1%.  Patient reported symptoms with A-fib.                  Assessment and Plan {CC Problem List  Visit Diagnosis  ROS  Review (Popup)  [facility] Maintenance  Quality  BestPractice  Medications  SmartSets  SnapShot Encounters  Media :23}   1. Paroxysmal atrial fibrillation (HCC)  PAF noted on heart monitor.  Patient symptoms have improved with initiation of diltiazem.  Less frequent, less symptomatic overall improvement in wellbeing and function    Eliquis for stroke prevention  2. Paroxysmal SVT (supraventricular tachycardia) (HCC)  Palpitations have improved.  Frequent brief SVT noted on previous monitor.  Continue beta-blocker, calcium channel blocker      3. Primary hypertension  Blood pressure initially elevated.  Improved with recheck.  Encourage patient to keep home blood pressure log.    Continue diltiazem, carvedilol, valsartan    4. Preprocedural cardiovascular examination  Patient has scheduled cosmetic eyelid removal surgery scheduled on 2/27/2023.    Patient to hold Eliquis 2 days prior.  Last Eliquis dose Friday night 2/24/2023.    To resume the day after surgery if hemodynamically stable.    Revised Cardiac Risk Index Tool     Patient Name:  Renata Lynn       MRN: [id number]                    Age: 72 y.o.     YOB: 1950    Clinical Risk Factors:        N/A    Risk of MI, Pulmonary Embolus, V-Fib, Cardiac Arrest or Complete Heart Block:        0 = (0.5% risk)    Rate of Cardiac Death, NonFatal MI and NonFatal Cardiac Arrest:        No risk factors = 0.4% (95% CI 0.1 -  0.8)    Comments:        Careful hemodynamic control (I.e, HR 60-70 bpm, no hypotension)       See Eliquis recs above   Continue Coreg and diltiazem is ok by sx.     Pt is stable. Low risk for cardiac complications.  No further testing at this time.   Edwinalejandro Roberto, APRN  02/23/23, 9:45 AM EST        Pt to f[facility]u with [facility] as schedule f/u [facility] a [email or web address]           Follow Up {Instructions Charge Capture  Follow-up Communications :23}   Return if symptoms worsen or fail to improve.    Patient was given instructions and counseling regarding her condition or for health maintenance advice. Please see specific information pulled into the AVS if appropriate.  Patient was instructed to call the [facility] with any questions, concerns, or worsening symptoms.

## 2023-02-23 NOTE — PATIENT INSTRUCTIONS
Hold Eliquis for cosmetic eye sx. 2 days before.  Start the day after sx if ok by surgeon.    Surgery date 02/27/23  Last dose of Eliquis , Friday night 02/24/23.

## 2023-02-24 ENCOUNTER — SPECIALTY PHARMACY (OUTPATIENT)
Dept: ENDOCRINOLOGY | Facility: CLINIC | Age: 73
End: 2023-02-24
Payer: MEDICARE

## 2023-02-24 NOTE — PROGRESS NOTES
Specialty Pharmacy Patient Management Program  Endocrinology Refill Outreach      Renata is a 72 y.o. female contacted today regarding refills of her medication(s).    Specialty medication(s) and dose(s) confirmed: na  Other medications being refilled: Atrovent and Diltiazem    Refill Questions    Flowsheet Row Most Recent Value   Changes to allergies? No   Changes to medications? No   New conditions since last clinic visit No   Unplanned office visit, urgent care, ED, or hospital admission in the last 4 weeks  No   How does patient/caregiver feel medication is working? Very good   Financial problems or insurance changes  No   Since the previous refill, were any specialty medication doses or scheduled injections missed or delayed?  No   Does this patient require a clinical escalation to a pharmacist? No          Delivery Questions    Flowsheet Row Most Recent Value   Delivery method FedEx   Delivery address correct? Yes   Delivery phone number [phone]   Preferred delivery time? Anytime   Number of medications in delivery 2   Medication being filled and delivered Diltiazem, Atrovent   Is there any medication that is due not being filled? No   Supplies needed? No supplies needed   Cooler needed? No   Do any medications need mixed or dated? No   Copay form of payment Credit card on file   Additional comments Copay $20   Questions or concerns for the pharmacist? No   Are any medications first time fills? No   Shipment status Delivery complete          Medication Adherence    Support network for adherence: family member, healthcare provider   Other support network: Pharmacy              Follow-up: 30 Days      Clarisse Plascencia, Care Coordinator   Endocrinology  2/24/2023  10:17 EST

## 2023-03-13 ENCOUNTER — SPECIALTY PHARMACY (OUTPATIENT)
Dept: ENDOCRINOLOGY | Facility: CLINIC | Age: 73
End: 2023-03-13
Payer: MEDICARE

## 2023-03-13 ENCOUNTER — SPECIALTY PHARMACY (OUTPATIENT)
Dept: ENDOCRINOLOGY | Facility: CLINIC | Age: 73
End: 2023-03-13

## 2023-03-13 NOTE — PROGRESS NOTES
Specialty Pharmacy Patient Management Program  Endocrinology Refill Outreach      Renata is a 72 y.o. female contacted today regarding refills of her medication(s).    Specialty medication(s) and dose(s) confirmed: na  Other medications being refilled: Valtrex and Metformin     Refill Questions    Flowsheet Row Most Recent Value   Changes to allergies? No   Changes to medications? No   New conditions since last clinic visit No   Unplanned office visit, urgent care, ED, or hospital admission in the last 4 weeks  No   How does patient/caregiver feel medication is working? Very good   Financial problems or insurance changes  No   Since the previous refill, were any specialty medication doses or scheduled injections missed or delayed?  No   Does this patient require a clinical escalation to a pharmacist? No          Delivery Questions    Flowsheet Row Most Recent Value   Delivery method FedEx   Delivery address correct? Yes   Delivery phone number [phone]   Preferred delivery time? Anytime   Number of medications in delivery 2   Medication being filled and delivered Metformin and Valtrex   Is there any medication that is due not being filled? No   Supplies needed? No supplies needed   Cooler needed? No   Do any medications need mixed or dated? No   Copay form of payment Credit card on file   Additional comments Copay $21.10   Questions or concerns for the pharmacist? No   Are any medications first time fills? No   Shipment status Delivery complete          Medication Adherence    Support network for adherence: family member, healthcare provider   Other support network: Pharmacy              Follow-up: 30 Days      Clarisse Plascencia, Care Coordinator   Endocrinology  3/13/2023  13:17 EDT

## 2023-03-21 ENCOUNTER — SPECIALTY PHARMACY (OUTPATIENT)
Dept: ENDOCRINOLOGY | Facility: CLINIC | Age: 73
End: 2023-03-21
Payer: MEDICARE

## 2023-03-21 NOTE — PROGRESS NOTES
Specialty Pharmacy Patient Management Program  Endocrinology Refill Outreach      Renata is a 72 y.o. female contacted today regarding refills of her medication(s).    Specialty medication(s) and dose(s) confirmed: na  Other medications being refilled: Diltiazem and Atrovent    Refill Questions    Flowsheet Row Most Recent Value   Changes to allergies? No   Changes to medications? No   New conditions since last clinic visit No   Unplanned office visit, urgent care, ED, or hospital admission in the last 4 weeks  No   How does patient/caregiver feel medication is working? Very good   Financial problems or insurance changes  No   Since the previous refill, were any specialty medication doses or scheduled injections missed or delayed?  No   Does this patient require a clinical escalation to a pharmacist? No          Delivery Questions    Flowsheet Row Most Recent Value   Delivery method FedEx   Delivery address correct? Yes   Delivery phone number [phone]   Preferred delivery time? Anytime   Number of medications in delivery 2   Medication being filled and delivered Diltiazem and Atrovent   Is there any medication that is due not being filled? No   Supplies needed? No supplies needed   Cooler needed? No   Do any medications need mixed or dated? No   Copay form of payment Credit card on file   Additional comments copay $20   Questions or concerns for the pharmacist? No   Are any medications first time fills? No   Shipment status Delivery complete          Medication Adherence    Support network for adherence: family member, healthcare provider   Other support network: Pharmacy              Follow-up: 28 Days      Clarisse Plascencia, Care Coordinator   Endocrinology  3/21/2023  10:33 EDT

## 2023-03-27 PROCEDURE — 93248 EXT ECG>7D<15D REV&INTERPJ: CPT | Performed by: INTERNAL MEDICINE

## 2023-04-03 ENCOUNTER — TELEPHONE (OUTPATIENT)
Dept: ORTHOPEDIC SURGERY | Facility: CLINIC | Age: 73
End: 2023-04-03
Payer: MEDICARE

## 2023-04-18 ENCOUNTER — DOCUMENTATION (OUTPATIENT)
Dept: ENDOCRINOLOGY | Facility: CLINIC | Age: 73
End: 2023-04-18
Payer: MEDICARE

## 2023-04-18 NOTE — PROGRESS NOTES
Specialty Pharmacy Patient Management Program  Mike Approval     Renata Lynn is a 72 y.o. female with Type 2 Diabetes and Hyperlipidemia seen by an Endocrinology provider and enrolled in the Endocrinology Patient Management program offered by [facility].      Patient approved for Sylantro for hypercholesterolemia mike on 4/18/2023.  NSC Mike Expires 3/18/2024 and covers a total of $2500.  Eligible medications include atorvastatin and Vascepa.      Delroy Cates, PharmD, MPH  Clinical Specialty Pharmacist, Endocrinology  4/18/2023  10:44 EDT

## 2023-04-21 ENCOUNTER — SPECIALTY PHARMACY (OUTPATIENT)
Dept: ENDOCRINOLOGY | Facility: CLINIC | Age: 73
End: 2023-04-21
Payer: MEDICARE

## 2023-04-21 NOTE — PROGRESS NOTES
Specialty Pharmacy Patient Management Program  Endocrinology Refill Outreach      Renata is a 72 y.o. female contacted today regarding refills of her medication(s).    Specialty medication(s) and dose(s) confirmed: Ozempic and Vascepa   Other medication(s) being refilled: Lipitor, Coreg,, Eliquis, HCTZ, Singulair    Requested Refills on 4/21 for:   Atrovent and Valtrex   Requested 90 day refill for Diltiazem     Refill Questions    Flowsheet Row Most Recent Value   Changes to allergies? No   Changes to medications? No   New conditions since last clinic visit No   Unplanned office visit, urgent care, ED, or hospital admission in the last 4 weeks  No   How does patient/caregiver feel medication is working? Very good   Financial problems or insurance changes  No   Since the previous refill, were any specialty medication doses or scheduled injections missed or delayed?  No   Does this patient require a clinical escalation to a pharmacist? No        Delivery Questions    Flowsheet Row Most Recent Value   Delivery method FedEx   Delivery address correct? Yes   Delivery phone number [phone]   Preferred delivery time? Anytime   Number of medications in delivery 7   Medication being filled and delivered Lipitor, Coreg, Eliquis, HCTZ,  Singulair, Ozempic, Vascepa   Is there any medication that is due not being filled? No   Supplies needed? No supplies needed   Cooler needed? Yes   Do any medications need mixed or dated? No   Copay form of payment Credit card on file   Additional comments copay $138.21   Questions or concerns for the pharmacist? No   Are any medications first time fills? No   Shipment status Cooler packed, Delivery complete          Medication Adherence    Support network for adherence: family member, healthcare provider   Other support network: Pharmacy           Follow-Up: 84 Days

## 2023-04-24 ENCOUNTER — SPECIALTY PHARMACY (OUTPATIENT)
Dept: ENDOCRINOLOGY | Facility: CLINIC | Age: 73
End: 2023-04-24
Payer: MEDICARE

## 2023-04-25 ENCOUNTER — OFFICE VISIT (OUTPATIENT)
Dept: ORTHOPEDIC SURGERY | Facility: CLINIC | Age: 73
End: 2023-04-25
Payer: MEDICARE

## 2023-04-25 VITALS
BODY MASS INDEX: 32.14 KG/M2 | HEIGHT: 66 IN | WEIGHT: 200 LBS | DIASTOLIC BLOOD PRESSURE: 84 MMHG | SYSTOLIC BLOOD PRESSURE: 136 MMHG

## 2023-04-25 DIAGNOSIS — M17.12 PRIMARY OSTEOARTHRITIS OF LEFT KNEE: Primary | ICD-10-CM

## 2023-04-25 RX ORDER — LIDOCAINE HYDROCHLORIDE 10 MG/ML
3 INJECTION, SOLUTION EPIDURAL; INFILTRATION; INTRACAUDAL; PERINEURAL
Status: COMPLETED | OUTPATIENT
Start: 2023-04-25 | End: 2023-04-25

## 2023-04-25 RX ORDER — TRIAMCINOLONE ACETONIDE 40 MG/ML
40 INJECTION, SUSPENSION INTRA-ARTICULAR; INTRAMUSCULAR
Status: COMPLETED | OUTPATIENT
Start: 2023-04-25 | End: 2023-04-25

## 2023-04-25 RX ADMIN — LIDOCAINE HYDROCHLORIDE 3 ML: 10 INJECTION, SOLUTION EPIDURAL; INFILTRATION; INTRACAUDAL; PERINEURAL at 14:02

## 2023-04-25 RX ADMIN — TRIAMCINOLONE ACETONIDE 40 MG: 40 INJECTION, SUSPENSION INTRA-ARTICULAR; INTRAMUSCULAR at 14:02

## 2023-04-25 NOTE — PROGRESS NOTES
Procedure   Large Joint Arthrocentesis: L knee  Date/Time: 4/25/2023 2:02 PM  Consent given by: patient  Site marked: site marked  Timeout: Immediately prior to procedure a time out was called to verify the correct patient, procedure, equipment, support staff and site/side marked as required   Supporting Documentation  Indications: pain   Procedure Details  Location: knee - L knee  Preparation: Patient was prepped and draped in the usual sterile fashion  Needle gauge: 21.  Approach: anterolateral  Medications administered: 3 mL lidocaine PF 1% 1 %; 40 mg triamcinolone acetonide 40 MG/ML  Patient tolerance: patient tolerated the procedure well with no immediate complications

## 2023-04-25 NOTE — PROGRESS NOTES
[facility] Note        Subjective     CC: Follow-up (3.5 months- Primary osteoarthritis of left knee)      FANG Lynn is a 72 y.o. female.  Patient is here today for follow-up of her left knee arthritis.  Completed Orthovisc series on 1/3/2023.  She says it is helping but not as much as it has in the past.    Overall, patient's symptoms are as above.    ROS:    Constiutional:Pt denies fever, chills, nausea, or vomiting.  MSK:as above        Objective      Past Medical History  Past Medical History:   Diagnosis Date   • Asthma ?  2022    Listed on hospital release form   • Atrial fibrillation Oct  27, 2023   • Basal cell carcinoma    • Hypertensive disorder    • Mixed hyperlipidemia    • Obesity     body mass index 30+-obesity   • Sleep apnea 2000 ?   • Type 2 diabetes mellitus, uncontrolled      Social History     Socioeconomic History   • Marital status:    Tobacco Use   • Smoking status: Former     Packs/day: 1.00     Years: 20.00     Pack years: 20.00     Types: Cigarettes   • Smokeless tobacco: Never   • Tobacco comments:     Started late teens stopped late 30s   Vaping Use   • Vaping Use: Never used   Substance and Sexual Activity   • Alcohol use: Yes     Comment: occasional   • Drug use: Never   • Sexual activity: Yes     Partners: Male     Birth control/protection: Condom, Spermicide, Injection, Birth control pill          Imaging/Labs/EMG Reviewed:  Imaging Results (Last 24 Hours)     Procedure Component Value Units Date/Time    XR Knee 4+ View Left [[id number]] Resulted: 04/25/23 1410     Updated: 04/25/23 1416    Narrative:      Knee X-Ray    Indication: Pain    Study:  Upright AP, Skiers, Lateral, and Sunrise views of Left knee(s)    Comparison: Left knee 9/23/2021    Findings:    Patient appears to have moderate to early severe degenerative changes in   the medial compartment.    There are mild degenerative changes in the lateral compartment.    There are  moderate changes in the patellofemoral compartment.    Patient has overall varus alignment.    Kellgren-Silvino thGthrthathdtheth:th th4th Impression:   Moderate early severe medial compartment and moderate patellofemoral   compartment degnerative changes of the knee               Assessment    Assessment:  1. Primary osteoarthritis of left knee        Plan:  1. Recommend over the counter anti-inflammatories for pain and/or swelling  2. Left knee arthritis--steroid injection will be given today.  Follow-up in 3 months we will plan on repeating the viscosupplementation which will be ordered today.    Procedure Note:    I discussed with the patient the potential benefits of performing a therapeutic injections as well as potential risks including but not limited to infection, swelling, pain, bleeding, bruising, nerve/vessel damage, skin color changes, transient elevation in blood glucose levels, and fat atrophy. After informed consent and after the areas were prepped with chlorhexadine soap, ethyl chloride was used to numb the skin. Via the superior lateral approach, 3mL of 1% lidocaine followed by 40mg of Kenalog were each injected into the left knee joint. The patient tolerated the procedure well. There were no complications. A sterile dressing was placed over the injection sites.        Mateo Burris MD  04/25/23  14:17 EDT      Dictated Utilizing Dragon Dictation.

## 2023-05-01 ENCOUNTER — SPECIALTY PHARMACY (OUTPATIENT)
Dept: ENDOCRINOLOGY | Facility: CLINIC | Age: 73
End: 2023-05-01
Payer: MEDICARE

## 2023-05-15 DIAGNOSIS — I48.0 PAROXYSMAL ATRIAL FIBRILLATION: ICD-10-CM

## 2023-05-15 RX ORDER — DILTIAZEM HYDROCHLORIDE 120 MG/1
120 CAPSULE, EXTENDED RELEASE ORAL DAILY
Qty: 90 CAPSULE | Refills: 1 | Status: SHIPPED | OUTPATIENT
Start: 2023-05-15

## 2023-05-15 NOTE — TELEPHONE ENCOUNTER
Last seen on 2/23/23; sent refill for Dilt-XR 120mg daily to [facility].     Mayi Muller, PharmD

## 2023-05-16 DIAGNOSIS — I10 PRIMARY HYPERTENSION: ICD-10-CM

## 2023-05-16 RX ORDER — IPRATROPIUM BROMIDE 42 UG/1
1 SPRAY, METERED NASAL 3 TIMES DAILY
Qty: 15 ML | Refills: 3 | Status: CANCELLED | OUTPATIENT
Start: 2023-05-16

## 2023-05-16 NOTE — TELEPHONE ENCOUNTER
Request sent in error to Cardiology.  Will re-send to patient's PCP, please disregard.       Lucia Cid PharmD, BCACP  Clinical Specialty Pharmacist  5/16/2023  16:03 EDT

## 2023-05-17 RX ORDER — VALSARTAN 320 MG/1
320 TABLET ORAL DAILY
Qty: 90 TABLET | Refills: 2 | Status: SHIPPED | OUTPATIENT
Start: 2023-05-17

## 2023-05-18 ENCOUNTER — SPECIALTY PHARMACY (OUTPATIENT)
Dept: ENDOCRINOLOGY | Facility: CLINIC | Age: 73
End: 2023-05-18
Payer: MEDICARE

## 2023-05-18 DIAGNOSIS — I48.0 PAF (PAROXYSMAL ATRIAL FIBRILLATION): ICD-10-CM

## 2023-05-18 DIAGNOSIS — I10 PRIMARY HYPERTENSION: ICD-10-CM

## 2023-05-18 RX ORDER — VALSARTAN 320 MG/1
320 TABLET ORAL DAILY
Qty: 90 TABLET | Refills: 0 | OUTPATIENT
Start: 2023-05-18

## 2023-05-18 RX ORDER — APIXABAN 5 MG/1
5 TABLET, FILM COATED ORAL EVERY 12 HOURS SCHEDULED
Qty: 180 TABLET | Refills: 1 | Status: SHIPPED | OUTPATIENT
Start: 2023-05-18

## 2023-05-18 NOTE — PROGRESS NOTES
Specialty Pharmacy Patient Management Program  Endocrinology Refill Outreach      Renata is a 72 y.o. female contacted today regarding refills of her medication(s).    Specialty medication(s) and dose(s) confirmed: na  Other medications being refilled: Test Strips/Lancets, Valsartan, Diltiazem     Refill Questions    Flowsheet Row Most Recent Value   Changes to allergies? No   Changes to medications? No   New conditions since last clinic visit No   Unplanned office visit, urgent care, ED, or hospital admission in the last 4 weeks  No   How does patient/caregiver feel medication is working? Very good   Financial problems or insurance changes  No   Since the previous refill, were any specialty medication doses or scheduled injections missed or delayed?  No   Does this patient require a clinical escalation to a pharmacist? No          Delivery Questions    Flowsheet Row Most Recent Value   Delivery method FedEx   Delivery address correct? Yes   Delivery phone number [phone]   Preferred delivery time? Anytime   Number of medications in delivery 4   Medication being filled and delivered Diltiazem, Valsartan, Test Strips, Lancets   Is there any medication that is due not being filled? No   Supplies needed? No supplies needed   Cooler needed? No   Do any medications need mixed or dated? No   Copay form of payment Credit card on file   Additional comments Copay TBD   Questions or concerns for the pharmacist? No   Are any medications first time fills? No   Shipment status Delivery complete          Medication Adherence    Support network for adherence: family member, healthcare provider   Other support network: Pharmacy              Follow-up: 90 Days      Clarisse Plascencia, Care Coordinator   Endocrinology  5/18/2023  15:28 EDT

## 2023-05-18 NOTE — TELEPHONE ENCOUNTER
Last seen on 2/23/23; sent refill for Eliquis 5mg twice daily to [facility].      Mayi Muller, PharmD

## 2023-05-19 ENCOUNTER — SPECIALTY PHARMACY (OUTPATIENT)
Dept: ENDOCRINOLOGY | Facility: CLINIC | Age: 73
End: 2023-05-19
Payer: MEDICARE

## 2023-05-19 NOTE — PROGRESS NOTES
Specialty Pharmacy Patient Management Program  Endocrinology Refill Outreach      Renata is a 72 y.o. female contacted today regarding refills of her medication(s).    Specialty medication(s) and dose(s) confirmed: na  Other medications being refilled: Valtrex    Refill Questions    Flowsheet Row Most Recent Value   Changes to allergies? No   Changes to medications? No   New conditions since last clinic visit No   Unplanned office visit, urgent care, ED, or hospital admission in the last 4 weeks  No   How does patient/caregiver feel medication is working? Very good   Financial problems or insurance changes  No   Since the previous refill, were any specialty medication doses or scheduled injections missed or delayed?  No   Does this patient require a clinical escalation to a pharmacist? No          Delivery Questions    Flowsheet Row Most Recent Value   Delivery method FedEx   Delivery address correct? Yes   Delivery phone number [phone]   Preferred delivery time? Anytime   Number of medications in delivery 1   Medication being filled and delivered Valtrex   Is there any medication that is due not being filled? No   Supplies needed? No supplies needed   Cooler needed? No   Do any medications need mixed or dated? No   Copay form of payment Credit card on file   Additional comments Copay $10   Questions or concerns for the pharmacist? No   Are any medications first time fills? No   Shipment status Delivery complete          Medication Adherence    Support network for adherence: family member, healthcare provider   Other support network: Pharmacy              Follow-up: 30 Days      Clarisse Plascencia, Care Coordinator   Endocrinology  5/19/2023  08:53 EDT

## 2023-05-20 DIAGNOSIS — I48.0 PAF (PAROXYSMAL ATRIAL FIBRILLATION): ICD-10-CM

## 2023-05-22 RX ORDER — APIXABAN 5 MG/1
5 TABLET, FILM COATED ORAL EVERY 12 HOURS SCHEDULED
Qty: 180 TABLET | Refills: 2 | OUTPATIENT
Start: 2023-05-22

## 2023-06-19 ENCOUNTER — SPECIALTY PHARMACY (OUTPATIENT)
Dept: ENDOCRINOLOGY | Facility: CLINIC | Age: 73
End: 2023-06-19
Payer: MEDICARE

## 2023-06-19 NOTE — PROGRESS NOTES
Specialty Pharmacy Patient Management Program  Endocrinology Reassessment     Renata Lynn is a 72 y.o. female seen by an Endocrinology provider for Type 2 Diabetes and Hyperlipidemia and enrolled in the Endocrinology Patient Management program offered by [facility].  A follow-up outreach was conducted, including assessment of continued therapy appropriateness, medication adherence, and side effect incidence and management for Ozempic and Vascepa.    Changes to Insurance Coverage or Financial Support  No changes at this time    Relevant Past Medical History and Comorbidities  Relevant medical history and concomitant health conditions were discussed with the patient. The patient's chart has been reviewed for relevant past medical history and comorbid health conditions and updated as necessary.   Past Medical History:   Diagnosis Date    Asthma ?  2022    Listed on hospital release form    Atrial fibrillation Oct  27, 2023    Basal cell carcinoma     Hypertensive disorder     Mixed hyperlipidemia     Obesity     body mass index 30+-obesity    Sleep apnea 2000 ?    Type 2 diabetes mellitus, uncontrolled      Social History     Socioeconomic History    Marital status:    Tobacco Use    Smoking status: Former     Packs/day: 1.00     Years: 20.00     Pack years: 20.00     Types: Cigarettes    Smokeless tobacco: Never    Tobacco comments:     Started late teens stopped late 30s   Vaping Use    Vaping Use: Never used   Substance and Sexual Activity    Alcohol use: Yes     Comment: occasional    Drug use: Never    Sexual activity: Yes     Partners: Male     Birth control/protection: Condom, Spermicide, Injection, Birth control pill     Problem list reviewed by Delroy Cates, PharmD on 6/19/2023 at  5:32 PM    Hospitalizations and Urgent Care Since Last Assessment  ED Visits, Admissions, or Hospitalizations: None  Urgent Office Visits: None    Allergies  Known allergies and reactions were  discussed with the patient. The patient's chart has been reviewed for allergy information and updated as necessary.   Allergies   Allergen Reactions    Penicillins Swelling     Allergies reviewed by Delroy Cates, PharmD on 6/19/2023 at  5:32 PM    Relevant Laboratory Values  A1C Last 3 Results          12/30/2022    11:45   HGBA1C Last 3 Results   Hemoglobin A1C 7.8      Lab Results   Component Value Date    HGBA1C 7.8 12/30/2022     Lab Results   Component Value Date    GLUCOSE 375 (H) 09/27/2022    CALCIUM 10.0 09/27/2022     (L) 09/27/2022    K 4.6 09/27/2022    CO2 25.0 09/27/2022    CL 98 09/27/2022    BUN 16 09/27/2022    CREATININE 0.75 09/27/2022    EGFRIFAFRI 94 08/04/2021    EGFRIFNONA 78 08/04/2021    BCR 21.3 09/27/2022    ANIONGAP 12.0 09/27/2022     Lab Results   Component Value Date    CHLPL 170 08/04/2021    TRIG 471 (H) 08/04/2021    HDL 34 (L) 08/04/2021    LDL 64 08/04/2021       Current Medication List  This medication list has been reviewed with the patient and evaluated for any interactions or necessary modifications/recommendations, and updated to include all prescription medications, OTC medications, and supplements the patient is currently taking.  This list reflects what is contained in the patient's profile, which has also been marked as reviewed to communicate to other providers it is the most up to date version of the patient's current medication therapy.     Current Outpatient Medications:     Accu-Chek FastClix Lancets misc, Test blood glucose daily and as needed., Disp: 300 each, Rfl: 3    apixaban (Eliquis) 5 MG tablet tablet, Take 1 tablet by mouth Every 12 (Twelve) Hours., Disp: 180 tablet, Rfl: 1    atorvastatin (LIPITOR) 10 MG tablet, Take 1 tablet by mouth every night at bedtime., Disp: 90 tablet, Rfl: 2    B Complex Vitamins (VITAMIN B COMPLEX PO), Take  by mouth Daily., Disp: , Rfl:     calcium carbonate (OS-SHANICE) 600 MG tablet, Take 2 tablets by mouth Daily., Disp: ,  Rfl:     carvedilol (COREG) 25 MG tablet, Take 1 tablet by mouth 2 (Two) Times a Day., Disp: 180 tablet, Rfl: 3    dilTIAZem XR (Dilt-XR) 120 MG 24 hr capsule, Take 1 capsule by mouth Daily., Disp: 90 capsule, Rfl: 1    esomeprazole (nexIUM) 20 MG capsule, Take 1 capsule by mouth Every Morning Before Breakfast., Disp: , Rfl:     glucosamine-chondroitin 500-400 MG capsule capsule, Take  by mouth 2 (Two) Times a Day., Disp: , Rfl:     glucose blood (Accu-Chek Guide) test strip, Test blood glucose daily and as needed., Disp: 300 each, Rfl: 3    hydroCHLOROthiazide (HYDRODIURIL) 25 MG tablet, Take 1 tablet by mouth Daily., Disp: 90 tablet, Rfl: 2    ipratropium (ATROVENT) 0.06 % nasal spray, Instill 1 spray into the nostrils as directed 3 times daily, Disp: 15 mL, Rfl: 3    Lacrisert (LACRISERT) 5 MG insert, Insert under each eyelid daily, Disp: 180 each, Rfl: 3    metFORMIN (GLUCOPHAGE) 1000 MG tablet, Take 1 tablet by mouth 2 (Two) Times a Day With Meals., Disp: 180 tablet, Rfl: 3    montelukast (SINGULAIR) 10 MG tablet, Take 1 tablet by mouth Daily., Disp: 90 tablet, Rfl: 2    multivitamin with minerals tablet tablet, Take 1 tablet by mouth Daily., Disp: , Rfl:     Semaglutide, 1 MG/DOSE, (Ozempic, 1 MG/DOSE,) 4 MG/3ML solution pen-injector, Inject 1 mg under the skin into the appropriate area as directed Every 7 (Seven) Days., Disp: 9 mL, Rfl: 3    valACYclovir (VALTREX) 1000 MG tablet, Take 1 tablet by mouth Daily for 3 days as Needed., Disp: 30 tablet, Rfl: 0    valsartan (DIOVAN) 320 MG tablet, Take 1 tablet by mouth Daily., Disp: 90 tablet, Rfl: 2    Vascepa 1 g capsule capsule, Take 2 capsules (2 grams) by mouth 2 (Two) Times a Day., Disp: 360 capsule, Rfl: 3    vitamin D3 125 MCG (5000 UT) capsule capsule, Take 1 capsule by mouth Daily., Disp: , Rfl:     vitamin E 100 UNIT capsule, Take 2 capsules by mouth Daily., Disp: , Rfl:     Zinc Sulfate (ZINC-220 PO), Take 1 tablet by mouth Daily., Disp: , Rfl:      Medicines reviewed by Delroy Cates, PharmD on 6/19/2023 at  5:32 PM    Drug Interactions  No Clinically Significant DDIs Were Identified at Present Time Upon Marking Medications Reviewed    Recommended Medications Assessment  Aspirin: Not Taking Currently  Statin: Currently Taking   ACEi/ARB: Currently Taking     Adverse Drug Reactions  Medication tolerability: Tolerating with no to minimal ADRs  Medication plan: Continue therapy with normal follow-up  Plan for ADR Management: N/A at this time.  Pt reports to be tolerating both medications very well.    Adherence, Self-Administration, and Current Therapy Problems  Adherence related to the patient's specialty therapy was discussed with the patient. The Adherence segment of this outreach has been reviewed and updated.     Adherence Questions  Medication(s) assessed: Ozempic, Vascepa  On average, how many doses/injections does the patient miss per month?: 0  What are the identified reasons for non-adherence or missed doses? : no problems identfied  What is the estimated medication adherence level?: %  Based on the patient/caregiver response and refill history, does this patient require an MTP to track adherence improvements?: no    Additional Barriers to Patient Self-Administration: No known barriers at this time.  Methods for Supporting Patient Self-Administration: Continue current measures.     Medication Therapy Recommendations  No medication therapy recommendations to display    Goals of Therapy  Goals related to the patient's specialty therapy were discussed with the patient. The Patient Goals segment of this outreach has been reviewed and updated.   Goals        Therapeutic/Clinical Target      HbA1c <7%     3/16/22: No new values since last appointment on 12/17/21 (7.1%). Patient has since been working on diet/exercise and reports being fully adherent to regimen - marking today's progress toward goal on track until next labs, which will be compared  to goal on next reassessment. KL     9/2/22: No new values since last appointment on 12/17/21 (7.1%). Patient continues to work on diet/exercise and reports adherence. Patient had to cancel most recent scheduled appointment on 8/22/22. Next appointment in December. Patient will have new value at that time. Rescheduling next assessment early to align with this appointment.  KL     11/8/22: 8.3% Labs Obtained Outside System    12/30/22: 7.8%  Patient A1c increased to 8.3 then came back down to 7.8 since last visit, but overall patient has regressed. Provider increasing Ozempic today to 1mg. Will continue to evaluate on reassessment and take further action if goal continues to be not met after this medication change. KL    6/19/23: No new values since last appt on 12/30/22.  Pt reports no missed doses and PDC both >0.95.  Will eli on track at this time.  CR       Therapeutic/Clinical Target      TGs Trending Downward (Goal: <150)     3/16/22: No new values since last labs 08/04//21 (471). Patient has since been working on diet/exercise and reports being fully adherent to regimen - marking today's progress toward goal on track until next labs, which will be compared to goal on next reassessment. KL     9/2/22: No new values since last labs 08/04//21 (471). Patient has since been working on diet/exercise and reports being fully adherent to regimen - marking today's progress toward goal on track until next labs, which will be compared to goal on next reassessment. Patient canceled appt on 8/22/22, next appt in December 2022. Aligning next refill to that appointment to reassess labs and progress toward goal at that time. KL     12/30/22: Patient still reports full adherence. Most recent labs from 11/8/22 show TG at 343, so somewhat improved. Will continue current regimen. Total cholesterol is normal and provider increased Trulicity today for diabetes, TG likely secondary to uncontrolled glucose. Will continue to monitor.  KL     6/19/23: No new values since last appt on 12/30/22.  Pt reports no missed doses and PDC both >0.95.  Will eli on track at this time.  CR              Quality of Life Assessment   Quality of Life related to the patient's enrollment in the patient management program and services provided was discussed with the patient. The QOL segment of this outreach has been reviewed and updated.  Quality of Life Improvement Scale: A good deal better    Reassessment Plan & Follow-Up  1. Medication Therapy Changes: Phone Reassessment - Will f/u during next appt.  Of note, pt currently in [place] but plans to schedule when she returns to [place] in the coming weeks.  2. Related Plans, Therapy Recommendations, or Issues to Be Addressed: None  3. Pharmacist to perform regular assessments no more than (6) months from the previous assessment.  4. Care Coordinator to set up future refill outreaches, coordinate prescription delivery, and escalate clinical questions to pharmacist.      Attestation  Therapeutic appropriateness: Appropriate   If the prescribed therapy is at any point deemed not appropriate based on the current or future assessments, a consultation will be initiated with the patient's specialty care provider to determine the best course of action. The revised plan of therapy will be documented along with any required assessments and/or additional patient education provided.     Delroy Cates, Karen, MPH  Clinical Specialty Pharmacist, Endocrinology  6/19/2023  17:38 EDT

## 2023-08-09 NOTE — PROGRESS NOTES
Procedure Note:    I discussed with the patient the potential benefits of performing a therapeutic injections as well as potential risks including but not limited to infection, swelling, pain, bleeding, bruising, nerve/vessel damage, skin color changes, transient elevation in blood glucose levels, and fat atrophy. After informed consent and after the areas were prepped with chlorhexadine soap, ethyl chloride was used to numb the skin. Via the superiorlateral approach, 3mL of 1% lidocaine followed by Orthovisc No. 1 were each injected into the left knee joint. The patient tolerated the procedure well. There were no complications. A sterile dressing was placed over the injection sites.        Patient initially said that she was ready to schedule surgery but wanted to get this done around basketball season so we will need to push any discussions into the spring.

## 2023-08-10 ENCOUNTER — CLINICAL SUPPORT (OUTPATIENT)
Dept: ORTHOPEDIC SURGERY | Facility: CLINIC | Age: 73
End: 2023-08-10
Payer: MEDICARE

## 2023-08-10 DIAGNOSIS — M17.12 PRIMARY OSTEOARTHRITIS OF LEFT KNEE: Primary | ICD-10-CM

## 2023-08-10 RX ORDER — LIDOCAINE HYDROCHLORIDE 10 MG/ML
3 INJECTION, SOLUTION EPIDURAL; INFILTRATION; INTRACAUDAL; PERINEURAL
Status: COMPLETED | OUTPATIENT
Start: 2023-08-10 | End: 2023-08-10

## 2023-08-10 RX ADMIN — LIDOCAINE HYDROCHLORIDE 3 ML: 10 INJECTION, SOLUTION EPIDURAL; INFILTRATION; INTRACAUDAL; PERINEURAL at 09:10

## 2023-08-10 NOTE — PROGRESS NOTES
Procedure   Large Joint Arthrocentesis: L knee  Date/Time: 8/10/2023 9:10 AM  Consent given by: patient  Site marked: site marked  Timeout: Immediately prior to procedure a time out was called to verify the correct patient, procedure, equipment, support staff and site/side marked as required   Supporting Documentation  Indications: pain   Procedure Details  Location: knee - L knee  Needle gauge: 21g.  Approach: anterolateral  Medications administered: 3 mL lidocaine PF 1% 1 %; 30 mg Hyaluronan 30 MG/2ML  Patient tolerance: patient tolerated the procedure well with no immediate complications

## 2023-08-15 ENCOUNTER — SPECIALTY PHARMACY (OUTPATIENT)
Dept: ENDOCRINOLOGY | Facility: CLINIC | Age: 73
End: 2023-08-15
Payer: MEDICARE

## 2023-08-15 NOTE — PROGRESS NOTES
Specialty Pharmacy Patient Management Program  Endocrinology Refill Outreach      Renata is a 72 y.o. female contacted today regarding refills of her medication(s).    Specialty medication(s) and dose(s) confirmed: na  Other medications being refilled: Flecainide, Test Strips, Lancets, Valsartan, Diltiazem     Refill Questions      Flowsheet Row Most Recent Value   Changes to allergies? No   Changes to medications? No   New conditions since last clinic visit No   Unplanned office visit, urgent care, ED, or hospital admission in the last 4 weeks  No   How does patient/caregiver feel medication is working? Very good   Financial problems or insurance changes  No   Since the previous refill, were any specialty medication doses or scheduled injections missed or delayed?  No   Does this patient require a clinical escalation to a pharmacist? No            Delivery Questions      Flowsheet Row Most Recent Value   Delivery method FedEx   Delivery address correct? Yes   Delivery phone number [phone]   Preferred delivery time? Anytime   Number of medications in delivery 5   Medication being filled and delivered Dilt-Xr, Test Strips, Lancets, Valsartan, Flecainide   Is there any medication that is due not being filled? No   Supplies needed? No supplies needed   Cooler needed? No   Do any medications need mixed or dated? No   Copay form of payment Credit card on file   Additional comments Copay $12.87   Questions or concerns for the pharmacist? No   Are any medications first time fills? No   Shipment status Delivery complete            Medication Adherence    Support network for adherence: family member, healthcare provider   Other support network: Pharmacy                Follow-up: 90 Days      Clarisse Plascencia, Care Coordinator   Endocrinology  8/15/2023  16:25 EDT

## 2023-08-16 NOTE — PROGRESS NOTES
Procedure Note:    I discussed with the patient the potential benefits of performing a therapeutic injections as well as potential risks including but not limited to infection, swelling, pain, bleeding, bruising, nerve/vessel damage, skin color changes, transient elevation in blood glucose levels, and fat atrophy. After informed consent and after the areas were prepped with chlorhexadine soap, ethyl chloride was used to numb the skin. Via the superiorlateral approach, 3mL of 1% lidocaine followed by Orthovisc No. 2 were each injected into the left knee joint. The patient tolerated the procedure well. There were no complications. A sterile dressing was placed over the injection sites.      Follow-up: 1 week

## 2023-08-17 ENCOUNTER — CLINICAL SUPPORT (OUTPATIENT)
Dept: ORTHOPEDIC SURGERY | Facility: CLINIC | Age: 73
End: 2023-08-17
Payer: MEDICARE

## 2023-08-17 VITALS
HEIGHT: 66 IN | WEIGHT: 202.3 LBS | SYSTOLIC BLOOD PRESSURE: 140 MMHG | DIASTOLIC BLOOD PRESSURE: 68 MMHG | BODY MASS INDEX: 32.51 KG/M2

## 2023-08-17 DIAGNOSIS — M17.12 PRIMARY OSTEOARTHRITIS OF LEFT KNEE: Primary | ICD-10-CM

## 2023-08-17 RX ORDER — BENZONATATE 200 MG/1
200 CAPSULE ORAL
COMMUNITY
Start: 2023-08-14

## 2023-08-17 RX ORDER — ALBUTEROL SULFATE 90 UG/1
2 AEROSOL, METERED RESPIRATORY (INHALATION)
COMMUNITY
Start: 2023-08-14

## 2023-08-17 RX ORDER — METHYLPREDNISOLONE 4 MG/1
TABLET ORAL DAILY
COMMUNITY
Start: 2023-08-14

## 2023-08-17 RX ORDER — LIDOCAINE HYDROCHLORIDE 10 MG/ML
3 INJECTION, SOLUTION EPIDURAL; INFILTRATION; INTRACAUDAL; PERINEURAL
Status: COMPLETED | OUTPATIENT
Start: 2023-08-17 | End: 2023-08-17

## 2023-08-17 RX ADMIN — LIDOCAINE HYDROCHLORIDE 3 ML: 10 INJECTION, SOLUTION EPIDURAL; INFILTRATION; INTRACAUDAL; PERINEURAL at 09:16

## 2023-08-17 NOTE — PROGRESS NOTES
Procedure   - Large Joint Arthrocentesis: L knee on 8/17/2023 9:16 AM  Indications: pain  Details: 21 G needle, anterolateral approach  Medications: 30 mg Hyaluronan 30 MG/2ML; 3 mL lidocaine PF 1% 1 %  Outcome: tolerated well, no immediate complications  Procedure, treatment alternatives, risks and benefits explained, specific risks discussed. Consent was given by the patient. Immediately prior to procedure a time out was called to verify the correct patient, procedure, equipment, support staff and site/side marked as required. Patient was prepped and draped in the usual sterile fashion.

## 2023-08-24 ENCOUNTER — CLINICAL SUPPORT (OUTPATIENT)
Dept: ORTHOPEDIC SURGERY | Facility: CLINIC | Age: 73
End: 2023-08-24
Payer: MEDICARE

## 2023-08-24 DIAGNOSIS — M17.12 PRIMARY OSTEOARTHRITIS OF LEFT KNEE: Primary | ICD-10-CM

## 2023-08-24 RX ORDER — LIDOCAINE HYDROCHLORIDE 10 MG/ML
3 INJECTION, SOLUTION EPIDURAL; INFILTRATION; INTRACAUDAL; PERINEURAL
Status: COMPLETED | OUTPATIENT
Start: 2023-08-24 | End: 2023-08-24

## 2023-08-24 RX ADMIN — LIDOCAINE HYDROCHLORIDE 3 ML: 10 INJECTION, SOLUTION EPIDURAL; INFILTRATION; INTRACAUDAL; PERINEURAL at 09:03

## 2023-08-24 NOTE — PROGRESS NOTES
Procedure Note:    I discussed with the patient the potential benefits of performing a therapeutic injections as well as potential risks including but not limited to infection, swelling, pain, bleeding, bruising, nerve/vessel damage, skin color changes, transient elevation in blood glucose levels, and fat atrophy. After informed consent and after the areas were prepped with chlorhexadine soap, ethyl chloride was used to numb the skin. Via the superiorlateral approach, 3mL of 1% lidocaine followed by Orthovisc No. 3 were each injected into the left knee joint. The patient tolerated the procedure well. There were no complications. A sterile dressing was placed over the injection sites.      Follow-up: 3 months

## 2023-08-24 NOTE — PROGRESS NOTES
Procedure   - Large Joint Arthrocentesis: L knee on 8/24/2023 9:03 AM  Indications: pain  Details: 21 G needle, anterolateral approach  Medications: 30 mg Hyaluronan 30 MG/2ML; 3 mL lidocaine PF 1% 1 %  Outcome: tolerated well, no immediate complications  Procedure, treatment alternatives, risks and benefits explained, specific risks discussed. Consent was given by the patient. Immediately prior to procedure a time out was called to verify the correct patient, procedure, equipment, support staff and site/side marked as required. Patient was prepped and draped in the usual sterile fashion.

## 2023-09-29 DIAGNOSIS — I10 PRIMARY HYPERTENSION: ICD-10-CM

## 2023-09-29 RX ORDER — CARVEDILOL 25 MG/1
25 TABLET ORAL 2 TIMES DAILY
Qty: 180 TABLET | Refills: 3 | OUTPATIENT
Start: 2023-09-29

## 2023-09-29 NOTE — TELEPHONE ENCOUNTER
No follow up schedule in [facility]. Will route to [facility] for review.

## 2023-10-02 ENCOUNTER — SPECIALTY PHARMACY (OUTPATIENT)
Dept: ENDOCRINOLOGY | Facility: CLINIC | Age: 73
End: 2023-10-02
Payer: MEDICARE

## 2023-10-02 DIAGNOSIS — I10 PRIMARY HYPERTENSION: ICD-10-CM

## 2023-10-02 RX ORDER — CARVEDILOL 25 MG/1
25 TABLET ORAL 2 TIMES DAILY
Qty: 180 TABLET | Refills: 0 | Status: SHIPPED | OUTPATIENT
Start: 2023-10-02

## 2023-10-02 NOTE — PROGRESS NOTES
=   Specialty Pharmacy Patient Management Program  Endocrinology Refill Outreach      Renata is a 72 y.o. female contacted today regarding refills of her medication(s).    Specialty medication(s) and dose(s) confirmed: Ozempic, Vasecpa  Other medications being refilled: Atorvastatin, Carvedilol, Eliquis, Hctz, Metformin, Montelukast    Refill Questions      Flowsheet Row Most Recent Value   Changes to allergies? No   Changes to medications? No   New conditions since last clinic visit No   Unplanned office visit, urgent care, ED, or hospital admission in the last 4 weeks  No   How does patient/caregiver feel medication is working? Very good   Financial problems or insurance changes  No   Since the previous refill, were any specialty medication doses or scheduled injections missed or delayed?  No   Does this patient require a clinical escalation to a pharmacist? No            Delivery Questions      Flowsheet Row Most Recent Value   Delivery method FedEx   Delivery address correct? Yes   Delivery phone number [phone]   Preferred delivery time? Anytime   Number of medications in delivery 8   Medication being filled and delivered Atorvastatin, Carvedilol, Eliquis, Hctz, Metformin, Montelukast, Ozempic, Vascepa   Is there any medication that is due not being filled? No   Supplies needed? No supplies needed   Cooler needed? Yes   Do any medications need mixed or dated? No   Copay form of payment Credit card on file   Additional comments Copay $254.58   Questions or concerns for the pharmacist? No   Are any medications first time fills? No   Shipment status Cooler packed, Delivery complete            Medication Adherence    Support network for adherence: family member, healthcare provider   Other support network: Pharmacy                Follow-up: 84 Days, Ozempic, 90 Days on Others      Clarisse Plascencia, Care Coordinator   Endocrinology  10/2/2023  14:36 EDT

## 2023-10-02 NOTE — TELEPHONE ENCOUNTER
Specialty Pharmacy Patient Management Program  Prescription Refill Request     Patient currently fills medications at  Pharmacy. Needing refill(s) on the following:      Requested Prescriptions     Pending Prescriptions Disp Refills    metFORMIN (GLUCOPHAGE) 1000 MG tablet 180 tablet 3     Sig: Take 1 tablet by mouth 2 (Two) Times a Day With Meals.     Pended for endocrinology provider, Dr. Whipple, to review and approve if appropriate.     Lindsey SteinerD, BCACP  Clinical Specialty Pharmacist, Endocrinology  10/2/2023  11:45 EDT

## 2023-11-08 ENCOUNTER — TELEPHONE (OUTPATIENT)
Dept: CARDIOLOGY | Facility: CLINIC | Age: 73
End: 2023-11-08
Payer: MEDICARE

## 2023-11-08 NOTE — TELEPHONE ENCOUNTER
Caller: Renata Lynn    Relationship: Self    Best call back number: [phone] (home)      What is the best time to reach you: ANY    Who are you requesting to speak with (clinical staff, provider,  specific staff member): CLINICAL      What was the call regarding: PATIENT STATES SHE IS SCHEDULED FOR A COLONOSCOPY ON 11-13-23 AND HER DR IS REQUESTING THAT SHE STOP HER ELIQUIS 5 DAYS PRIOR. PLEASE CONTACT PATIENT IN REGARDS TO THIS ISSUE.    Is it okay if the provider responds through [employer]: PLEASE CALL

## 2023-11-09 DIAGNOSIS — I48.0 PAROXYSMAL ATRIAL FIBRILLATION: ICD-10-CM

## 2023-11-09 RX ORDER — DILTIAZEM HYDROCHLORIDE 120 MG/1
120 CAPSULE, EXTENDED RELEASE ORAL DAILY
Qty: 90 CAPSULE | Refills: 1 | OUTPATIENT
Start: 2023-11-09

## 2023-11-09 RX ORDER — DILTIAZEM HYDROCHLORIDE 120 MG/1
120 CAPSULE, EXTENDED RELEASE ORAL DAILY
Qty: 90 CAPSULE | Refills: 0 | Status: SHIPPED | OUTPATIENT
Start: 2023-11-09

## 2023-11-09 NOTE — TELEPHONE ENCOUNTER
No f/u scheduled in [facility].  Will route refill request to [facility] for review and management.

## 2023-11-13 ENCOUNTER — AMBULATORY SURGICAL CENTER (OUTPATIENT)
Dept: URBAN - METROPOLITAN AREA SURGERY 10 | Facility: SURGERY | Age: 73
End: 2023-11-13
Payer: MEDICARE

## 2023-11-13 ENCOUNTER — SPECIALTY PHARMACY (OUTPATIENT)
Dept: ENDOCRINOLOGY | Facility: CLINIC | Age: 73
End: 2023-11-13
Payer: MEDICARE

## 2023-11-13 ENCOUNTER — OFFICE (OUTPATIENT)
Dept: URBAN - METROPOLITAN AREA PATHOLOGY 4 | Facility: PATHOLOGY | Age: 73
End: 2023-11-13
Payer: MEDICARE

## 2023-11-13 DIAGNOSIS — Z09 ENCOUNTER FOR FOLLOW-UP EXAMINATION AFTER COMPLETED TREATMEN: ICD-10-CM

## 2023-11-13 DIAGNOSIS — Z86.010 PERSONAL HISTORY OF COLONIC POLYPS: ICD-10-CM

## 2023-11-13 DIAGNOSIS — D12.3 BENIGN NEOPLASM OF TRANSVERSE COLON: ICD-10-CM

## 2023-11-13 DIAGNOSIS — K64.0 FIRST DEGREE HEMORRHOIDS: ICD-10-CM

## 2023-11-13 PROCEDURE — 45385 COLONOSCOPY W/LESION REMOVAL: CPT | Mod: PT | Performed by: INTERNAL MEDICINE

## 2023-11-13 PROCEDURE — 88305 TISSUE EXAM BY PATHOLOGIST: CPT | Performed by: INTERNAL MEDICINE

## 2023-11-13 NOTE — PROGRESS NOTES
Specialty Pharmacy Patient Management Program  Endocrinology Refill Outreach      Renata is a 72 y.o. female contacted today regarding refills of her medication(s).    Specialty medication(s) and dose(s) confirmed: na  Other medications being refilled: Valsartan, Diltiazem    Refill Questions      Flowsheet Row Most Recent Value   Changes to allergies? No   Changes to medications? No   New conditions since last clinic visit No   Unplanned office visit, urgent care, ED, or hospital admission in the last 4 weeks  No   How does patient/caregiver feel medication is working? Very good   Financial problems or insurance changes  No   Since the previous refill, were any specialty medication doses or scheduled injections missed or delayed?  No   Does this patient require a clinical escalation to a pharmacist? No            Delivery Questions      Flowsheet Row Most Recent Value   Delivery method FedEx   Delivery address correct? Yes   Delivery phone number [phone]   Preferred delivery time? Anytime   Number of medications in delivery 2   Medication(s) being filled and delivered Diltiazem Hcl (Calcium Channel Blockers), Valsartan   Is there any medication that is due not being filled? No   Supplies needed? No supplies needed   Cooler needed? No   Do any medications need mixed or dated? No   Copay form of payment Credit card on file   Additional comments Copay $8.30   Questions or concerns for the pharmacist? No   Are any medications first time fills? No   Shipment status Delivery complete            Medication Adherence                Support network for adherence: family member, healthcare provider   Other support network: Pharmacy                Follow-up: 90 Days      Clarisse Plascencia, Care Coordinator   Endocrinology  11/13/2023  12:40 EST

## 2023-11-14 PROBLEM — Z86.010 PERSONAL HISTORY OF COLON POLYPS: Status: ACTIVE | Noted: 2023-11-14

## 2023-12-04 ENCOUNTER — SPECIALTY PHARMACY (OUTPATIENT)
Dept: ENDOCRINOLOGY | Facility: CLINIC | Age: 73
End: 2023-12-04
Payer: MEDICARE

## 2023-12-04 NOTE — PROGRESS NOTES
Specialty Pharmacy Patient Management Program  Endocrinology Reassessment     Renata Lynn was referred by an Endocrinology provider to the Endocrinology Patient Management program offered by [facility] for Type 2 Diabetes and Hyperlipidemia. A follow-up outreach was conducted, including assessment of continued therapy appropriateness, medication adherence, and side effect incidence and management for Ozempic and Vascepa.    Changes to Insurance Coverage or Financial Support  None    Relevant Past Medical History and Comorbidities  Relevant medical history and concomitant health conditions were discussed with the patient. The patient's chart has been reviewed for relevant past medical history and comorbid health conditions and updated as necessary.   Past Medical History:   Diagnosis Date    Asthma ?  2022    Listed on hospital release form    Atrial fibrillation Oct  27, 2023    Basal cell carcinoma     Hypertensive disorder     Mixed hyperlipidemia     Obesity     body mass index 30+-obesity    Sleep apnea 2000 ?    Type 2 diabetes mellitus, uncontrolled      Social History     Socioeconomic History    Marital status:    Tobacco Use    Smoking status: Former     Packs/day: 1.00     Years: 20.00     Additional pack years: 0.00     Total pack years: 20.00     Types: Cigarettes    Smokeless tobacco: Never    Tobacco comments:     Started late teens stopped late 30s   Vaping Use    Vaping Use: Never used   Substance and Sexual Activity    Alcohol use: Not Currently     Comment: occasional    Drug use: Never    Sexual activity: Yes     Partners: Male     Birth control/protection: Condom, Spermicide, Injection, Birth control pill     Problem list reviewed by Lucia Cid, PharmD on 12/4/2023 at 12:18 PM    Hospitalizations and Urgent Care Since Last Assessment  ED Visits, Admissions, or Hospitalizations: None Reported  Urgent Office Visits: None Reported    Allergies  Known  allergies and reactions were discussed with the patient. The patient's chart has been reviewed for allergy information and updated as necessary.   Allergies   Allergen Reactions    Penicillins Swelling     Allergies reviewed by Lucia Cid PharmD on 12/4/2023 at 12:16 PM    Relevant Laboratory Values  Relevant laboratory values were discussed with the patient. The following specialty medication dose adjustment(s) are recommended: None until new values are obtained.   A1C Last 3 Results          12/30/2022    11:45   HGBA1C Last 3 Results   Hemoglobin A1C 7.8      Lab Results   Component Value Date    HGBA1C 7.8 12/30/2022     Lab Results   Component Value Date    GLUCOSE 375 (H) 09/27/2022    CALCIUM 10.0 09/27/2022     (L) 09/27/2022    K 4.6 09/27/2022    CO2 25.0 09/27/2022    CL 98 09/27/2022    BUN 16 09/27/2022    CREATININE 0.75 09/27/2022    EGFRIFAFRI 94 08/04/2021    EGFRIFNONA 78 08/04/2021    BCR 21.3 09/27/2022    ANIONGAP 12.0 09/27/2022     Lab Results   Component Value Date    CHLPL 170 08/04/2021    TRIG 471 (H) 08/04/2021    HDL 34 (L) 08/04/2021    LDL 64 08/04/2021       Current Medication List  This medication list has been reviewed with the patient and evaluated for any interactions or necessary modifications/recommendations, and updated to include all prescription medications, OTC medications, and supplements the patient is currently taking.  This list reflects what is contained in the patient's profile, which has also been marked as reviewed to communicate to other providers it is the most up to date version of the patient's current medication therapy.     Current Outpatient Medications:     Accu-Chek FastClix Lancets misc, Test blood glucose daily and as needed., Disp: 300 each, Rfl: 3    albuterol sulfate  (90 Base) MCG/ACT inhaler, Inhale 2 puffs 4 (Four) Times a Day., Disp: , Rfl:     apixaban (Eliquis) 5 MG tablet tablet, Take 1 tablet by mouth Every 12 (Twelve) Hours.,  Disp: 180 tablet, Rfl: 1    atorvastatin (LIPITOR) 10 MG tablet, Take 1 tablet by mouth every night at bedtime., Disp: 90 tablet, Rfl: 1    B Complex Vitamins (VITAMIN B COMPLEX PO), Take  by mouth Daily., Disp: , Rfl:     calcium carbonate (OS-SHANICE) 600 MG tablet, Take 2 tablets by mouth Daily., Disp: , Rfl:     carvedilol (COREG) 25 MG tablet, Take 1 tablet by mouth 2 (Two) Times a Day., Disp: 180 tablet, Rfl: 0    dilTIAZem XR (Dilt-XR) 120 MG 24 hr capsule, Take 1 capsule by mouth Daily., Disp: 90 capsule, Rfl: 0    esomeprazole (nexIUM) 20 MG capsule, Take 1 capsule by mouth Every Morning Before Breakfast., Disp: , Rfl:     flecainide (TAMBOCOR) 100 MG tablet, Take 1 tablet by mouth 2 (Two) Times a Day., Disp: 60 tablet, Rfl: 0    flecainide (TAMBOCOR) 100 MG tablet, Take 1 tablet by mouth 2 (Two) Times a Day., Disp: 180 tablet, Rfl: 3    glucosamine-chondroitin 500-400 MG capsule capsule, Take  by mouth 2 (Two) Times a Day., Disp: , Rfl:     glucose blood (Accu-Chek Guide) test strip, Test blood glucose daily and as needed., Disp: 300 each, Rfl: 3    hydroCHLOROthiazide (HYDRODIURIL) 25 MG tablet, Take 1 tablet by mouth Daily., Disp: 90 tablet, Rfl: 1    ipratropium (ATROVENT) 0.06 % nasal spray, Instill 1 spray into the nostrils as directed 3 times daily, Disp: 15 mL, Rfl: 3    metFORMIN (GLUCOPHAGE) 1000 MG tablet, Take 1 tablet by mouth 2 (Two) Times a Day With Meals., Disp: 180 tablet, Rfl: 3    montelukast (SINGULAIR) 10 MG tablet, Take 1 tablet by mouth Daily., Disp: 90 tablet, Rfl: 1    multivitamin with minerals tablet tablet, Take 1 tablet by mouth Daily., Disp: , Rfl:     mupirocin (BACTROBAN) 2 % ointment, Apply 1 application  topically to the appropriate area as directed 2 to 3 times a day., Disp: 22 g, Rfl: 0    polyethylene glycol (Golytely) 236 g solution, Take as directed, Disp: 4000 mL, Rfl: 0    Semaglutide, 1 MG/DOSE, (Ozempic, 1 MG/DOSE,) 4 MG/3ML solution pen-injector, Inject 1 mg under the  skin into the appropriate area as directed Every 7 (Seven) Days., Disp: 9 mL, Rfl: 3    valACYclovir (VALTREX) 1000 MG tablet, Take 1 tablet by mouth Daily for 3 days as Needed., Disp: 30 tablet, Rfl: 0    valsartan (DIOVAN) 320 MG tablet, Take 1 tablet by mouth Daily., Disp: 90 tablet, Rfl: 2    Vascepa 1 g capsule capsule, Take 2 capsules (2 grams) by mouth 2 (Two) Times a Day., Disp: 360 capsule, Rfl: 3    vitamin D3 125 MCG (5000 UT) capsule capsule, Take 1 capsule by mouth Daily., Disp: , Rfl:     vitamin E 100 UNIT capsule, Take 2 capsules by mouth Daily., Disp: , Rfl:     Zinc Sulfate (ZINC-220 PO), Take 1 tablet by mouth Daily., Disp: , Rfl:     Medicines reviewed by Lucia Cid, PharmD on 12/4/2023 at 12:18 PM    Drug Interactions  No Clinically Significant DDIs Were Identified at Present Time Upon Marking Medications Reviewed    Recommended Medications Assessment  Aspirin: Not Taking Currently  Statin: Currently Taking   ACEi/ARB: Currently Taking     Adverse Drug Reactions  Medication tolerability: Tolerating with no to minimal ADRs  Medication plan: Continue therapy with normal follow-up  Plan for ADR Management: None    Adherence, Self-Administration, and Current Therapy Problems  Adherence related to the patient's specialty therapy was discussed with the patient. The Adherence segment of this outreach has been reviewed and updated.     Adherence Questions  Linked Medication(s) Assessed: Icosapent Ethyl, Semaglutide  On average, how many doses/injections does the patient miss per month?: 0  What are the identified reasons for non-adherence or missed doses? : no problems identified  What is the estimated medication adherence level?: %  Based on the patient/caregiver response and refill history, does this patient require an MTP to track adherence improvements?: no    Additional Barriers to Patient Self-Administration: None  Methods for Supporting Patient Self-Administration: N/A    Open  Medication Therapy Problems  No medication therapy recommendations to display    Goals of Therapy  Goals related to the patient's specialty therapy were discussed with the patient. The Patient Goals segment of this outreach has been reviewed and updated.   Goals Addressed Today        Therapeutic/Clinical Target      HbA1c <7%     3/16/22: No new values since last appointment on 12/17/21 (7.1%). Patient has since been working on diet/exercise and reports being fully adherent to regimen - marking today's progress toward goal on track until next labs, which will be compared to goal on next reassessment. KL     9/2/22: No new values since last appointment on 12/17/21 (7.1%). Patient continues to work on diet/exercise and reports adherence. Patient had to cancel most recent scheduled appointment on 8/22/22. Next appointment in December. Patient will have new value at that time. Rescheduling next assessment early to align with this appointment.  KL     11/8/22: 8.3% Labs Obtained Outside System    12/30/22: 7.8%  Patient A1c increased to 8.3 then came back down to 7.8 since last visit, but overall patient has regressed. Provider increasing Ozempic today to 1mg. Will continue to evaluate on reassessment and take further action if goal continues to be not met after this medication change. KL    6/19/23: No new values since last appt on 12/30/22.  Pt reports no missed doses and PDC both >0.95.  Will eli on track at this time.  CR    12/4/23: No new values. Patient is overdue for appointment, is looking into [employer] insurance issues but needs to schedule appointment to have future refills and obtain labs. Has been discussed with patient previously and is noted on next refill outreach. Patient is adherent. KL       Therapeutic/Clinical Target      TGs Trending Downward (Goal: <150)     3/16/22: No new values since last labs 08/04//21 (471). Patient has since been working on diet/exercise and reports being fully adherent to  regimen - marking today's progress toward goal on track until next labs, which will be compared to goal on next reassessment. KL     9/2/22: No new values since last labs 08/04//21 (471). Patient has since been working on diet/exercise and reports being fully adherent to regimen - marking today's progress toward goal on track until next labs, which will be compared to goal on next reassessment. Patient canceled appt on 8/22/22, next appt in December 2022. Aligning next refill to that appointment to reassess labs and progress toward goal at that time. KL     12/30/22: Patient still reports full adherence. Most recent labs from 11/8/22 show TG at 343, so somewhat improved. Will continue current regimen. Total cholesterol is normal and provider increased Trulicity today for diabetes, TG likely secondary to uncontrolled glucose. Will continue to monitor. KL     6/19/23: No new values since last appt on 12/30/22.  Pt reports no missed doses and PDC both >0.95.  Will eli on track at this time.  CR    12/4/23: No new values. Patient is overdue for appointment, is looking into [employer] insurance issues but needs to schedule appointment to have future refills and obtain labs. Has been discussed with patient previously and is noted on next refill outreach. Patient is adherent.               Quality of Life Assessment   Quality of Life related to the patient's enrollment in the patient management program and services provided was discussed with the patient. The QOL segment of this outreach has been reviewed and updated.  Quality of Life Improvement Scale: A good deal better    Reassessment Plan & Follow-Up  1. Medication Therapy Changes: None  2. Related Plans, Therapy Recommendations, or Issues to Be Addressed: None  3. Pharmacist to perform regular assessments no more than (6) months from the previous assessment.  4. Care Coordinator to set up future refill outreaches, coordinate prescription delivery, and escalate clinical  questions to pharmacist.    Attestation  Therapeutic appropriateness: Appropriate   I attest the patient was actively involved in and has agreed to the above plan of care.  If the prescribed therapy is at any point deemed not appropriate based on the current or future assessments, a consultation will be initiated with the patient's specialty care provider to determine the best course of action. The revised plan of therapy will be documented along with any required assessments and/or additional patient education provided.     Lucia Cid PharmD, BCACP  Clinical Specialty Pharmacist, Endocrinology  12/4/2023  12:23 EST

## 2023-12-05 ENCOUNTER — PREP FOR SURGERY (OUTPATIENT)
Dept: OTHER | Facility: HOSPITAL | Age: 73
End: 2023-12-05
Payer: MEDICARE

## 2023-12-05 ENCOUNTER — OFFICE VISIT (OUTPATIENT)
Dept: ORTHOPEDIC SURGERY | Facility: CLINIC | Age: 73
End: 2023-12-05
Payer: MEDICARE

## 2023-12-05 VITALS
BODY MASS INDEX: 33.01 KG/M2 | DIASTOLIC BLOOD PRESSURE: 86 MMHG | SYSTOLIC BLOOD PRESSURE: 162 MMHG | HEIGHT: 66 IN | WEIGHT: 205.4 LBS

## 2023-12-05 DIAGNOSIS — M17.12 PRIMARY OSTEOARTHRITIS OF LEFT KNEE: Primary | ICD-10-CM

## 2023-12-05 PROCEDURE — 99214 OFFICE O/P EST MOD 30 MIN: CPT | Performed by: ORTHOPAEDIC SURGERY

## 2023-12-05 RX ORDER — TRANEXAMIC ACID 10 MG/ML
1000 INJECTION, SOLUTION INTRAVENOUS ONCE
OUTPATIENT
Start: 2023-12-05 | End: 2023-12-05

## 2023-12-05 RX ORDER — OXYCODONE HCL 10 MG/1
10 TABLET, FILM COATED, EXTENDED RELEASE ORAL ONCE
OUTPATIENT
Start: 2023-12-05 | End: 2023-12-05

## 2023-12-05 RX ORDER — CHLORHEXIDINE GLUCONATE 4 G/100ML
SOLUTION TOPICAL DAILY
Qty: 237 ML | Refills: 0 | Status: SHIPPED | OUTPATIENT
Start: 2023-12-05

## 2023-12-05 RX ORDER — ACETAMINOPHEN 500 MG
1000 TABLET ORAL ONCE
OUTPATIENT
Start: 2023-12-05 | End: 2023-12-05

## 2023-12-05 RX ORDER — VARENICLINE 0.03 MG/.05ML
SPRAY NASAL
COMMUNITY
Start: 2023-11-20

## 2023-12-05 NOTE — PROGRESS NOTES
"    [facility] Note        Subjective     CC: Follow-up (3 Month Follow Up -- Primary osteoarthritis of left knee )      FANG Lynn is a 72 y.o. female.  Patient is here today for follow-up of her left knee arthritis.  Last cortisone shot did not help her for very long at all.  She is here to discuss long-term options.    Overall, patient's symptoms are as above.    ROS:    Constiutional:Pt denies fever, chills, nausea, or vomiting.  MSK:as above        Objective      Past Medical History  Past Medical History:   Diagnosis Date    Asthma ?  2022    Listed on hospital release form    Atrial fibrillation Oct  27, 2023    Basal cell carcinoma     CTS (carpal tunnel syndrome)     Hypertensive disorder     Mixed hyperlipidemia     Obesity     body mass index 30+-obesity    Sleep apnea 2000 ?    Type 2 diabetes mellitus, uncontrolled      Social History     Socioeconomic History    Marital status:    Tobacco Use    Smoking status: Former     Packs/day: 1.00     Years: 20.00     Additional pack years: 0.00     Total pack years: 20.00     Types: Cigarettes     Passive exposure: Past    Smokeless tobacco: Never    Tobacco comments:     Started late teens stopped late 30s   Vaping Use    Vaping Use: Never used   Substance and Sexual Activity    Alcohol use: Not Currently     Comment: occasional    Drug use: Never    Sexual activity: Yes     Partners: Male     Birth control/protection: Condom, Spermicide, Birth control pill, Injection          Physical Exam  /86 (BP Location: Left arm, Patient Position: Sitting, Cuff Size: Adult)   Ht 167.6 cm (65.98\")   Wt 93.2 kg (205 lb 6.4 oz)   BMI 33.17 kg/m²     Body mass index is 33.17 kg/m².    Patient is well nourished and well developed.        Ortho Exam      Musculoskeletal:  Global Assessment:  Overall assessment of Lower Extremity Muscle Strength and Tone:  Left quadriceps--5/5   Left hamstrings--5/5       Left tibialis " anterior--5/5  Left gastroc-soleus--5/5  Left EHL --5/5    Lower Extremity:    Inspection and Palpation:  Left knee:  Tenderness:  Over the medial joint line and moderate severity  Effusion:  1+  Crepitus:  Positive  Pulses:  2+  Ecchymosis:  None  Warmth:  None     ROM:  Left:  Extension: 5     Flexion:120    Instability:    Left:    Lachman Test:  Negative   Varus stress test negative  Valgus stress test negative    Deformities/Malalignments/Discrepancies:    Left:  Genu Varum     Functional Testing:  Alexandra's test:  Negative  Patella grind test:  Positive  Q-angle:  normal      Imaging/Labs/EMG Reviewed:  Imaging Results (Last 24 Hours)       ** No results found for the last 24 hours. **              Assessment    Assessment:  1. Primary osteoarthritis of left knee        Plan:  Recommend over the counter anti-inflammatories for pain and/or swelling  Left knee arthritis--we had lengthy discussion with regard to long-term treatment options and alternatives.  At this point, patient would like to pursue left total knee arthroplasty.  Risk, benefits, potential hazards, typical recovery and rehab as well as reasonable alternatives to the procedure were discussed with her.  We talked about the robotic assistant that will be used at the time of surgery.  She will do the surgery with a 23-hour stay, she has a penicillin allergy but we will use Kefzol preoperatively.  [facility] PT rehab at home program will be used and she will use a full dose aspirin daily for DVT prophylaxis for 6 weeks after surgery.  I will see her back preoperatively to answer any final questions.      Mateo Burris MD  12/05/23  17:37 EST      Dictated Utilizing Dragon Dictation.

## 2023-12-19 DIAGNOSIS — I48.0 PAF (PAROXYSMAL ATRIAL FIBRILLATION): ICD-10-CM

## 2023-12-19 DIAGNOSIS — I10 PRIMARY HYPERTENSION: ICD-10-CM

## 2023-12-19 RX ORDER — CARVEDILOL 25 MG/1
25 TABLET ORAL 2 TIMES DAILY
Qty: 180 TABLET | Refills: 1 | Status: SHIPPED | OUTPATIENT
Start: 2023-12-19

## 2023-12-19 NOTE — TELEPHONE ENCOUNTER
Lab Results   Component Value Date    GLUCOSE 375 (H) 09/27/2022    BUN 16 09/27/2022    CREATININE 0.75 09/27/2022    EGFR 85.8 09/27/2022    BCR 21.3 09/27/2022    K 4.6 09/27/2022    CO2 25.0 09/27/2022    CALCIUM 10.0 09/27/2022    PROTENTOTREF 7.3 08/04/2021    ALBUMIN 4.40 09/27/2022    BILITOT 0.4 09/27/2022    AST 77 (H) 09/27/2022    ALT 67 (H) 09/27/2022

## 2023-12-19 NOTE — TELEPHONE ENCOUNTER
Specialty Pharmacy Patient Management Program  Prescription Request     Patient currently fills medications at  Pharmacy. Needing refill(s) on the following:      Requested Prescriptions     Pending Prescriptions Disp Refills    Blood Glucose Monitoring Suppl (True Metrix Meter) w/Device kit 1 kit 0     Sig: Use 1 kit Daily.     Pended for endocrinology provider, Dr. Whipple, to review and approve if appropriate.       Delroy Cates, PharmD, MPH  Clinical Specialty Pharmacist, Endocrinology  12/19/2023  11:47 EST

## 2023-12-20 DIAGNOSIS — I48.0 PAF (PAROXYSMAL ATRIAL FIBRILLATION): ICD-10-CM

## 2023-12-20 NOTE — TELEPHONE ENCOUNTER
No f/u scheduled in [facility]. Will route to [facility] for review.

## 2024-01-02 ENCOUNTER — TELEPHONE (OUTPATIENT)
Dept: ORTHOPEDIC SURGERY | Facility: CLINIC | Age: 74
End: 2024-01-02
Payer: MEDICARE

## 2024-01-02 RX ORDER — ICOSAPENT ETHYL 1000 MG/1
2 CAPSULE ORAL 2 TIMES DAILY
Qty: 360 CAPSULE | Refills: 0 | Status: SHIPPED | OUTPATIENT
Start: 2024-01-02

## 2024-01-02 RX ORDER — SEMAGLUTIDE 1.34 MG/ML
1 INJECTION, SOLUTION SUBCUTANEOUS
Qty: 9 ML | Refills: 0 | Status: SHIPPED | OUTPATIENT
Start: 2024-01-02

## 2024-01-02 NOTE — TELEPHONE ENCOUNTER
Patient is schedule for sx 2/28/24- and was advised by insurance [facility] they are needing prior authorization.     Please advise  [phone]

## 2024-01-02 NOTE — TELEPHONE ENCOUNTER
Specialty Pharmacy Patient Management Program  Prescription Refill Request     Patient currently fills medications at  Pharmacy. Needing refill(s) on the following:      *Patient last office visit: 12/30/22, does not currently have another visit scheduled.     Requested Prescriptions     Pending Prescriptions Disp Refills    Semaglutide, 1 MG/DOSE, (Ozempic, 1 MG/DOSE,) 4 MG/3ML solution pen-injector 9 mL 3     Sig: Inject 1 mg under the skin into the appropriate area as directed Every 7 (Seven) Days.    Vascepa 1 g capsule capsule 360 capsule 3     Sig: Take 2 capsules (2 grams) by mouth 2 (Two) Times a Day.     Pended for endocrinology provider, Dr. Whipple, to review and approve if appropriate.     Lucia Cid, PharmD, BCACP  Clinical Specialty Pharmacist, Endocrinology  1/2/2024  13:56 EST

## 2024-01-03 ENCOUNTER — SPECIALTY PHARMACY (OUTPATIENT)
Dept: ENDOCRINOLOGY | Facility: CLINIC | Age: 74
End: 2024-01-03
Payer: MEDICARE

## 2024-01-03 NOTE — PROGRESS NOTES
Specialty Pharmacy Patient Management Program  Endocrinology Refill Outreach      Renata is a 73 y.o. female contacted today regarding refills of her medication(s).    Specialty medication(s) and dose(s) confirmed: Ozempic, Vascepa  Other medication(s) being refilled: Eliquis    Refill Questions      Flowsheet Row Most Recent Value   Changes to allergies? No   Changes to medications? No   New conditions or infections since last clinic visit No   Unplanned office visit, urgent care, ED, or hospital admission in the last 4 weeks  No   How does patient/caregiver feel medication is working? Very good   Financial problems or insurance changes  No   Since the previous refill, were any specialty medication doses or scheduled injections missed or delayed?  No   Does this patient require a clinical escalation to a pharmacist? No          Delivery Questions      Flowsheet Row Most Recent Value   Delivery method [employer]   Delivery address verified with patient/caregiver? Yes   Delivery address Home   Number of medications in delivery 3   Medication(s) being filled and delivered Semaglutide, Icosapent Ethyl, Apixaban   Doses left of specialty medications 1-1.5 Weeks   Copay verified? Yes   Copay amount $180   Copay form of payment Credit/debit on file            Medication Adherence                Support network for adherence: family member, healthcare provider   Other support network: Pharmacy             Follow-Up: 84-90d    Lindsey SteinerD, BCACP  Clinical Specialty Pharmacist, Endocrinology  1/3/2024  09:47 EST

## 2024-01-31 ENCOUNTER — SPECIALTY PHARMACY (OUTPATIENT)
Dept: ENDOCRINOLOGY | Facility: CLINIC | Age: 74
End: 2024-01-31
Payer: MEDICARE

## 2024-01-31 NOTE — PROGRESS NOTES
Specialty Pharmacy Patient Management Program  Program Disenrollment      Renata Lynn is a 73 y.o. female seen by an Endocrinology provider for Type 2 Diabetes. Patient was previously enrolled in the Endocrinology Patient Management program offered by [facility].      Disenrolling patient today as she will be changing endocrinology providers.  Patient will continue to fill medications with [facility], but is no longer eligible for the Specialty program.     Lucia Cid, PharmD, [facility]  Clinical Specialty Pharmacist, Endocrinology  1/31/2024  14:47 EST     Pt seen and examined by me at bedside   no complaints other than wishes to order lunch     VSS   comfortable   NAD  +bs/nt/nd  no labs ordered     a/p:  1. fall  2. constipation  2. Distal DVT    -PT recs [facility], pending insurance appeal  -c/w norvasc  -c/w stool softener  -no AC for asymptomatic DVT

## 2024-02-14 DIAGNOSIS — I10 PRIMARY HYPERTENSION: ICD-10-CM

## 2024-02-14 DIAGNOSIS — I48.0 PAROXYSMAL ATRIAL FIBRILLATION: ICD-10-CM

## 2024-02-14 RX ORDER — DILTIAZEM HYDROCHLORIDE 120 MG/1
120 CAPSULE, EXTENDED RELEASE ORAL DAILY
Qty: 90 CAPSULE | Refills: 0 | Status: SHIPPED | OUTPATIENT
Start: 2024-02-14

## 2024-02-15 ENCOUNTER — OFFICE VISIT (OUTPATIENT)
Dept: ORTHOPEDIC SURGERY | Facility: CLINIC | Age: 74
End: 2024-02-15
Payer: MEDICARE

## 2024-02-15 ENCOUNTER — PRE-ADMISSION TESTING (OUTPATIENT)
Dept: PREADMISSION TESTING | Facility: HOSPITAL | Age: 74
End: 2024-02-15
Payer: MEDICARE

## 2024-02-15 VITALS
SYSTOLIC BLOOD PRESSURE: 120 MMHG | WEIGHT: 205.25 LBS | BODY MASS INDEX: 32.99 KG/M2 | DIASTOLIC BLOOD PRESSURE: 64 MMHG | HEIGHT: 66 IN

## 2024-02-15 VITALS — WEIGHT: 205.25 LBS | HEIGHT: 66 IN | BODY MASS INDEX: 32.99 KG/M2

## 2024-02-15 DIAGNOSIS — M17.12 PRIMARY OSTEOARTHRITIS OF LEFT KNEE: ICD-10-CM

## 2024-02-15 DIAGNOSIS — M17.12 PRIMARY OSTEOARTHRITIS OF LEFT KNEE: Primary | ICD-10-CM

## 2024-02-15 DIAGNOSIS — I10 PRIMARY HYPERTENSION: ICD-10-CM

## 2024-02-15 LAB
ALBUMIN SERPL-MCNC: 4.8 G/DL (ref 3.5–5.2)
ALBUMIN/GLOB SERPL: 1.6 G/DL
ALP SERPL-CCNC: 95 U/L (ref 39–117)
ALT SERPL W P-5'-P-CCNC: 48 U/L (ref 1–33)
ANION GAP SERPL CALCULATED.3IONS-SCNC: 12 MMOL/L (ref 5–15)
AST SERPL-CCNC: 47 U/L (ref 1–32)
BASOPHILS # BLD AUTO: 0.03 10*3/MM3 (ref 0–0.2)
BASOPHILS NFR BLD AUTO: 0.4 % (ref 0–1.5)
BILIRUB SERPL-MCNC: 0.3 MG/DL (ref 0–1.2)
BUN SERPL-MCNC: 18 MG/DL (ref 8–23)
BUN/CREAT SERPL: 20 (ref 7–25)
CALCIUM SPEC-SCNC: 10 MG/DL (ref 8.6–10.5)
CHLORIDE SERPL-SCNC: 99 MMOL/L (ref 98–107)
CO2 SERPL-SCNC: 28 MMOL/L (ref 22–29)
CREAT SERPL-MCNC: 0.9 MG/DL (ref 0.57–1)
CRP SERPL-MCNC: <0.3 MG/DL (ref 0–0.5)
DEPRECATED RDW RBC AUTO: 46 FL (ref 37–54)
EGFRCR SERPLBLD CKD-EPI 2021: 68.1 ML/MIN/1.73
EOSINOPHIL # BLD AUTO: 0.4 10*3/MM3 (ref 0–0.4)
EOSINOPHIL NFR BLD AUTO: 5.6 % (ref 0.3–6.2)
ERYTHROCYTE [DISTWIDTH] IN BLOOD BY AUTOMATED COUNT: 14.2 % (ref 12.3–15.4)
ERYTHROCYTE [SEDIMENTATION RATE] IN BLOOD: 21 MM/HR (ref 0–30)
GLOBULIN UR ELPH-MCNC: 3 GM/DL
GLUCOSE SERPL-MCNC: 279 MG/DL (ref 65–99)
HBA1C MFR BLD: 7.5 % (ref 4.8–5.6)
HCT VFR BLD AUTO: 40.8 % (ref 34–46.6)
HGB BLD-MCNC: 13 G/DL (ref 12–15.9)
IMM GRANULOCYTES # BLD AUTO: 0.03 10*3/MM3 (ref 0–0.05)
IMM GRANULOCYTES NFR BLD AUTO: 0.4 % (ref 0–0.5)
INR PPP: 1.23 (ref 0.89–1.12)
LYMPHOCYTES # BLD AUTO: 2.44 10*3/MM3 (ref 0.7–3.1)
LYMPHOCYTES NFR BLD AUTO: 34.2 % (ref 19.6–45.3)
MCH RBC QN AUTO: 28.4 PG (ref 26.6–33)
MCHC RBC AUTO-ENTMCNC: 31.9 G/DL (ref 31.5–35.7)
MCV RBC AUTO: 89.3 FL (ref 79–97)
MONOCYTES # BLD AUTO: 0.56 10*3/MM3 (ref 0.1–0.9)
MONOCYTES NFR BLD AUTO: 7.8 % (ref 5–12)
NEUTROPHILS NFR BLD AUTO: 3.68 10*3/MM3 (ref 1.7–7)
NEUTROPHILS NFR BLD AUTO: 51.6 % (ref 42.7–76)
NRBC BLD AUTO-RTO: 0 /100 WBC (ref 0–0.2)
PLATELET # BLD AUTO: 268 10*3/MM3 (ref 140–450)
PMV BLD AUTO: 10.3 FL (ref 6–12)
POTASSIUM SERPL-SCNC: 4.5 MMOL/L (ref 3.5–5.2)
PROT SERPL-MCNC: 7.8 G/DL (ref 6–8.5)
PROTHROMBIN TIME: 15.6 SECONDS (ref 12.2–14.5)
QT INTERVAL: 438 MS
QTC INTERVAL: 482 MS
RBC # BLD AUTO: 4.57 10*6/MM3 (ref 3.77–5.28)
SODIUM SERPL-SCNC: 139 MMOL/L (ref 136–145)
WBC NRBC COR # BLD AUTO: 7.14 10*3/MM3 (ref 3.4–10.8)

## 2024-02-15 PROCEDURE — 85610 PROTHROMBIN TIME: CPT

## 2024-02-15 PROCEDURE — 85025 COMPLETE CBC W/AUTO DIFF WBC: CPT

## 2024-02-15 PROCEDURE — 83036 HEMOGLOBIN GLYCOSYLATED A1C: CPT

## 2024-02-15 PROCEDURE — 36415 COLL VENOUS BLD VENIPUNCTURE: CPT

## 2024-02-15 PROCEDURE — 93010 ELECTROCARDIOGRAM REPORT: CPT | Performed by: INTERNAL MEDICINE

## 2024-02-15 PROCEDURE — 93005 ELECTROCARDIOGRAM TRACING: CPT

## 2024-02-15 PROCEDURE — 85652 RBC SED RATE AUTOMATED: CPT

## 2024-02-15 PROCEDURE — 80053 COMPREHEN METABOLIC PANEL: CPT

## 2024-02-15 PROCEDURE — 86140 C-REACTIVE PROTEIN: CPT

## 2024-02-15 RX ORDER — VALSARTAN 320 MG/1
320 TABLET ORAL DAILY
Qty: 90 TABLET | Refills: 2 | OUTPATIENT
Start: 2024-02-15

## 2024-02-15 RX ORDER — VALSARTAN 320 MG/1
320 TABLET ORAL DAILY
Qty: 90 TABLET | Refills: 0 | Status: SHIPPED | OUTPATIENT
Start: 2024-02-15

## 2024-02-15 NOTE — PAT
Discussed with patient options for receiving total joint replacement education and assessed patient's ability and preference. Joint Replacement Guide given to patient during PAT visit since not received a copy within the last year. Encouraged patient/family to read guide thoroughly and notify PAT staff with any questions or concerns. Handout provided directing patient to links to watch online videos related to joint replacement surgery on the [facility] website. The handout gives detailed instructions for joining an online joint replacement class through Zoom or phone conference offered on . Patient agreed to participate by watching videos online. Patient verbalized understanding of instructions and to complete the online learning tool survey. Encouraged to share information with family and/or . An overview of the joint replacement education was provided during the visit including general perioperative instructions that are routine for all surgical patients (PAT PASS, wipes, directions to pre-op, etc.).    Prescription for Chlorhexidine shower called into patient's pharmacy or [facility] pharmacy by patient's surgeon.  Reinforced with patient to  the prescription from applicable pharmacy if they haven't already.  Verbal and written instructions given regarding proper use of Chlorhexidine body wash to patient and/or famlily during PAT visit. Patient/family also instructed to complete checklist and return it to Pre-op on the day of surgery.  Patient and/or family verbalized understanding.    Patient to apply Chlorhexadine wipes  to surgical area (as instructed) the night before procedure and the AM of procedure. Wipes provided.    Patient instructed to drink 20 ounces of Gatorade or Gatorlyte (if diabetic) and it needs to be completed 1 hour (for Main OR patients) or 2 hours (scheduled  section & [facility] patients) before given arrival time for procedure (NO RED Gatorade and NO Gatorade  Zero).    Patient verbalized understanding.    Per Anesthesia Request, patient instructed not to take their ACE/ARB medications on the AM of surgery.    Pt has an appointment with Dr. Jacobson on 2- and will request cardiac clearance with Eliquis instructions.

## 2024-02-21 ENCOUNTER — OFFICE VISIT (OUTPATIENT)
Dept: CARDIOLOGY | Facility: CLINIC | Age: 74
End: 2024-02-21
Payer: MEDICARE

## 2024-02-21 VITALS
BODY MASS INDEX: 33.04 KG/M2 | DIASTOLIC BLOOD PRESSURE: 60 MMHG | OXYGEN SATURATION: 95 % | HEART RATE: 75 BPM | WEIGHT: 205.6 LBS | HEIGHT: 66 IN | SYSTOLIC BLOOD PRESSURE: 130 MMHG

## 2024-02-21 DIAGNOSIS — I10 BENIGN HYPERTENSION: ICD-10-CM

## 2024-02-21 DIAGNOSIS — I48.0 PAROXYSMAL ATRIAL FIBRILLATION: Primary | ICD-10-CM

## 2024-02-21 DIAGNOSIS — E78.2 MIXED HYPERLIPIDEMIA: ICD-10-CM

## 2024-02-21 PROCEDURE — 3075F SYST BP GE 130 - 139MM HG: CPT | Performed by: INTERNAL MEDICINE

## 2024-02-21 PROCEDURE — 3078F DIAST BP <80 MM HG: CPT | Performed by: INTERNAL MEDICINE

## 2024-02-21 PROCEDURE — 99214 OFFICE O/P EST MOD 30 MIN: CPT | Performed by: INTERNAL MEDICINE

## 2024-02-21 RX ORDER — FLECAINIDE ACETATE 50 MG/1
50 TABLET ORAL 2 TIMES DAILY
Qty: 180 TABLET | Refills: 1 | Status: SHIPPED | OUTPATIENT
Start: 2024-02-21

## 2024-02-21 NOTE — PROGRESS NOTES
[facility]  Office visit  Renata Lynn  1950  [phone]  There is no work phone number on file.    VISIT DATE:  2/21/2024    PCP: Anuj Sarah DO  [address]   [facility] [address]    CC:  Chief Complaint   Patient presents with    Benign hypertension       Previous cardiac studies and procedures:  October 2022  Myocardial perfusion imaging    Mild to moderate calcifications visualized in the mid LAD.  Mild to moderate scattered calcifications visualized in the descending aorta.    Left ventricular ejection fraction is hyperdynamic (Calculated EF > 70%).    Myocardial perfusion imaging indicates a normal myocardial perfusion study with no evidence of ischemia.  TTE    Left ventricular systolic function is normal. Calculated left ventricular EF = 63.4% Left ventricular ejection fraction appears to be 61 - 65%.    Left ventricular wall thickness is consistent with mild concentric hypertrophy.    Left ventricular diastolic function is consistent with (grade Ia w/high LAP) impaired relaxation.    Estimated right ventricular systolic pressure from tricuspid regurgitation is normal (<35 mmHg). Calculated right ventricular systolic pressure from tricuspid regurgitation is 21 mmHg.     ASSESSMENT:   Diagnosis Plan   1. Paroxysmal atrial fibrillation        2. Benign hypertension        3. Mixed hyperlipidemia              PLAN:  Paroxysmal atrial fibrillation: [id number] equal to 4.  Continue Eliquis 5 mg p.o. twice daily for stroke prophylaxis.  Continue rate control with combination of carvedilol and diltiazem.   Continue flecainide 50 mg p.o. daily, developed intermittent symptomatic bradycardia at higher doses..  Continue CPAP therapy.  Discussed reassessing burden of arrhythmia in 6 months and consider switching to Tikosyn.     Hypertension: Goal less than 130/80 mmHg.  Currently reasonable control.  We will continue to keep a blood  "pressure log.  Associated asymptomatic diastolic dysfunction and mild concentric left ventricular hypertrophy.  Continue current medical therapy.     Hyperlipidemia: Goal LDL less than 70.  Continue current medical therapy.      Perioperative cardiovascular risk assessment: Scheduled for total knee replacement.  Low risk for major perioperative cardiovascular complications.  Agree with holding Eliquis 2 to 3 days prior to procedure.    Subjective  Interval assessment: Episodes of A-fib occurring once or twice a week, lasting up to an hour.  Some mild fatigue but otherwise no other symptoms.    She is compliant with medical therapy.  Blood pressures running less than 130/80 mmHg.    Initial evaluation: 71-year-old diabetic female with history of hypertension, dyslipidemia and obstructive sleep apnea on CPAP therapy recently presented with symptomatic paroxysmal atrial. fib with RVR.  She is undergone cardiac evaluation with transthoracic echo and Uma scan myocardial perfusion imaging.  Carvedilol was titrated to 25 mg p.o. twice daily and Eliquis was added to her regimen.  She had initial 2 or 3 brief episodes in the 2 to 3 weeks after her initial diagnosis at the end of September which were milder and nature following titration of beta-blockade.  Has been fairly asymptomatic over the previous 4 to 6 weeks.  Was noted to have hypomagnesemia on ED evaluation, repeat 1.9.  Has not been keeping track of home blood pressures recently but were previously running less than 135/85 mmHg.  She is compliant with medical therapy.    PHYSICAL EXAMINATION:  Vitals:    02/21/24 1104   BP: 130/60   BP Location: Right arm   Patient Position: Sitting   Cuff Size: Adult   Pulse: 75   SpO2: 95%   Weight: 93.3 kg (205 lb 9.6 oz)   Height: 167.6 cm (66\")     General Appearance:    Alert, cooperative, no distress, appears stated age   Head:    Normocephalic, without obvious abnormality, atraumatic   Eyes:    conjunctiva/corneas clear "   Nose:   Nares normal, septum midline, mucosa normal, no drainage   Throat:   Lips, teeth and gums normal   Neck:   Supple, symmetrical, trachea midline, no carotid    bruit or JVD   Lungs:     Clear to auscultation bilaterally, respirations unlabored   Chest Wall:    No tenderness or deformity    Heart:    Regular rate and rhythm, S1 and S2 normal, 2/6 early peaking systolic murmur right upper sternal border, no rub   or gallop, normal carotid impulse bilaterally without bruit.   Abdomen:     Soft, non-tender   Extremities:   Extremities normal, atraumatic, no cyanosis or edema   Pulses:   2+ and symmetric all extremities   Skin:   Skin color, texture, turgor normal, no rashes or lesions       Diagnostic Data:  Procedures  Lab Results   Component Value Date    CHLPL 170 08/04/2021    TRIG 471 (H) 08/04/2021    HDL 34 (L) 08/04/2021     Lab Results   Component Value Date    GLUCOSE 279 (H) 02/15/2024    BUN 18 02/15/2024    CREATININE 0.90 02/15/2024     02/15/2024    K 4.5 02/15/2024    CL 99 02/15/2024    CO2 28.0 02/15/2024     Lab Results   Component Value Date    HGBA1C 7.50 (H) 02/15/2024     Lab Results   Component Value Date    WBC 7.14 02/15/2024    HGB 13.0 02/15/2024    HCT 40.8 02/15/2024     02/15/2024       Allergies  Allergies   Allergen Reactions    Penicillins Anaphylaxis and Swelling       Current Medications    Current Outpatient Medications:     Accu-Chek FastClix Lancets misc, Test blood glucose daily and as needed., Disp: 300 each, Rfl: 3    albuterol sulfate  (90 Base) MCG/ACT inhaler, Inhale 2 puffs Every 4 (Four) Hours As Needed., Disp: , Rfl:     apixaban (Eliquis) 5 MG tablet tablet, Take 1 tablet by mouth Every 12 (Twelve) Hours., Disp: 180 tablet, Rfl: 0    atorvastatin (LIPITOR) 10 MG tablet, Take 1 tablet by mouth every night at bedtime., Disp: 90 tablet, Rfl: 1    B Complex Vitamins (VITAMIN B COMPLEX PO), Take 1 tablet by mouth Daily., Disp: , Rfl:     Blood  Glucose Monitoring Suppl (True Metrix Meter) w/Device kit, Use to check blood sugar daily as directed by provider, Disp: 1 kit, Rfl: 0    calcium carbonate (OS-SHANICE) 600 MG tablet, Take 2 tablets by mouth Daily., Disp: , Rfl:     carvedilol (COREG) 25 MG tablet, Take 1 tablet by mouth 2 (Two) Times a Day., Disp: 180 tablet, Rfl: 1    chlorhexidine (HIBICLENS) 4 % external liquid, Shower with solution for 5 days before surgery., Disp: 237 mL, Rfl: 0    Continuous Blood Gluc Sensor (FreeStyle Terry 3 Sensor) misc, Change Every 14 (Fourteen) Days., Disp: 2 each, Rfl: 11    dilTIAZem XR (Dilt-XR) 120 MG 24 hr capsule, Take 1 capsule by mouth Daily. Needs lab work for further refills., Disp: 90 capsule, Rfl: 0    esomeprazole (nexIUM) 20 MG capsule, Take 1 capsule by mouth Every Morning Before Breakfast., Disp: , Rfl:     flecainide (TAMBOCOR) 100 MG tablet, Take 1 tablet by mouth 2 (Two) Times a Day. (Patient taking differently: Take 0.5 tablets by mouth 2 (Two) Times a Day.), Disp: 180 tablet, Rfl: 3    glucosamine-chondroitin 500-400 MG capsule capsule, Take  by mouth 2 (Two) Times a Day., Disp: , Rfl:     glucose blood (Accu-Chek Guide) test strip, Test blood glucose daily and as needed., Disp: 300 each, Rfl: 3    hydroCHLOROthiazide (HYDRODIURIL) 25 MG tablet, Take 1 tablet by mouth Daily., Disp: 90 tablet, Rfl: 1    metFORMIN (GLUCOPHAGE) 1000 MG tablet, Take 1 tablet by mouth 2 (Two) Times a Day With Meals., Disp: 180 tablet, Rfl: 3    montelukast (SINGULAIR) 10 MG tablet, Take 1 tablet by mouth Daily., Disp: 90 tablet, Rfl: 1    multivitamin with minerals tablet tablet, Take 1 tablet by mouth Daily., Disp: , Rfl:     Semaglutide, 2 MG/DOSE, (Ozempic, 2 MG/DOSE,) 8 MG/3ML solution pen-injector, Inject 2 mg under the skin into the appropriate area as directed 1 (One) Time Per Week. (Patient taking differently: Inject 2 mg under the skin into the appropriate area as directed 1 (One) Time Per Week. Sundays), Disp: 3  mL, Rfl: 0    Tyrvaya 0.03 MG/ACT solution, INSTILL 1 SPRAY IN EACH NOSTRIL TWICE DAILY APPROXIMATELY 12 HOURS APART, Disp: , Rfl:     valACYclovir (VALTREX) 1000 MG tablet, Take 1 tablet by mouth Daily for 3 days as Needed., Disp: 30 tablet, Rfl: 0    valsartan (DIOVAN) 320 MG tablet, Take 1 tablet by mouth Daily., Disp: 90 tablet, Rfl: 0    Vascepa 1 g capsule capsule, Take 2 capsules (2 grams) by mouth 2 (Two) Times a Day., Disp: 360 capsule, Rfl: 0    vitamin D3 125 MCG (5000 UT) capsule capsule, Take 1 capsule by mouth Daily., Disp: , Rfl:     vitamin E 100 UNIT capsule, Take 2 capsules by mouth Daily., Disp: , Rfl:     Zinc Sulfate (ZINC-220 PO), Take 1 tablet by mouth Daily., Disp: , Rfl:           ROS  ROS      SOCIAL HX  Social History     Socioeconomic History    Marital status:    Tobacco Use    Smoking status: Former     Packs/day: 1.00     Years: 20.00     Additional pack years: 0.00     Total pack years: 20.00     Types: Cigarettes     Quit date:      Years since quittin.1     Passive exposure: Past    Smokeless tobacco: Never    Tobacco comments:     Started late teens stopped late 30s   Vaping Use    Vaping Use: Never used   Substance and Sexual Activity    Alcohol use: Not Currently     Comment: occasional    Drug use: Never    Sexual activity: Yes     Partners: Male     Birth control/protection: Condom, Spermicide, Birth control pill, Injection       FAMILY HX  Family History   Problem Relation Age of Onset    Breast cancer Mother     Cancer Mother     Heart disease Father     Rheumatic fever Father     Heart attack Sister     Diabetes Sister     Thyroid disease Sister     Skin cancer Sister     No Known Problems Brother     Breast cancer Maternal Grandmother 89    Diabetes Maternal Grandfather     Alzheimer's disease Paternal Grandmother     No Known Problems Paternal Grandfather     Heart attack Sister     Cancer Sister              Babak Jacobson III, MD, [facility]

## 2024-02-21 NOTE — PAT
Verified patient previously completed cardiology visit for cardiac risk assessment in preparation for upcoming procedure, completion of 12-lead ECG within six months, and risk assessment letter reviewed. No further interventions required. Saw by Dr Jacobson on 2/21/24.  May hold Eliquis 2-3 days, cleared as low risk.

## 2024-02-27 ENCOUNTER — ANESTHESIA EVENT (OUTPATIENT)
Dept: PERIOP | Facility: HOSPITAL | Age: 74
End: 2024-02-27
Payer: MEDICARE

## 2024-02-27 RX ORDER — SODIUM CHLORIDE 9 MG/ML
40 INJECTION, SOLUTION INTRAVENOUS AS NEEDED
Status: CANCELLED | OUTPATIENT
Start: 2024-02-27

## 2024-02-27 RX ORDER — FAMOTIDINE 10 MG/ML
20 INJECTION, SOLUTION INTRAVENOUS ONCE
Status: CANCELLED | OUTPATIENT
Start: 2024-02-27 | End: 2024-02-27

## 2024-02-28 ENCOUNTER — ANESTHESIA EVENT CONVERTED (OUTPATIENT)
Dept: ANESTHESIOLOGY | Facility: HOSPITAL | Age: 74
End: 2024-02-28
Payer: MEDICARE

## 2024-02-28 ENCOUNTER — ANESTHESIA (OUTPATIENT)
Dept: PERIOP | Facility: HOSPITAL | Age: 74
End: 2024-02-28
Payer: MEDICARE

## 2024-02-28 ENCOUNTER — APPOINTMENT (OUTPATIENT)
Dept: GENERAL RADIOLOGY | Facility: HOSPITAL | Age: 74
End: 2024-02-28
Payer: MEDICARE

## 2024-02-28 ENCOUNTER — HOSPITAL ENCOUNTER (OUTPATIENT)
Facility: HOSPITAL | Age: 74
Discharge: HOME | End: 2024-03-01
Attending: ORTHOPAEDIC SURGERY | Admitting: ORTHOPAEDIC SURGERY
Payer: MEDICARE

## 2024-02-28 DIAGNOSIS — Z96.652 STATUS POST TOTAL LEFT KNEE REPLACEMENT: Primary | ICD-10-CM

## 2024-02-28 DIAGNOSIS — I48.0 PAF (PAROXYSMAL ATRIAL FIBRILLATION): ICD-10-CM

## 2024-02-28 DIAGNOSIS — M17.12 PRIMARY OSTEOARTHRITIS OF LEFT KNEE: ICD-10-CM

## 2024-02-28 PROBLEM — E66.9 OBESITY (BMI 30-39.9): Status: ACTIVE | Noted: 2024-02-28

## 2024-02-28 PROBLEM — Z79.01 CHRONIC ANTICOAGULATION: Status: ACTIVE | Noted: 2024-02-28

## 2024-02-28 LAB
GLUCOSE BLDC GLUCOMTR-MCNC: 154 MG/DL (ref 70–130)
GLUCOSE BLDC GLUCOMTR-MCNC: 175 MG/DL (ref 70–130)
GLUCOSE BLDC GLUCOMTR-MCNC: 182 MG/DL (ref 70–130)
GLUCOSE BLDC GLUCOMTR-MCNC: 186 MG/DL (ref 70–130)
POTASSIUM SERPL-SCNC: 4.2 MMOL/L (ref 3.5–5.2)

## 2024-02-28 PROCEDURE — 63710000001 MELOXICAM 15 MG TABLET: Performed by: ORTHOPAEDIC SURGERY

## 2024-02-28 PROCEDURE — 25010000002 CEFAZOLIN PER 500 MG: Performed by: ORTHOPAEDIC SURGERY

## 2024-02-28 PROCEDURE — A9270 NON-COVERED ITEM OR SERVICE: HCPCS | Performed by: INTERNAL MEDICINE

## 2024-02-28 PROCEDURE — 97116 GAIT TRAINING THERAPY: CPT

## 2024-02-28 PROCEDURE — 27447 TOTAL KNEE ARTHROPLASTY: CPT | Performed by: PHYSICIAN ASSISTANT

## 2024-02-28 PROCEDURE — C1755 CATHETER, INTRASPINAL: HCPCS | Performed by: ORTHOPAEDIC SURGERY

## 2024-02-28 PROCEDURE — 63710000001 INSULIN DETEMIR PER 5 UNITS: Performed by: INTERNAL MEDICINE

## 2024-02-28 PROCEDURE — A9270 NON-COVERED ITEM OR SERVICE: HCPCS | Performed by: ORTHOPAEDIC SURGERY

## 2024-02-28 PROCEDURE — C1776 JOINT DEVICE (IMPLANTABLE): HCPCS | Performed by: ORTHOPAEDIC SURGERY

## 2024-02-28 PROCEDURE — 73560 X-RAY EXAM OF KNEE 1 OR 2: CPT

## 2024-02-28 PROCEDURE — 63710000001 CARVEDILOL 12.5 MG TABLET: Performed by: INTERNAL MEDICINE

## 2024-02-28 PROCEDURE — 25810000003 LACTATED RINGERS PER 1000 ML: Performed by: ANESTHESIOLOGY

## 2024-02-28 PROCEDURE — 25010000002 BUPIVACAINE (PF) 0.25 % SOLUTION: Performed by: ANESTHESIOLOGY

## 2024-02-28 PROCEDURE — 27447 TOTAL KNEE ARTHROPLASTY: CPT | Performed by: ORTHOPAEDIC SURGERY

## 2024-02-28 PROCEDURE — 82948 REAGENT STRIP/BLOOD GLUCOSE: CPT

## 2024-02-28 PROCEDURE — G0378 HOSPITAL OBSERVATION PER HR: HCPCS

## 2024-02-28 PROCEDURE — 25010000002 ONDANSETRON PER 1 MG: Performed by: ORTHOPAEDIC SURGERY

## 2024-02-28 PROCEDURE — 63710000001 FAMOTIDINE 20 MG TABLET: Performed by: ANESTHESIOLOGY

## 2024-02-28 PROCEDURE — 25010000002 KETOROLAC TROMETHAMINE PER 15 MG: Performed by: ORTHOPAEDIC SURGERY

## 2024-02-28 PROCEDURE — 25010000002 ROPIVACAINE HCL-NACL 0.2-0.9 % SOLUTION: Performed by: NURSE ANESTHETIST, CERTIFIED REGISTERED

## 2024-02-28 PROCEDURE — 97161 PT EVAL LOW COMPLEX 20 MIN: CPT

## 2024-02-28 PROCEDURE — 63710000001 ATORVASTATIN 10 MG TABLET: Performed by: INTERNAL MEDICINE

## 2024-02-28 PROCEDURE — 63710000001 INSULIN LISPRO (HUMAN) PER 5 UNITS: Performed by: INTERNAL MEDICINE

## 2024-02-28 PROCEDURE — 63710000001 OXYCODONE 5 MG TABLET: Performed by: ORTHOPAEDIC SURGERY

## 2024-02-28 PROCEDURE — 94799 UNLISTED PULMONARY SVC/PX: CPT

## 2024-02-28 PROCEDURE — 20985 CPTR-ASST DIR MS PX: CPT | Performed by: PHYSICIAN ASSISTANT

## 2024-02-28 PROCEDURE — 63710000001 ACETAMINOPHEN EXTRA STRENGTH 500 MG TABLET: Performed by: ORTHOPAEDIC SURGERY

## 2024-02-28 PROCEDURE — A9270 NON-COVERED ITEM OR SERVICE: HCPCS | Performed by: ANESTHESIOLOGY

## 2024-02-28 PROCEDURE — 25010000002 MIDAZOLAM PER 1 MG: Performed by: NURSE ANESTHETIST, CERTIFIED REGISTERED

## 2024-02-28 PROCEDURE — 25010000002 MORPHINE PER 10 MG: Performed by: ORTHOPAEDIC SURGERY

## 2024-02-28 PROCEDURE — 25010000002 ROPIVACAINE PER 1 MG: Performed by: ORTHOPAEDIC SURGERY

## 2024-02-28 PROCEDURE — 25010000002 PROPOFOL 10 MG/ML EMULSION: Performed by: NURSE ANESTHETIST, CERTIFIED REGISTERED

## 2024-02-28 PROCEDURE — 63710000001 VALSARTAN 160 MG TABLET: Performed by: INTERNAL MEDICINE

## 2024-02-28 PROCEDURE — 25010000002 SODIUM CHLORIDE 0.9 % WITH KCL 20 MEQ 20-0.9 MEQ/L-% SOLUTION: Performed by: ORTHOPAEDIC SURGERY

## 2024-02-28 PROCEDURE — 25010000002 BUPIVACAINE 0.5 % SOLUTION: Performed by: ANESTHESIOLOGY

## 2024-02-28 PROCEDURE — 63710000001 OXYCODONE 10 MG TABLET EXTENDED-RELEASE 12 HOUR: Performed by: ORTHOPAEDIC SURGERY

## 2024-02-28 PROCEDURE — 84132 ASSAY OF SERUM POTASSIUM: CPT | Performed by: ANESTHESIOLOGY

## 2024-02-28 PROCEDURE — 20985 CPTR-ASST DIR MS PX: CPT | Performed by: ORTHOPAEDIC SURGERY

## 2024-02-28 DEVICE — IMPLANTABLE DEVICE: Type: IMPLANTABLE DEVICE | Site: KNEE | Status: FUNCTIONAL

## 2024-02-28 DEVICE — CAP TOTL KN POROUS/HYBRID PRIMARY W/ROSA: Type: IMPLANTABLE DEVICE | Site: KNEE | Status: FUNCTIONAL

## 2024-02-28 DEVICE — DEV CONTRL TISS STRATAFIX SYMM PDS PLUS VIL CT-1 45CM: Type: IMPLANTABLE DEVICE | Site: KNEE | Status: FUNCTIONAL

## 2024-02-28 DEVICE — ART/SRF KN PERSONA/VE PS EF 6TO7 10MM LT: Type: IMPLANTABLE DEVICE | Site: KNEE | Status: FUNCTIONAL

## 2024-02-28 DEVICE — PAT NXGN POR 10X32MM: Type: IMPLANTABLE DEVICE | Site: KNEE | Status: FUNCTIONAL

## 2024-02-28 RX ORDER — FENTANYL CITRATE 50 UG/ML
50 INJECTION, SOLUTION INTRAMUSCULAR; INTRAVENOUS
Status: DISCONTINUED | OUTPATIENT
Start: 2024-02-28 | End: 2024-02-28 | Stop reason: HOSPADM

## 2024-02-28 RX ORDER — ONDANSETRON 4 MG/1
4 TABLET, ORALLY DISINTEGRATING ORAL EVERY 6 HOURS PRN
Status: DISCONTINUED | OUTPATIENT
Start: 2024-02-28 | End: 2024-03-01 | Stop reason: HOSPADM

## 2024-02-28 RX ORDER — OXYCODONE HCL 10 MG/1
10 TABLET, FILM COATED, EXTENDED RELEASE ORAL ONCE
Status: COMPLETED | OUTPATIENT
Start: 2024-02-28 | End: 2024-02-28

## 2024-02-28 RX ORDER — TRANEXAMIC ACID 10 MG/ML
1000 INJECTION, SOLUTION INTRAVENOUS ONCE
Status: COMPLETED | OUTPATIENT
Start: 2024-02-28 | End: 2024-02-28

## 2024-02-28 RX ORDER — SODIUM CHLORIDE 0.9 % (FLUSH) 0.9 %
10 SYRINGE (ML) INJECTION EVERY 12 HOURS SCHEDULED
Status: DISCONTINUED | OUTPATIENT
Start: 2024-02-28 | End: 2024-02-28 | Stop reason: HOSPADM

## 2024-02-28 RX ORDER — ROPIVACAINE HYDROCHLORIDE 2 MG/ML
INJECTION, SOLUTION EPIDURAL; INFILTRATION; PERINEURAL CONTINUOUS
Status: DISCONTINUED | OUTPATIENT
Start: 2024-02-28 | End: 2024-03-01 | Stop reason: HOSPADM

## 2024-02-28 RX ORDER — INSULIN LISPRO 100 [IU]/ML
2-7 INJECTION, SOLUTION INTRAVENOUS; SUBCUTANEOUS
Status: DISCONTINUED | OUTPATIENT
Start: 2024-02-28 | End: 2024-03-01 | Stop reason: HOSPADM

## 2024-02-28 RX ORDER — ACETAMINOPHEN 500 MG
1000 TABLET ORAL EVERY 6 HOURS
Status: DISCONTINUED | OUTPATIENT
Start: 2024-02-28 | End: 2024-03-01 | Stop reason: HOSPADM

## 2024-02-28 RX ORDER — HYDROMORPHONE HYDROCHLORIDE 1 MG/ML
0.5 INJECTION, SOLUTION INTRAMUSCULAR; INTRAVENOUS; SUBCUTANEOUS
Status: DISCONTINUED | OUTPATIENT
Start: 2024-02-28 | End: 2024-02-28 | Stop reason: HOSPADM

## 2024-02-28 RX ORDER — SODIUM CHLORIDE 9 MG/ML
40 INJECTION, SOLUTION INTRAVENOUS AS NEEDED
Status: DISCONTINUED | OUTPATIENT
Start: 2024-02-28 | End: 2024-03-01 | Stop reason: HOSPADM

## 2024-02-28 RX ORDER — DOCUSATE SODIUM 100 MG/1
100 CAPSULE, LIQUID FILLED ORAL 2 TIMES DAILY PRN
Qty: 60 CAPSULE | Refills: 0 | Status: SHIPPED | OUTPATIENT
Start: 2024-02-28

## 2024-02-28 RX ORDER — ACETAMINOPHEN 500 MG
1000 TABLET ORAL ONCE
Status: COMPLETED | OUTPATIENT
Start: 2024-02-28 | End: 2024-02-28

## 2024-02-28 RX ORDER — MELOXICAM 15 MG/1
15 TABLET ORAL DAILY
Status: DISCONTINUED | OUTPATIENT
Start: 2024-02-28 | End: 2024-03-01 | Stop reason: HOSPADM

## 2024-02-28 RX ORDER — NICOTINE POLACRILEX 4 MG
15 LOZENGE BUCCAL
Status: DISCONTINUED | OUTPATIENT
Start: 2024-02-28 | End: 2024-03-01 | Stop reason: HOSPADM

## 2024-02-28 RX ORDER — SODIUM CHLORIDE AND POTASSIUM CHLORIDE 150; 900 MG/100ML; MG/100ML
50 INJECTION, SOLUTION INTRAVENOUS CONTINUOUS
Status: DISCONTINUED | OUTPATIENT
Start: 2024-02-28 | End: 2024-03-01 | Stop reason: HOSPADM

## 2024-02-28 RX ORDER — OXYCODONE HYDROCHLORIDE 5 MG/1
5 TABLET ORAL EVERY 4 HOURS PRN
Status: DISCONTINUED | OUTPATIENT
Start: 2024-02-28 | End: 2024-03-01 | Stop reason: HOSPADM

## 2024-02-28 RX ORDER — LABETALOL HYDROCHLORIDE 5 MG/ML
10 INJECTION, SOLUTION INTRAVENOUS EVERY 4 HOURS PRN
Status: DISCONTINUED | OUTPATIENT
Start: 2024-02-28 | End: 2024-03-01 | Stop reason: HOSPADM

## 2024-02-28 RX ORDER — FENTANYL CITRATE 50 UG/ML
INJECTION, SOLUTION INTRAMUSCULAR; INTRAVENOUS
Status: DISCONTINUED
Start: 2024-02-28 | End: 2024-02-28 | Stop reason: WASHOUT

## 2024-02-28 RX ORDER — SODIUM CHLORIDE 0.9 % (FLUSH) 0.9 %
3-10 SYRINGE (ML) INJECTION AS NEEDED
Status: DISCONTINUED | OUTPATIENT
Start: 2024-02-28 | End: 2024-03-01 | Stop reason: HOSPADM

## 2024-02-28 RX ORDER — BUPIVACAINE HYDROCHLORIDE 2.5 MG/ML
INJECTION, SOLUTION EPIDURAL; INFILTRATION; INTRACAUDAL
Status: COMPLETED | OUTPATIENT
Start: 2024-02-28 | End: 2024-02-28

## 2024-02-28 RX ORDER — CARVEDILOL 12.5 MG/1
25 TABLET ORAL 2 TIMES DAILY
Status: DISCONTINUED | OUTPATIENT
Start: 2024-02-28 | End: 2024-03-01 | Stop reason: HOSPADM

## 2024-02-28 RX ORDER — DOCUSATE SODIUM 100 MG/1
100 CAPSULE, LIQUID FILLED ORAL 2 TIMES DAILY PRN
Status: DISCONTINUED | OUTPATIENT
Start: 2024-02-28 | End: 2024-03-01 | Stop reason: HOSPADM

## 2024-02-28 RX ORDER — MAGNESIUM HYDROXIDE 1200 MG/15ML
LIQUID ORAL AS NEEDED
Status: DISCONTINUED | OUTPATIENT
Start: 2024-02-28 | End: 2024-02-28 | Stop reason: HOSPADM

## 2024-02-28 RX ORDER — PROPOFOL 10 MG/ML
VIAL (ML) INTRAVENOUS AS NEEDED
Status: DISCONTINUED | OUTPATIENT
Start: 2024-02-28 | End: 2024-02-28 | Stop reason: SURG

## 2024-02-28 RX ORDER — MIDAZOLAM HYDROCHLORIDE 1 MG/ML
INJECTION INTRAMUSCULAR; INTRAVENOUS AS NEEDED
Status: DISCONTINUED | OUTPATIENT
Start: 2024-02-28 | End: 2024-02-28 | Stop reason: SURG

## 2024-02-28 RX ORDER — ONDANSETRON 2 MG/ML
4 INJECTION INTRAMUSCULAR; INTRAVENOUS EVERY 6 HOURS PRN
Status: DISCONTINUED | OUTPATIENT
Start: 2024-02-28 | End: 2024-03-01 | Stop reason: HOSPADM

## 2024-02-28 RX ORDER — SODIUM CHLORIDE 0.9 % (FLUSH) 0.9 %
10 SYRINGE (ML) INJECTION AS NEEDED
Status: DISCONTINUED | OUTPATIENT
Start: 2024-02-28 | End: 2024-02-28 | Stop reason: HOSPADM

## 2024-02-28 RX ORDER — IBUPROFEN 600 MG/1
1 TABLET ORAL
Status: DISCONTINUED | OUTPATIENT
Start: 2024-02-28 | End: 2024-03-01 | Stop reason: HOSPADM

## 2024-02-28 RX ORDER — SENNOSIDES 8.6 MG
650 CAPSULE ORAL EVERY 8 HOURS
Qty: 90 TABLET | Refills: 0 | Status: SHIPPED | OUTPATIENT
Start: 2024-02-28

## 2024-02-28 RX ORDER — DOXYCYCLINE 100 MG/1
100 TABLET ORAL 2 TIMES DAILY
Qty: 14 TABLET | Refills: 0 | Status: SHIPPED | OUTPATIENT
Start: 2024-02-28 | End: 2024-03-08

## 2024-02-28 RX ORDER — SODIUM CHLORIDE, SODIUM LACTATE, POTASSIUM CHLORIDE, CALCIUM CHLORIDE 600; 310; 30; 20 MG/100ML; MG/100ML; MG/100ML; MG/100ML
9 INJECTION, SOLUTION INTRAVENOUS CONTINUOUS
Status: DISCONTINUED | OUTPATIENT
Start: 2024-02-28 | End: 2024-03-01 | Stop reason: HOSPADM

## 2024-02-28 RX ORDER — DEXTROSE MONOHYDRATE 25 G/50ML
25 INJECTION, SOLUTION INTRAVENOUS
Status: DISCONTINUED | OUTPATIENT
Start: 2024-02-28 | End: 2024-03-01 | Stop reason: HOSPADM

## 2024-02-28 RX ORDER — BUPIVACAINE HYDROCHLORIDE 5 MG/ML
INJECTION, SOLUTION PERINEURAL
Status: COMPLETED | OUTPATIENT
Start: 2024-02-28 | End: 2024-02-28

## 2024-02-28 RX ORDER — FAMOTIDINE 20 MG/1
20 TABLET, FILM COATED ORAL ONCE
Status: COMPLETED | OUTPATIENT
Start: 2024-02-28 | End: 2024-02-28

## 2024-02-28 RX ORDER — NALOXONE HCL 0.4 MG/ML
0.4 VIAL (ML) INJECTION
Status: DISCONTINUED | OUTPATIENT
Start: 2024-02-28 | End: 2024-03-01 | Stop reason: HOSPADM

## 2024-02-28 RX ORDER — ONDANSETRON 4 MG/1
4 TABLET, FILM COATED ORAL EVERY 8 HOURS PRN
Qty: 15 TABLET | Refills: 1 | Status: SHIPPED | OUTPATIENT
Start: 2024-02-28

## 2024-02-28 RX ORDER — PANTOPRAZOLE SODIUM 40 MG/1
40 TABLET, DELAYED RELEASE ORAL
Status: DISCONTINUED | OUTPATIENT
Start: 2024-02-29 | End: 2024-03-01 | Stop reason: HOSPADM

## 2024-02-28 RX ORDER — SODIUM CHLORIDE 0.9 % (FLUSH) 0.9 %
3 SYRINGE (ML) INJECTION EVERY 12 HOURS SCHEDULED
Status: DISCONTINUED | OUTPATIENT
Start: 2024-02-28 | End: 2024-03-01 | Stop reason: HOSPADM

## 2024-02-28 RX ORDER — OXYCODONE HYDROCHLORIDE 5 MG/1
5 TABLET ORAL EVERY 6 HOURS PRN
Qty: 30 TABLET | Refills: 0 | Status: SHIPPED | OUTPATIENT
Start: 2024-02-28

## 2024-02-28 RX ORDER — LIDOCAINE HYDROCHLORIDE 10 MG/ML
INJECTION, SOLUTION EPIDURAL; INFILTRATION; INTRACAUDAL; PERINEURAL AS NEEDED
Status: DISCONTINUED | OUTPATIENT
Start: 2024-02-28 | End: 2024-02-28 | Stop reason: SURG

## 2024-02-28 RX ORDER — DILTIAZEM HYDROCHLORIDE 120 MG/1
120 CAPSULE, COATED, EXTENDED RELEASE ORAL
Status: DISCONTINUED | OUTPATIENT
Start: 2024-02-29 | End: 2024-03-01 | Stop reason: HOSPADM

## 2024-02-28 RX ORDER — ATORVASTATIN CALCIUM 10 MG/1
10 TABLET, FILM COATED ORAL NIGHTLY
Status: DISCONTINUED | OUTPATIENT
Start: 2024-02-28 | End: 2024-03-01 | Stop reason: HOSPADM

## 2024-02-28 RX ORDER — MIDAZOLAM HYDROCHLORIDE 1 MG/ML
0.5 INJECTION INTRAMUSCULAR; INTRAVENOUS
Status: DISCONTINUED | OUTPATIENT
Start: 2024-02-28 | End: 2024-02-28 | Stop reason: HOSPADM

## 2024-02-28 RX ORDER — VALSARTAN 160 MG/1
320 TABLET ORAL DAILY
Status: DISCONTINUED | OUTPATIENT
Start: 2024-02-28 | End: 2024-03-01 | Stop reason: HOSPADM

## 2024-02-28 RX ORDER — LIDOCAINE HYDROCHLORIDE 10 MG/ML
0.5 INJECTION, SOLUTION EPIDURAL; INFILTRATION; INTRACAUDAL; PERINEURAL ONCE AS NEEDED
Status: COMPLETED | OUTPATIENT
Start: 2024-02-28 | End: 2024-02-28

## 2024-02-28 RX ADMIN — MIDAZOLAM HYDROCHLORIDE 2 MG: 1 INJECTION, SOLUTION INTRAMUSCULAR; INTRAVENOUS at 11:09

## 2024-02-28 RX ADMIN — ACETAMINOPHEN 1000 MG: 500 TABLET ORAL at 23:12

## 2024-02-28 RX ADMIN — DEXMEDETOMIDINE HYDROCHLORIDE 0.6 MCG/KG/HR: 100 INJECTION, SOLUTION INTRAVENOUS at 11:17

## 2024-02-28 RX ADMIN — SODIUM CHLORIDE, POTASSIUM CHLORIDE, SODIUM LACTATE AND CALCIUM CHLORIDE 9 ML/HR: 600; 310; 30; 20 INJECTION, SOLUTION INTRAVENOUS at 08:52

## 2024-02-28 RX ADMIN — TRANEXAMIC ACID 1000 MG: 10 INJECTION, SOLUTION INTRAVENOUS at 11:17

## 2024-02-28 RX ADMIN — POTASSIUM CHLORIDE AND SODIUM CHLORIDE 50 ML/HR: 900; 150 INJECTION, SOLUTION INTRAVENOUS at 17:02

## 2024-02-28 RX ADMIN — Medication 1000 MG: at 14:26

## 2024-02-28 RX ADMIN — OXYCODONE HYDROCHLORIDE 5 MG: 5 TABLET ORAL at 23:12

## 2024-02-28 RX ADMIN — FAMOTIDINE 20 MG: 20 TABLET, FILM COATED ORAL at 08:52

## 2024-02-28 RX ADMIN — Medication 3 ML: at 20:47

## 2024-02-28 RX ADMIN — VALSARTAN 320 MG: 160 TABLET, FILM COATED ORAL at 20:46

## 2024-02-28 RX ADMIN — INSULIN DETEMIR 10 UNITS: 100 INJECTION, SOLUTION SUBCUTANEOUS at 20:46

## 2024-02-28 RX ADMIN — CARVEDILOL 25 MG: 12.5 TABLET, FILM COATED ORAL at 20:47

## 2024-02-28 RX ADMIN — ACETAMINOPHEN 1000 MG: 500 TABLET ORAL at 18:18

## 2024-02-28 RX ADMIN — INSULIN LISPRO 2 UNITS: 100 INJECTION, SOLUTION INTRAVENOUS; SUBCUTANEOUS at 18:18

## 2024-02-28 RX ADMIN — BUPIVACAINE HYDROCHLORIDE 20 ML: 2.5 INJECTION, SOLUTION EPIDURAL; INFILTRATION; INTRACAUDAL; PERINEURAL at 14:09

## 2024-02-28 RX ADMIN — BUPIVACAINE HYDROCHLORIDE 2 ML: 5 INJECTION, SOLUTION PERINEURAL at 11:13

## 2024-02-28 RX ADMIN — OXYCODONE HYDROCHLORIDE 5 MG: 5 TABLET ORAL at 18:18

## 2024-02-28 RX ADMIN — SODIUM CHLORIDE, POTASSIUM CHLORIDE, SODIUM LACTATE AND CALCIUM CHLORIDE: 600; 310; 30; 20 INJECTION, SOLUTION INTRAVENOUS at 12:29

## 2024-02-28 RX ADMIN — MELOXICAM 15 MG: 15 TABLET ORAL at 18:18

## 2024-02-28 RX ADMIN — ACETAMINOPHEN 1000 MG: 500 TABLET ORAL at 08:52

## 2024-02-28 RX ADMIN — POTASSIUM CHLORIDE AND SODIUM CHLORIDE 50 ML/HR: 900; 150 INJECTION, SOLUTION INTRAVENOUS at 20:47

## 2024-02-28 RX ADMIN — TRANEXAMIC ACID 1000 MG: 10 INJECTION, SOLUTION INTRAVENOUS at 13:00

## 2024-02-28 RX ADMIN — LIDOCAINE HYDROCHLORIDE 0.5 ML: 10 INJECTION, SOLUTION EPIDURAL; INFILTRATION; INTRACAUDAL; PERINEURAL at 08:52

## 2024-02-28 RX ADMIN — INSULIN LISPRO 2 UNITS: 100 INJECTION, SOLUTION INTRAVENOUS; SUBCUTANEOUS at 20:46

## 2024-02-28 RX ADMIN — SODIUM CHLORIDE 2 G: 900 INJECTION INTRAVENOUS at 11:09

## 2024-02-28 RX ADMIN — ONDANSETRON 4 MG: 2 INJECTION INTRAMUSCULAR; INTRAVENOUS at 17:02

## 2024-02-28 RX ADMIN — PROPOFOL 50 MG: 10 INJECTION, EMULSION INTRAVENOUS at 11:11

## 2024-02-28 RX ADMIN — LIDOCAINE HYDROCHLORIDE 50 MG: 10 INJECTION, SOLUTION EPIDURAL; INFILTRATION; INTRACAUDAL; PERINEURAL at 11:11

## 2024-02-28 RX ADMIN — OXYCODONE HYDROCHLORIDE 10 MG: 10 TABLET, FILM COATED, EXTENDED RELEASE ORAL at 08:52

## 2024-02-28 RX ADMIN — ATORVASTATIN CALCIUM 10 MG: 10 TABLET, FILM COATED ORAL at 20:47

## 2024-02-28 RX ADMIN — SODIUM CHLORIDE 2000 MG: 900 INJECTION INTRAVENOUS at 17:02

## 2024-02-28 NOTE — THERAPY EVALUATION
Patient Name: Renata Lynn  : 1950    MRN: [id number]                              Today's Date: 2024       Admit Date: 2024    Visit Dx:     ICD-10-CM ICD-9-CM   1. Primary osteoarthritis of left knee  M17.12 715.16     Patient Active Problem List   Diagnosis    Uncontrolled type 2 diabetes mellitus with hyperglycemia    Benign hypertension    Mixed hyperlipidemia    Primary osteoarthritis of left knee    Paroxysmal atrial fibrillation    S/P TKR (total knee replacement), left    Chronic anticoagulation    Obesity (BMI 30-39.9)     Past Medical History:   Diagnosis Date    Asthma ?      Listed on hospital release form    Atrial fibrillation Oct  27, 2023    Basal cell carcinoma     face    CTS (carpal tunnel syndrome)     GERD (gastroesophageal reflux disease)     Hypertensive disorder     Mixed hyperlipidemia     Obesity     body mass index 30+-obesity    Rotator cuff syndrome 01/15/24    Currently in pt    Sleep apnea  ?    CPAP nightly    Tear of meniscus of knee     Surgery scheduled 24    Type 2 diabetes mellitus, uncontrolled      Past Surgical History:   Procedure Laterality Date    APPENDECTOMY      CARPAL TUNNEL RELEASE Bilateral     CATARACT EXTRACTION, BILATERAL      COLONOSCOPY      FOOT SURGERY Right     hammer toe surgery    HAND SURGERY      SKIN CANCER EXCISION      22 years ago-basal cell carcinoma removed off of face    WISDOM TOOTH EXTRACTION        General Information       Row Name 24 1808          Physical Therapy Time and Intention    Document Type evaluation  -HW     Mode of Treatment physical therapy  -       Row Name 24 1808          General Information    Patient Profile Reviewed yes  -HW     Prior Level of Function min assist:;all household mobility;community mobility;gait;transfer;bed mobility;ADL's  -HW     Existing Precautions/Restrictions fall;other (see comments)  adductor canal nerve cath  -HW     Barriers to Rehab none  identified  -[facility] Name 02/28/24 1808          Living Environment    People in Home spouse  -[facility] Name 02/28/24 1808          Home Main Entrance    Number of Stairs, Main Entrance one  -[facility] Name 02/28/24 1808          Stairs Within Home, Primary    Stairs, Within Home, Primary two places in home with one step  -     Number of Stairs, Within Home, Primary one  -       Row Name 02/28/24 1808          Cognition    Orientation Status (Cognition) oriented x 3  -[facility] Name 02/28/24 1808          Safety Issues, Functional Mobility    Safety Issues Affecting Function (Mobility) awareness of need for assistance;insight into deficits/self-awareness  -     Impairments Affecting Function (Mobility) balance;endurance/activity tolerance;pain;range of motion (ROM);strength  -               User Key  (r) = Recorded By, (t) = Taken By, (c) = Cosigned By      Initials Name Provider Type     Zenaida Morrow PT Physical Therapist                   Mobility       [facility] Name 02/28/24 1809          Bed Mobility    Bed Mobility supine-sit  -     Supine-Sit [place] (Bed Mobility) minimum assist (75% patient effort)  -     Comment, (Bed Mobility) assist secondary to nausea. Dizziness initially reported when coming to sit EOB. No significant drop in BP recorded. Symptoms resolved with time  -[facility] Name 02/28/24 1809          Transfers    Comment, (Transfers) VC for hand placement  -[facility] Name 02/28/24 1809          Sit-Stand Transfer    Sit-Stand [place] (Transfers) contact guard;nonverbal cues (demo/gesture);verbal cues;2 person assist  -     Assistive Device (Sit-Stand Transfers) walker, front-wheeled  -[facility] Name 02/28/24 1809 02/28/24 1702       Gait/Stairs (Locomotion)    [place] Level (Gait) contact guard;nonverbal cues (demo/gesture);verbal cues;2 person assist  - --    Assistive Device (Gait) walker, front-wheeled  - --    Patient was able to Ambulate  yes  - yes  -    Distance in Feet (Gait) 40  - 40  -    Deviations/Abnormal Patterns (Gait) bilateral deviations;base of support, wide;weight shifting decreased;stride length decreased;gait speed decreased  - --    Bilateral Gait Deviations forward flexed posture;heel strike decreased  - --    Comment, (Gait/Stairs) Pt amb with step-through gait pattern. VC for upright posture, increased weight acceptance onto LLE, increased stride length, and increased heel strike with good improvement overall. Slow gait speed noted. Activity limited by N/V. RN notified. No knee buckling or LOB noted.  - --      Row Name 02/28/24 1809          Mobility    Extremity Weight-bearing Status left lower extremity  -     Left Lower Extremity (Weight-bearing Status) weight-bearing as tolerated (WBAT)  -               User Key  (r) = Recorded By, (t) = Taken By, (c) = Cosigned By      Initials Name Provider Type     Zenaida Morrow PT Physical Therapist                   Obj/Interventions       [facility] Name 02/28/24 1811          Range of Motion Comprehensive    General Range of Motion lower extremity range of motion deficits identified  -     Comment, General Range of Motion Surgical knee ROM: 10-74  -[facility] Name 02/28/24 1811          Strength Comprehensive (MMT)    General Manual Muscle Testing (MMT) Assessment lower extremity strength deficits identified  -     Comment, General Manual Muscle Testing (MMT) Assessment Pt able to perform B active ankle DF and SLR  -[facility] Name 02/28/24 1811          Motor Skills    Therapeutic Exercise knee;ankle  -[facility] Name 02/28/24 1811          Knee (Therapeutic Exercise)    Knee (Therapeutic Exercise) isometric exercises;strengthening exercise  -     Knee Isometrics (Therapeutic Exercise) quad sets;left;10 repetitions  -     Knee Strengthening (Therapeutic Exercise) SLR (straight leg raise);SAQ (short arc quad);LAQ (long arc quad);heel slides;left;3 repetitions   -[facility] Name 02/28/24 1811          Ankle (Therapeutic Exercise)    Ankle (Therapeutic Exercise) AROM (active range of motion)  -     Ankle AROM (Therapeutic Exercise) bilateral;dorsiflexion;plantarflexion;10 repetitions  -[facility] Name 02/28/24 1811          Balance    Balance Assessment sitting static balance;sitting dynamic balance;sit to stand dynamic balance;standing static balance;standing dynamic balance  -     Static Sitting Balance standby assist  -     Dynamic Sitting Balance standby assist  -     Position, Sitting Balance sitting edge of bed  -     Static Standing Balance contact guard  -     Dynamic Standing Balance contact guard  -     Position/Device Used, Standing Balance supported;walker, rolling  -     Balance Interventions sitting;standing;sit to stand;occupation based/functional task  -[facility] Name 02/28/24 1811          Sensory Assessment (Somatosensory)    Sensory Assessment (Somatosensory) LE sensation intact  -               User Key  (r) = Recorded By, (t) = Taken By, (c) = Cosigned By      Initials Name Provider Type     Zenaida Morrow, CHRISTOPHER Physical Therapist                   Goals/Plan       [facility] Name 02/28/24 1814          Bed Mobility Goal 1 (PT)    Activity/Assistive Device (Bed Mobility Goal 1, PT) sit to supine/supine to sit  -     [place] Level/Cues Needed (Bed Mobility Goal 1, PT) modified independence  -HW     Time Frame (Bed Mobility Goal 1, PT) long term goal (LTG);3 days  -[facility] Name 02/28/24 1814          Transfer Goal 1 (PT)    Activity/Assistive Device (Transfer Goal 1, PT) sit-to-stand/stand-to-sit  -     [place] Level/Cues Needed (Transfer Goal 1, PT) modified independence  -HW     Time Frame (Transfer Goal 1, PT) long term goal (LTG);3 days  -[facility] Name 02/28/24 1814          Gait Training Goal 1 (PT)    Activity/Assistive Device (Gait Training Goal 1, PT) gait (walking locomotion)  -     [place] Level  (Gait Training Goal 1, PT) modified independence  -HW     Distance (Gait Training Goal 1, PT) 150  -HW     Time Frame (Gait Training Goal 1, PT) long term goal (LTG);3 days  -       Row Name 02/28/24 1814          ROM Goal 1 (PT)    ROM Goal 1 (PT) Surgical Knee ROM: 0-90  -HW     Time Frame (ROM Goal 1, PT) long-term goal (LTG);3 days  -       Row Name 02/28/24 1814          Stairs Goal 1 (PT)    Activity/Assistive Device (Stairs Goal 1, PT) ascending stairs;descending stairs  -     [place] Level/Cues Needed (Stairs Goal 1, PT) modified independence  -HW     Number of Stairs (Stairs Goal 1, PT) 1  -HW     Time Frame (Stairs Goal 1, PT) long term goal (LTG);3 days  -       Row Name 02/28/24 1814          Therapy Assessment/Plan (PT)    Planned Therapy Interventions (PT) balance training;bed mobility training;gait training;home exercise program;ROM (range of motion);patient/family education;stair training;strengthening;stretching;transfer training  -               User Key  (r) = Recorded By, (t) = Taken By, (c) = Cosigned By      Initials Name Provider Type     Zenaida Morrow, PT Physical Therapist                   Clinical Impression       Row Name 02/28/24 1811          Pain    Pretreatment Pain Rating 3/10  -     Posttreatment Pain Rating 4/10  -     Pain Location - Side/Orientation Left  -     Pain Location generalized  -     Pain Location - knee  -     Pain Intervention(s) Ambulation/increased activity;Repositioned;Elevated;Cold applied  -       Row Name 02/28/24 1811          Plan of Care Review    Plan of Care Reviewed With patient;spouse;daughter  -     Progress no change  -     Outcome Evaluation Pt amb 40' with FWW and CGAx2. Slow gait speed and decreased weight acceptance onto LLE. No knee buckling or LOB observed. Activity limited by N/V. HEP and precautions reviewed with pt. Frequent ambulation encouraged. Recommend d/c home with assist and HHPT when medically  appropriate. Pt would benefit from additional PT tomorrow to address mobility deficits and assess stairs.  -       Row Name 02/28/24 1811          Therapy Assessment/Plan (PT)    Rehab Potential (PT) good, to achieve stated therapy goals  -     Criteria for Skilled Interventions Met (PT) yes;meets criteria;skilled treatment is necessary  -     Therapy Frequency (PT) 2 times/day  -       Row Name 02/28/24 1811          Vital Signs    Pre Patient Position Supine  -     Intra Patient Position Standing  -     Post Patient Position Sitting  -       Row Name 02/28/24 1811          Positioning and Restraints    Pre-Treatment Position in bed  -     Post Treatment Position chair  -     In Chair notified nsg;reclined;sitting;call light within reach;encouraged to call for assist;exit alarm on;with family/caregiver;legs elevated;compression device  -               User Key  (r) = Recorded By, (t) = Taken By, (c) = Cosigned By      Initials Name Provider Type     Zenaida Morrow, CHRISTOPHER Physical Therapist                   Outcome Measures       Row Name 02/28/24 1814 02/28/24 1617       How much help from another person do you currently need...    Turning from your back to your side while in flat bed without using bedrails? 3  -HW 3  -GJ    Moving from lying on back to sitting on the side of a flat bed without bedrails? 3  -HW 3  -GJ    Moving to and from a bed to a chair (including a wheelchair)? 3  -HW 3  -GJ    Standing up from a chair using your arms (e.g., wheelchair, bedside chair)? 3  - 3  -GJ    Climbing 3-5 steps with a railing? 3  - 2  -GJ    To walk in hospital room? 3  -HW 3  -GJ    AM-PAC 6 Clicks Score (PT) 18  - 17  -GJ    Highest Level of Mobility Goal 6 --> Walk 10 steps or more  - 5 --> Static standing  -      Row Name 02/28/24 1814          PADD    Diagnosis 1  -     Gender 2  -     Age Group 2  -     Gait Distance 0  -HW     Assist Level 1  -HW     Home Support 3  -HW     PADD  Score 7  -     Patient Preference home with home health  -     Prediction by PADD Score extended rehabilitation  -       Row Name 02/28/24 1814          Functional Assessment    Outcome Measure Options AM-PAC 6 Clicks Basic Mobility (PT);PADD  -               User Key  (r) = Recorded By, (t) = Taken By, (c) = Cosigned By      Initials Name Provider Type     Penelope Mayes, RN Registered Nurse     Zenaida Morrow, CHRISTOPHER Physical Therapist                                 Physical Therapy Education       Title: PT OT SLP Therapies (Done)       Topic: Physical Therapy (Done)       Point: Mobility training (Done)       Learning Progress Summary             Patient Acceptance, E,D, VU,DU by  at 2/28/2024 1815                         Point: Home exercise program (Done)       Learning Progress Summary             Patient Acceptance, E,D, VU,DU by  at 2/28/2024 1815                         Point: Body mechanics (Done)       Learning Progress Summary             Patient Acceptance, E,D, VU,DU by  at 2/28/2024 1815                         Point: Precautions (Done)       Learning Progress Summary             Patient Acceptance, E,D, VU,DU by  at 2/28/2024 1815                                         User Key       Initials Effective Dates Name Provider Type Discipline     12/15/23 -  Zenaida Morrow, CHRISTOPHER Physical Therapist PT                  PT Recommendation and Plan  Planned Therapy Interventions (PT): balance training, bed mobility training, gait training, home exercise program, ROM (range of motion), patient/family education, stair training, strengthening, stretching, transfer training  Plan of Care Reviewed With: patient, spouse, daughter  Progress: no change  Outcome Evaluation: Pt amb 40' with FWW and CGAx2. Slow gait speed and decreased weight acceptance onto LLE. No knee buckling or LOB observed. Activity limited by N/V. HEP and precautions reviewed with pt. Frequent ambulation encouraged. Recommend d/c  home with assist and HHPT when medically appropriate. Pt would benefit from additional PT tomorrow to address mobility deficits and assess stairs.     Time Calculation:   PT Evaluation Complexity  History, PT Evaluation Complexity: 1-2 personal factors and/or comorbidities  Examination of Body Systems (PT Eval Complexity): total of 3 or more elements  Clinical Presentation (PT Evaluation Complexity): stable  Clinical Decision Making (PT Evaluation Complexity): low complexity  Overall Complexity (PT Evaluation Complexity): low complexity     PT Charges       Row Name 02/28/24 1816             Time Calculation    Start Time 1633  -HW      PT Received On 02/28/24  -HW      PT Goal Re-Cert Due Date 03/09/24  -HW         Timed Charges    85950 - Gait Training Minutes  8  -HW         Untimed Charges    PT Eval/Re-eval Minutes 40  -HW         Total Minutes    Timed Charges Total Minutes 8  -HW      Untimed Charges Total Minutes 40  -HW       Total Minutes 48  -HW                User Key  (r) = Recorded By, (t) = Taken By, (c) = Cosigned By      Initials Name Provider Type     Zenaida Morrow, PT Physical Therapist                  Therapy Charges for Today       Code Description Service Date Service Provider Modifiers Qty    43153407689 HC GAIT TRAINING EA 15 MIN 2/28/2024 Zenaida Morrow, PT GP 1    22227138789 HC PT THER SUPP EA 15 MIN 2/28/2024 Zenaida Morrow, PT GP 3    70902012499 HC PT EVAL LOW COMPLEXITY 3 2/28/2024 Zenaida Morrow, PT GP 1            PT G-Codes  Outcome Measure Options: AM-PAC 6 Clicks Basic Mobility (PT), PADD  AM-PAC 6 Clicks Score (PT): 18  PT Discharge Summary  Anticipated Discharge Disposition (PT): home with assist, home with home health    Zenaida Morrow PT  2/28/2024

## 2024-02-28 NOTE — DISCHARGE INSTRUCTIONS
Discharge Instructions--Total Knee Replacement  Dr. Burris      Congratulations!.  You have had knee replacement surgery.  The knee joint forms where the thigh bone, shin bone, and kneecap meet.  The knee joint is supported by muscles and ligaments.  It is lined with a cushioning called cartilage.  Over time, the cartilage wears away which can make the knee feel stiff and painful.  Dr. Burris replaced your painful joint with an artificial joint to relieve the pain and restore range of motion.  Here are some directions to follow once you are at home.        **Medication/Pain management at home**    Medication schedule  A.  Expect to have pain following surgery.  Our goal is to maintain a manageable pain level.  The pain medications prescribed will provide relief but do not take away all of the pain.  The first few days after surgery can be the most painful.  Just keep in mind that it will get better.  B.  Staying on top of your pain with the combination of medications prescribed to you on a regular schedule is the best way to keep the pain from getting too severe.  You can begin weaning off the pain medication (i.e. oxycodone, Tylenol, ibuprofen) as symptoms allow.  Please make sure that your pain is controlled well enough to fully participate with physical therapy.  C.  The most effective way to take the medications is to take the Ibuprofen and Tylenol together every 8 hours.  If that does not do the trick then supplement with the oxycodone or hydrocodone.  If you are taking the oxycodone or hydrocodone, please be sure to also take the Colace (stool softener) to reduce the risk of constipation.  One of the major side effects of opioid pain medications is constipation.  Please keep this in mind.  D.  Do not wait until your pain level is at an 8 or 9 before taking medications.  At this point, you have already lost control of your pain and it will take a higher dosage to get back to a comfortable level.   Remember that the medications you are on take between 20 to 30 minutes to begin taking effect.    2.  Ice/compression/elevation  A.  Icing is helpful to reduce pain and swelling.  Icing 20 minutes at a time at least 3 times a day will be beneficial.  Do not place the ice directly on the skin itself but use a barrier such as a washcloth or a towel between the ice and your skin.  B.  Elevation works wonders for swelling.  Remember that the leg must be elevated at or above the heart for this to be effective.  C.  Remember not to place pillows directly under the knee but instead place them under your heel and ankle.  Placing pillows directly into your knee may lead to stiffness and difficulty regaining your extension.  D.  Sitting in a chair with the leg extended in front of you is not sufficient elevation and we will not make a significant difference in your swelling.  If your ankle is below your heart, your swelling may worsen.  E.  Compression is very helpful on the leg when you cannot elevate or ice.  Using an Ace wrap or compression sleeve are two good options.  Be sure to wrap the ace wrap starting from the foot and continuing up above the knee.      3.  DVT prevention  A.  Because you have had knee replacement surgery, you are at higher risk for developing blood clots.  The more active you are, the lower the risk of developing blood clots.  B.  We also reduce the risk of this rare complication from surgery by giving you medication such as aspirin, Eliquis, or Xarelto.  Please let Dr. Burris know if you have a personal history of blood clots or a first-degree relative has a history of blood clots that you did not tell him about preoperatively.  This may require a change in your blood clot prevention medication that he has prescribed.  C.  The blood clot prevention medications must be taken as scheduled.  Please let the office know if you are having trouble tolerating the medication or affording the  medication.    4.  Planning for pain medication refills   A.  Keep track of the number of pills you are taking on a daily basis and how many you have left.   B.  It takes on average 24-hours for our office to refill medications  C.  Medications will not be called in on the weekends or after hours.  More than likely the doctors that are on call for emergencies are not familiar with your case and will not call in pain medication.  D.  Be especially aware if the weekend is coming up and plan accordingly.  If you feel like you may run out of medication over the weekend, please call earlier in the week.    5.  Resuming normal home medications  A.  Unless directed otherwise by your primary care physician, Dr. Burris, or the hospitalist, you may resume all normal home medications after discharge from the hospital.  If you have questions please call the office.  B.  If you are on medications prescribed by a specialist (cardiologist, rheumatologist, etc.) contact their office about any questions or changes to those medications following surgery.      **Incision Care**    Your Exofin Fusion dressing system is designed to stay in place until your first follow-up appointment in 3 weeks.  Underneath the Exofin Fusion dressing system (looks similar to drywall tape), there are absorbable sutures which will dissolve on their own over time.  On occasion, people have a reaction to the Exofin Fusion dressing system and develop a rash.  Please call the office if this occurs.  You may shower following surgery once your nerve block has been removed.  Please keep the incision clean and dry, however.  The best way to do this is with cling wrap or a plastic bag taped on both ends.  Sit on a shower chair or stool when you shower to keep from falling.  While you may shower normally after surgery, baths unfortunately will disrupt the dressing and increase your risk of infection.  Similarly, do not submerge your operative leg in a bathtub,  swimming pool, or hot tub until you have been cleared to do so by Dr. Burris.  It usually takes about 6 weeks of healing time until these activities are allowed.  If your Exofin Fusion dressing system starts to peel off before your appointment, simply trim the edges and leave as much of it intact as possible.  After the Exofin Fusion dressing system has been removed at your 3-week postop appointment, do not put any creams, lotions, antibiotic ointments, or scar creams on your incision.  Your incision needs to be completely healed (no scabs) until these products can be used safely.  Your operative knee will be warmer to the touch than your nonsurgical knee for at least a few months.  If you notice, however, that your knee is very hot to the touch, associated with any visible drainage or marked redness, or you are having a fever or a dramatic increase in your pain not related to activity, please call the office or the surgeon on call immediately.      **Activity**    You will have 1-2 physical therapy sessions while in the hospital.  Please make sure you have a physical therapy appointment scheduled within 3 to 5 days of returning home.  If you do not or have not heard anything with regards to scheduling, please contact the office.  Typically physical therapy is scheduled during your preop visit or during your hospital visit.  Very important!  Get that knee moving!  Range of motion is the MOST important aspect of your rehab in the first 2 weeks after surgery.  Once you have reached sufficient range of motion, your physical therapist will advance your rehab and add strengthening exercises.  Your range of motion goals are 0 degrees of extension and 90 degrees of flexion at the 3-week visit.  By 6 weeks, we want 0 degrees of extension and 120 degrees of flexion.  In rare cases, if you fail to meet your range of motion goals, we may have to take you back to the operating room to perform a procedure to restore your  range of motion.  This is called a manipulation under anesthesia and not something we love doing.  This can be avoided by being vigilant about restoring your range of motion as soon as possible before scar tissue forms.  Daily movement is important to improve your comfort following surgery.  You should plan on some gentle walking and stretching 2-3 times a day in addition to your physical therapy exercises.  Recovery from a knee replacement takes time.  There will be good days where you feel hardly any pain and bad days following the surgery where you feel your pain and swelling are greater.  Know that your body is healing from a major surgery and pay attention to cues when you need to rest and take a break.  Lean on your physical therapist to help you differentiate between good pain and bad pain.  Sit in chairs with arms.  The arms make it easier for you to stand up and sit down.  Do not sit for more than 30 to 45 minutes at a time.  Nap if you are tired but do not stay in bed all day.  Do not drive until your healthcare provider says it is okay.  Most people can start driving at about 6 weeks after surgery.  Do not drive while you are taking opioid pain medication.  Remove items that may cause you to fall such as throw rugs and electrical cords.  Use nonslip bath mats, grab bars, and elevated toilet seat, and a shower chair in your bathroom.  Until your balance, flexibility, and strength improved, use a cane or walker.  Typically your physical therapist will tell you when you can wean from these devices.  Tells your dentist and all your healthcare providers that you have an artificial joint.  You may be directed to take antibiotics as prescribed for any dental work.  Dental work is not allowed until 6 weeks after surgery.          **Post surgical appointments**    Be sure to schedule your outpatient physical therapy appointments before your surgery takes place.  Once you are discharged from the hospital, expect to  attend physical therapy 2-3 times a week for the first 6 weeks.  On days when you are not attending formal therapy, you are expected to do your home exercises given to you by your therapist.    Plan to take your pain medication about 30 minutes before your physical therapy sessions.  If you are taking narcotic pain medicine prior to your therapy, please be sure to have a  to take you to and from your appointments.  Please make sure you have a post operative appointment to see Dr. Burris or his PA around 3 weeks after surgery.  This appointment is typically made by the surgery scheduler but sometimes things happen.  If you do not have an appointment, please call the office.          **Call 911**  Call 911 right away if you are experiencing chest pain or shortness of breath        **Who to contact**    Call your healthcare provider if any of the following occur:  Fever of 100 or higher  Pain or swelling in your calf  Shaking chills  Stiffness or inability to move the knee  Increased swelling in your leg  Increased redness, tenderness, or swelling in or around the knee incision  Drainage from the knee incision  Increased knee pain  Our office is open from 8 AM to 4:30 PM Monday through Thursday and 8 AM to 3 PM on Fridays.  The office number is [phone].  If you need to contact someone after hours, please call the [phone] and asked for the orthopedic surgeon on-call for Dr. Burris.  That phone number is [phone].              InfuBLOCK - Patient Information    What is a pain pump?  The InfuBLOCK pump delivers post-operative, non-narcotic, numbing medication to the nerve near the surgical site for pain relief.     Where can I find information about my pain pump?           For more information about your pain pump, scan the QR code.  For additional patient resources, visit [email or web address].                                                                                                While your physician is your primary source for information about your treatment there may be times during your treatment that you need assistance with your infusion pump.     If you need assistance take the following steps:    The [employer] Nursing Hotline is Here for You 24/7.  Please call 1-520.208.1520 for the following concerns or complications:    Answers to questions about your infusion pump                 Tubing disconnect  Assistance with pump alarms                                                      Dislodged catheter  Excessive leakage noted from pump                                         Inadequate pain control    2.   [facility] Anesthesia Acute Pain Service: [phone] is available 24/7 for any further needs or concerns about medication or pain control.     -------------------------------------------------------------------------    Nerve Catheter Removal Instructions  When your device is empty:    Remove your catheter by pulling the dressing off slowly (like you would remove a regular bandage). The catheter should pull right out of the skin.  Check that the BLUE tip is intact.                                                                                     If the catheter is stuck, reposition your   extremity and pull slowly until removed.  *If catheter is HURTING and WON'T come out, stop and call [phone] for further assistance.    Remove medication bag from the black carrying case.  Cut the tubing on right and left side of pump, and discard the medication bag and tubing into garbage.  Place the pump and black carrying case into the plastic bag and then place this into the return box.  Seal box with blue stickers and return to [employer] postal service. THIS IS PRE-PAID POSTAGE.        -------------------------------------------------------------------------    [facility] COLD THERAPY - PATIENT INSTRUCTION SHEET    Cold Compression Therapy for your comfort and  rehabilitation  Your caregivers want you to be productive in your rehab and comfortable during your stay. In keeping with those goals, you will be receiving an SMI Cold Therapy Wrap to help ease post-operative pain and swelling that might keep you from getting back on track! Your SMI Cold Therapy Wrap is effective and simple-to-use, and you will be encouraged to apply it throughout your hospital stay and at home through the duration of your recovery.    When you are ready to go home  Be sure to take your SMI Cold Therapy Wrap and both sets of Gel Bags with you for continued comfort and use throughout your rehabilitation. If you don't already have them, ask your nurse or aide to retrieve your SMI Gel Bags from the patient freezer.    Home use precautions  Always follow your medical professional's application instructions upon discharge. Your SMI Cold Therapy Wrap and Gel Bags are designed to last for months following your surgery. Never heat the Gel Bags unless specified by your healthcare provider. Supervision is advised when using this product on children or geriatric patients. To avoid danger of suffocation, please keep the outer plastic packaging away from children & pets.    Cold Therapy Instructions  Place Gel Bags in a freezer set ¾ of the way to max temperature for at least (4) hours. For best results, lay the Gel Bags flat and [id number] in the freezer. Once frozen, slide Gel Bags into the gel pouch and secure your wrap to the affected area with the straps.  Gel wraps that have been stored in a freezer for an extended period of time may require a (10) minute period of softening up in a room temperature environment before application.  The gel pouch acts as a protective barrier. NEVER place frozen bags directly onto skin, as this may cause frostbite injury.  The SMI Cold Therapy Wrap is designed to be able to be worm while ambulating. The compression straps can be secured well enough so that the Wrap won't  fall off while moving.  Wrap Application Videos can be viewed at [email or web address]  An additional protective barrier such as clothing, a washcloth, hand-towel or pillowcase may be used during prolonged treatment applications.  The Gel-Pouch and Wrap are both Latex-Free and the Gel Bag ingredients are non toxic.    [facility] Wrap care instructions  The [facility] Cold Therapy Wrap may be hand washed and hung to dry when needed.    [facility] re-order information  Additional [facility] body specific wraps and/or Gel Bags can be re-ordered from [email or web address] or call [email or web address] ([phone])

## 2024-02-28 NOTE — H&P
Patient Name: Renata Lynn  MRN: [id number]  : 1950  DOS: 2024    Attending: Mateo Burris,*    Primary Care Provider: Anuj Sarah DO      Chief complaint: Left knee Pain.    Subjective   Patient is a pleasant 73 y.o. female presented for scheduled surgery by Dr. Cooley.    Patient has suffered with left knee pain for several years, she has failed conservative management and has opted to proceed with surgery.    Today she underwent left total knee arthroplasty under spinal anesthesia, tolerated surgery well, is admitted for further management.  Adductor canal nerve block catheter was placed by acute pain service.    Seen in PACU postop, doing fairly well, good pain control, no complains of nausea, vomiting, or shortness of breath.    Reviewed with patient her extensive past medical history including but not limited to paroxysmal A-fib with chronic anticoagulation, hypertension dyslipidemia, sleep apnea for which she is on CPAP, diabetes mellitus.  She has been on Ozempic for about a year.  Has lost about 12 pounds.  Does not check her blood glucose regularly at home.    Her Eliquis is on hold for surgery    Allergies   Allergen Reactions    Penicillins Anaphylaxis and Swelling       Meds:  Medications Prior to Admission   Medication Sig Dispense Refill Last Dose    atorvastatin (LIPITOR) 10 MG tablet Take 1 tablet by mouth every night at bedtime. 90 tablet 1 Past Week    B Complex Vitamins (VITAMIN B COMPLEX PO) Take 1 tablet by mouth Daily.   Past Week    calcium carbonate (OS-SHANICE) 600 MG tablet Take 2 tablets by mouth Daily.   Past Week    carvedilol (COREG) 25 MG tablet Take 1 tablet by mouth 2 (Two) Times a Day. 180 tablet 1 2024 at 0600    chlorhexidine (HIBICLENS) 4 % external liquid Shower with solution for 5 days before surgery. 237 mL 0 2024    dilTIAZem XR (Dilt-XR) 120 MG 24 hr capsule Take 1 capsule by mouth Daily. Needs lab work for further refills.  90 capsule 0 2/27/2024    esomeprazole (nexIUM) 20 MG capsule Take 1 capsule by mouth Every Morning Before Breakfast.   Past Week    flecainide (TAMBOCOR) 50 MG tablet Take 1 tablet by mouth 2 (Two) Times a Day. 180 tablet 1 2/28/2024 at 0600    glucosamine-chondroitin 500-400 MG capsule capsule Take  by mouth 2 (Two) Times a Day.   2/28/2024 at 0600    hydroCHLOROthiazide (HYDRODIURIL) 25 MG tablet Take 1 tablet by mouth Daily. 90 tablet 1 2/28/2024 at 0600    metFORMIN (GLUCOPHAGE) 1000 MG tablet Take 1 tablet by mouth 2 (Two) Times a Day With Meals. 180 tablet 3 2/27/2024    montelukast (SINGULAIR) 10 MG tablet Take 1 tablet by mouth Daily. 90 tablet 1 2/28/2024 at 0600    multivitamin with minerals tablet tablet Take 1 tablet by mouth Daily.   Past Week    Tyrvaya 0.03 MG/ACT solution INSTILL 1 SPRAY IN EACH NOSTRIL TWICE DAILY APPROXIMATELY 12 HOURS APART   2/27/2024    valACYclovir (VALTREX) 1000 MG tablet Take 1 tablet by mouth Daily for 3 days as Needed. 30 tablet 0 2/27/2024    valsartan (DIOVAN) 320 MG tablet Take 1 tablet by mouth Daily. 90 tablet 0 2/27/2024    Vascepa 1 g capsule capsule Take 2 capsules (2 grams) by mouth 2 (Two) Times a Day. 360 capsule 0 2/27/2024    vitamin D3 125 MCG (5000 UT) capsule capsule Take 1 capsule by mouth Daily.   2/27/2024    vitamin E 100 UNIT capsule Take 2 capsules by mouth Daily.   Past Week    Zinc Sulfate (ZINC-220 PO) Take 1 tablet by mouth Daily.   2/27/2024    Accu-Chek FastClix Lancets misc Test blood glucose daily and as needed. 300 each 3     albuterol sulfate  (90 Base) MCG/ACT inhaler Inhale 2 puffs Every 4 (Four) Hours As Needed.   More than a month    apixaban (Eliquis) 5 MG tablet tablet Take 1 tablet by mouth Every 12 (Twelve) Hours. 180 tablet 0 2/24/2024    Blood Glucose Monitoring Suppl (True Metrix Meter) w/Device kit Use to check blood sugar daily as directed by provider 1 kit 0     Continuous Blood Gluc Sensor (FreeStyle Terry 3 Sensor)  misc Change Every 14 (Fourteen) Days. 2 each 11     glucose blood (Accu-Chek Guide) test strip Test blood glucose daily and as needed. 300 each 3     Semaglutide, 2 MG/DOSE, (Ozempic, 2 MG/DOSE,) 8 MG/3ML solution pen-injector Inject 2 mg under the skin into the appropriate area as directed 1 (One) Time Per Week. (Patient taking differently: Inject 2 mg under the skin into the appropriate area as directed 1 (One) Time Per Week. Sundays) 3 mL 0 2/25/2024        Past Medical History:   Diagnosis Date    Asthma ?  2022    Listed on hospital release form    Atrial fibrillation Oct  27, 2023    Basal cell carcinoma     face    CTS (carpal tunnel syndrome)     GERD (gastroesophageal reflux disease)     Hypertensive disorder     Mixed hyperlipidemia     Obesity     body mass index 30+-obesity    Rotator cuff syndrome 01/15/24    Currently in pt    Sleep apnea 2000 ?    CPAP nightly    Tear of meniscus of knee 2020    Surgery scheduled 02/28/24    Type 2 diabetes mellitus, uncontrolled      Past Surgical History:   Procedure Laterality Date    APPENDECTOMY      CARPAL TUNNEL RELEASE Bilateral 2004    CATARACT EXTRACTION, BILATERAL      COLONOSCOPY      FOOT SURGERY Right 2003    hammer toe surgery    HAND SURGERY      SKIN CANCER EXCISION      22 years ago-basal cell carcinoma removed off of face    WISDOM TOOTH EXTRACTION       Family History   Problem Relation Age of Onset    Breast cancer Mother     Cancer Mother     Heart disease Father     Rheumatic fever Father     Heart attack Sister     Diabetes Sister     Thyroid disease Sister     Skin cancer Sister     No Known Problems Brother     Breast cancer Maternal Grandmother 89    Diabetes Maternal Grandfather     Alzheimer's disease Paternal Grandmother     No Known Problems Paternal Grandfather     Heart attack Sister     Cancer Sister      Social History     Tobacco Use    Smoking status: Former     Packs/day: 1.00     Years: 20.00     Additional pack years: 0.00      "Total pack years: 20.00     Types: Cigarettes     Quit date:      Years since quittin.1     Passive exposure: Past    Smokeless tobacco: Never    Tobacco comments:     Started late teens stopped late 30s   Vaping Use    Vaping Use: Never used   Substance Use Topics    Alcohol use: Not Currently     Comment: occasional    Drug use: Never       Review of Systems  Pertinent items are noted in HPI    Vital Signs  /69 (BP Location: Left arm, Patient Position: Lying)   Pulse 65   Temp 96.6 °F (35.9 °C) (Axillary)   Resp 18   SpO2 94%     Physical Exam:    General Appearance:    Alert, cooperative, in no acute distress   Head:    Normocephalic, without obvious abnormality, atraumatic   Eyes:            Lids and lashes normal, conjunctivae and sclerae normal, no   icterus, no pallor, corneas clear    Ears:    Ears appear intact with no abnormalities noted   Throat:   No oral lesions, no thrush, oral mucosa moist   Neck:   No adenopathy, supple, trachea midline, no thyromegaly         Lungs:     Clear to auscultation,respirations regular, even and                   unlabored    Heart:    Regular rhythm and normal rate, normal S1 and S2, no       murmur, no gallop   Abdomen:     Normal bowel sounds, no masses, no organomegaly, soft        non-tender, non-distended, no guarding, no rebound                 tenderness   Genitalia:    Deferred   Extremities: Left LE, CDI dressing on knee, PNB cath present.    Pulses:   Pulses palpable and equal bilaterally   Skin:   No bleeding, bruising or rash   Neurologic:   Cranial nerves 2 - 12 grossly intact, decreased movement and sensation in the lower extremities when seen due to effects of spinal anesthesia      I reviewed the patient's new clinical results.             Invalid input(s): \"NEUTOPHILPCT\"  Results from last 7 days   Lab Units 24  0859   POTASSIUM mmol/L 4.2     Lab Results   Component Value Date    HGBA1C 7.50 (H) 02/15/2024      Latest " Reference Range & Units 02/15/24 08:35   Sodium 136 - 145 mmol/L 139   Potassium 3.5 - 5.2 mmol/L 4.5   Chloride 98 - 107 mmol/L 99   CO2 22.0 - 29.0 mmol/L 28.0   Anion Gap 5.0 - 15.0 mmol/L 12.0   BUN 8 - 23 mg/dL 18   Creatinine 0.57 - 1.00 mg/dL 0.90   BUN/Creatinine Ratio 7.0 - 25.0  20.0   eGFR >60.0 mL/min/1.73 68.1   Glucose 65 - 99 mg/dL 279 (H)   Calcium 8.6 - 10.5 mg/dL 10.0   Alkaline Phosphatase 39 - 117 U/L 95   Total Protein 6.0 - 8.5 g/dL 7.8   Albumin 3.5 - 5.2 g/dL 4.8   Globulin gm/dL 3.0   A/G Ratio g/dL 1.6   AST (SGOT) 1 - 32 U/L 47 (H)   ALT (SGPT) 1 - 33 U/L 48 (H)   Total Bilirubin 0.0 - 1.2 mg/dL 0.3   (H): Data is abnormally high     Latest Reference Range & Units 02/15/24 08:35   WBC 3.40 - 10.80 10*3/mm3 7.14   RBC 3.77 - 5.28 10*6/mm3 4.57   Hemoglobin 12.0 - 15.9 g/dL 13.0   Hematocrit 34.0 - 46.6 % 40.8   Platelets 140 - 450 10*3/mm3 268   RDW 12.3 - 15.4 % 14.2   MCV 79.0 - 97.0 fL 89.3   MCH 26.6 - 33.0 pg 28.4   MCHC 31.5 - 35.7 g/dL 31.9   MPV 6.0 - 12.0 fL 10.3   RDW-SD 37.0 - 54.0 fl 46.0       Assessment and Plan:       S/P TKR (total knee replacement), left    Uncontrolled type 2 diabetes mellitus with hyperglycemia    Benign hypertension    Mixed hyperlipidemia    Primary osteoarthritis of left knee    Paroxysmal atrial fibrillation    Chronic anticoagulation    Obesity (BMI 30-39.9)      Plan  1. PT/OT,  Weight bearing as tolerated left LE  2. Pain control-prns, ACB cath with ropivacaine infusion.  3. IS-encourage  4. DVT proph- Mechanicals and Eliquis starting at 2.5 mg twice daily on postop day 1 and after 5 days increase to home dose of 5 mg twice daily  5. Bowel regimen  6. Resume home medications as appropriate  7. Monitor post-op labs  8. DC planning for home    - Hypertension:  Resume home medications as appropriate, formulary substitution when indicated.  Holding parameters.  Prn medications for elevated blood pressure.    -Dyslipidemia:  Resume home regimen statin (  formulary substitution when appropriate).    -DM type 2  Hgb A1C 7.5  Hold OHA as appropriate  FSBG AC/HS, ( q 6 when NPO), along with correction humalog.  Long acting insulin,.    -CLEMENTE:  Continue CPAP.   Monitor O2 sats.    -History of paroxysmal A-fib:  Resume beta-blocker and calcium channel blocker.  Eliquis resumption as noted above.    Dragon disclaimer:  Part of this encounter note is an electronic transcription/translation of spoken language to printed text. The electronic translation of spoken language may permit erroneous, or at times, nonsensical words or phrases to be inadvertently transcribed; Although I have reviewed the note for such errors, some may still exist.    Mónica Mcclain MD  02/28/24  16:29 EST

## 2024-02-28 NOTE — PLAN OF CARE
Goal Outcome Evaluation:  Plan of Care Reviewed With: patient, spouse, daughter        Progress: no change  Outcome Evaluation: Pt amb 40' with FWW and CGAx2. Slow gait speed and decreased weight acceptance onto LLE. No knee buckling or LOB observed. Activity limited by N/V. HEP and precautions reviewed with pt. Frequent ambulation encouraged. Recommend d/c home with assist and HHPT when medically appropriate. Pt would benefit from additional PT tomorrow to address mobility deficits and assess stairs.      Anticipated Discharge Disposition (PT): home with assist, home with home health

## 2024-02-28 NOTE — ANESTHESIA PROCEDURE NOTES
Spinal Block      Patient reassessed immediately prior to procedure    Patient location during procedure: OR  Indication:at surgeon's request  Performed By  CRNA/CAA: Yeison Dorsey CRNA  Preanesthetic Checklist  Completed: patient identified, IV checked, site marked, risks and benefits discussed, surgical consent, monitors and equipment checked, pre-op evaluation and timeout performed  Spinal Block Prep:  Patient Position:sitting  Sterile Tech:cap, gloves, sterile barriers and mask  Prep:Chloraprep  Patient Monitoring:blood pressure monitoring, continuous pulse oximetry and EKG    Spinal Block Procedure  Approach:midline  Guidance:landmark technique and palpation technique  Location:L4-L5  Needle Type:Quincke  Needle Gauge:22 G  Placement of Spinal needle event:cerebrospinal fluid aspirated  Paresthesia: no  Fluid Appearance:clear  Medications: bupivacaine (MARCAINE) 0.5 % injection - Injection   2 mL - 2/28/2024 11:13:00 AM   Post Assessment  Patient Tolerance:patient tolerated the procedure well with no apparent complications  Complications no  Additional Notes  Procedure:  Pt assisted to sitting position, with legs in position of comfort over side of bed.  Pt. instructed in optimal spine presentation, the spine was prepped/ Draped and the skin at insertion site was anesthetized with 1% Lidocaine 2 ml.  The spinal needle was then advanced until CSF flow was obtained and LA was injected:

## 2024-02-28 NOTE — ANESTHESIA PREPROCEDURE EVALUATION
Anesthesia Evaluation                  Airway   Mallampati: II  TM distance: >3 FB  Neck ROM: full  No difficulty expected  Dental - normal exam     Pulmonary - normal exam   (+) a smoker Former, asthma,sleep apnea  Cardiovascular - normal exam    (+) hypertension, dysrhythmias Atrial Fib, hyperlipidemia      Neuro/Psych  (+) numbness  GI/Hepatic/Renal/Endo    (+) obesity, GERD, diabetes mellitus    Musculoskeletal     Abdominal  - normal exam    Bowel sounds: normal.   Substance History      OB/GYN          Other   arthritis,   history of cancer (SKIN)                Anesthesia Plan    ASA 3     spinal     (ADDUCTOR CANAL CATHETER FOR POSTOP PAIN)  intravenous induction     Anesthetic plan, risks, benefits, and alternatives have been provided, discussed and informed consent has been obtained with: patient.    Plan discussed with CRNA.    CODE STATUS:

## 2024-02-28 NOTE — H&P
Pre-Op H&P  Renata Lynn  [id number]  1950      Chief complaint: Left knee pain      Subjective:  Patient is a 73 y.o.female presents for scheduled surgery by Dr. Burris. She anticipates a TOTAL KNEE ARTHROPLASTY WITH MAURILIO RODRIGUEZ - LEFT - Left today.  The patient endorses having left knee pain for the past several years.  The pain is present when sitting and with movement.  Steroid shots helped to alleviate the pain for a period of time, but then they stopped working.  She denies the use of a walker or assistive device to ambulate.    + cardiac clearance in epic from 2/21/24 from Dr. Jacobson.  The last dose of Eliquis was on 2/24/24.    Review of Systems:  Constitutional-- No fever, chills or sweats. No fatigue.  CV-- No chest pain, palpitation or syncope. +HTN, HLD.  Resp-- No SOB, cough, hemoptysis. + CLEMENTE w/ CPAP use.  Skin--No rashes or lesions      Allergies:   Allergies   Allergen Reactions    Penicillins Anaphylaxis and Swelling         Home Meds:  Medications Prior to Admission   Medication Sig Dispense Refill Last Dose    atorvastatin (LIPITOR) 10 MG tablet Take 1 tablet by mouth every night at bedtime. 90 tablet 1 Past Week    B Complex Vitamins (VITAMIN B COMPLEX PO) Take 1 tablet by mouth Daily.   Past Week    calcium carbonate (OS-SHANICE) 600 MG tablet Take 2 tablets by mouth Daily.   Past Week    carvedilol (COREG) 25 MG tablet Take 1 tablet by mouth 2 (Two) Times a Day. 180 tablet 1 2/28/2024 at 0600    chlorhexidine (HIBICLENS) 4 % external liquid Shower with solution for 5 days before surgery. 237 mL 0 2/27/2024    dilTIAZem XR (Dilt-XR) 120 MG 24 hr capsule Take 1 capsule by mouth Daily. Needs lab work for further refills. 90 capsule 0 2/27/2024    esomeprazole (nexIUM) 20 MG capsule Take 1 capsule by mouth Every Morning Before Breakfast.   Past Week    flecainide (TAMBOCOR) 50 MG tablet Take 1 tablet by mouth 2 (Two) Times a Day. 180 tablet 1 2/28/2024 at 0600    glucosamine-chondroitin  500-400 MG capsule capsule Take  by mouth 2 (Two) Times a Day.   2/28/2024 at 0600    hydroCHLOROthiazide (HYDRODIURIL) 25 MG tablet Take 1 tablet by mouth Daily. 90 tablet 1 2/28/2024 at 0600    metFORMIN (GLUCOPHAGE) 1000 MG tablet Take 1 tablet by mouth 2 (Two) Times a Day With Meals. 180 tablet 3 2/27/2024    montelukast (SINGULAIR) 10 MG tablet Take 1 tablet by mouth Daily. 90 tablet 1 2/28/2024 at 0600    multivitamin with minerals tablet tablet Take 1 tablet by mouth Daily.   Past Week    Tyrvaya 0.03 MG/ACT solution INSTILL 1 SPRAY IN EACH NOSTRIL TWICE DAILY APPROXIMATELY 12 HOURS APART   2/27/2024    valACYclovir (VALTREX) 1000 MG tablet Take 1 tablet by mouth Daily for 3 days as Needed. 30 tablet 0 2/27/2024    valsartan (DIOVAN) 320 MG tablet Take 1 tablet by mouth Daily. 90 tablet 0 2/27/2024    Vascepa 1 g capsule capsule Take 2 capsules (2 grams) by mouth 2 (Two) Times a Day. 360 capsule 0 2/27/2024    vitamin D3 125 MCG (5000 UT) capsule capsule Take 1 capsule by mouth Daily.   2/27/2024    vitamin E 100 UNIT capsule Take 2 capsules by mouth Daily.   Past Week    Zinc Sulfate (ZINC-220 PO) Take 1 tablet by mouth Daily.   2/27/2024    Accu-Chek FastClix Lancets misc Test blood glucose daily and as needed. 300 each 3     albuterol sulfate  (90 Base) MCG/ACT inhaler Inhale 2 puffs Every 4 (Four) Hours As Needed.   More than a month    apixaban (Eliquis) 5 MG tablet tablet Take 1 tablet by mouth Every 12 (Twelve) Hours. 180 tablet 0 2/24/2024    Blood Glucose Monitoring Suppl (True Metrix Meter) w/Device kit Use to check blood sugar daily as directed by provider 1 kit 0     Continuous Blood Gluc Sensor (FreeStyle Terry 3 Sensor) misc Change Every 14 (Fourteen) Days. 2 each 11     glucose blood (Accu-Chek Guide) test strip Test blood glucose daily and as needed. 300 each 3     Semaglutide, 2 MG/DOSE, (Ozempic, 2 MG/DOSE,) 8 MG/3ML solution pen-injector Inject 2 mg under the skin into the  appropriate area as directed 1 (One) Time Per Week. (Patient taking differently: Inject 2 mg under the skin into the appropriate area as directed 1 (One) Time Per Week. Sundays) 3 mL 0 2024         PMH:   Past Medical History:   Diagnosis Date    Asthma ?      Listed on hospital release form    Atrial fibrillation Oct  27, 2023    Basal cell carcinoma     face    CTS (carpal tunnel syndrome)     GERD (gastroesophageal reflux disease)     Hypertensive disorder     Mixed hyperlipidemia     Obesity     body mass index 30+-obesity    Rotator cuff syndrome 01/15/24    Currently in pt    Sleep apnea  ?    CPAP nightly    Tear of meniscus of knee     Surgery scheduled 24    Type 2 diabetes mellitus, uncontrolled      PSH:    Past Surgical History:   Procedure Laterality Date    APPENDECTOMY      CARPAL TUNNEL RELEASE Bilateral     CATARACT EXTRACTION, BILATERAL      COLONOSCOPY      FOOT SURGERY Right     hammer toe surgery    HAND SURGERY      SKIN CANCER EXCISION      22 years ago-basal cell carcinoma removed off of face    WISDOM TOOTH EXTRACTION         Immunization History:  Influenza: No  Pneumococcal: No  Tetanus: No  Covid : x4    Social History:   Tobacco:   Social History     Tobacco Use   Smoking Status Former    Packs/day: 1.00    Years: 20.00    Additional pack years: 0.00    Total pack years: 20.00    Types: Cigarettes    Quit date:     Years since quittin.1    Passive exposure: Past   Smokeless Tobacco Never   Tobacco Comments    Started late teens stopped late 30s      Alcohol:     Social History     Substance and Sexual Activity   Alcohol Use Not Currently    Comment: occasional         Physical Exam:/62 (BP Location: Right arm, Patient Position: Lying)   Pulse 72   Temp 97.7 °F (36.5 °C) (Temporal)   Resp 16   SpO2 96%       General Appearance:    Alert, cooperative, no distress, appears stated age   Head:    Normocephalic, without obvious abnormality,  atraumatic   Lungs:     Clear to auscultation bilaterally, respirations unlabored    Heart:   Regular rate and rhythm, S1 and S2 normal. +Murmur    Abdomen:    Soft without tenderness   Extremities:   Extremities normal, atraumatic, no cyanosis or edema   Skin:   Skin color, texture, turgor normal, no rashes or lesions   Neurologic:   Grossly intact     Results Review:     LABS:  Lab Results   Component Value Date    WBC 7.14 02/15/2024    HGB 13.0 02/15/2024    HCT 40.8 02/15/2024    MCV 89.3 02/15/2024     02/15/2024    NEUTROABS 3.68 02/15/2024    GLUCOSE 279 (H) 02/15/2024    BUN 18 02/15/2024    CREATININE 0.90 02/15/2024    EGFRIFNONA 78 08/04/2021    EGFRIFAFRI 94 08/04/2021     02/15/2024    K 4.5 02/15/2024    CL 99 02/15/2024    CO2 28.0 02/15/2024    MG 1.5 (L) 09/27/2022    CALCIUM 10.0 02/15/2024    ALBUMIN 4.8 02/15/2024    AST 47 (H) 02/15/2024    ALT 48 (H) 02/15/2024    BILITOT 0.3 02/15/2024       RADIOLOGY:  Imaging Results (Last 72 Hours)       ** No results found for the last 72 hours. **            I reviewed the patient's new clinical results.    Cancer Staging (if applicable)  Cancer Patient: _x_ yes __no __unknown; If yes, clinical stage T:__ N:__M:__, stage group or __N/A      Impression: Primary osteoarthritis of left knee      Plan: TOTAL KNEE ARTHROPLASTY WITH MAURILIO ROBOT - LEFT - Left       Jason Hylton, APRN   2/28/2024   09:00 EST

## 2024-02-28 NOTE — ANESTHESIA POSTPROCEDURE EVALUATION
Patient: Renata Lynn    Procedure Summary       Date: 02/28/24 Room / Location:  [facility]    Anesthesia Start: 1107 Anesthesia Stop: 1419    Procedure: TOTAL KNEE ARTHROPLASTY WITH MAURILIO ROBOT - LEFT (Left: Knee) Diagnosis:       Primary osteoarthritis of left knee      (Primary osteoarthritis of left knee [M17.12])    Surgeons: Mateo Burris MD Provider: Kevin Singer MD    Anesthesia Type: spinal ASA Status: 3            Anesthesia Type: spinal    Vitals  Vitals Value Taken Time   BP     Temp     Pulse 64 02/28/24 1419   Resp     SpO2 93 % 02/28/24 1419   Vitals shown include unfiled device data.        Post Anesthesia Care and Evaluation    Patient location during evaluation: PACU  Patient participation: complete - patient participated  Level of consciousness: awake and alert  Pain management: adequate    Airway patency: patent  Anesthetic complications: No anesthetic complications  PONV Status: none  Cardiovascular status: hemodynamically stable and acceptable  Respiratory status: nonlabored ventilation, acceptable and nasal cannula  Hydration status: acceptable

## 2024-02-28 NOTE — OP NOTE
Orthopaedics Operative Report    PREOPERATIVE DIAGNOSIS: Primary osteoarthritis left knee    POSTOPERATIVE DIAGNOSIS: Same    PROCEDURE PERFORMED: Left total knee arthroplasty using Lila robot, CPT 71646, [id number]    SURGEON: Mateo Burris MD    ANESTHESIA:  Spinal    STAFF:  Circulator: Kaylynn Porter RN; Teri Stevenson RN  Physician Assistant: Doris Jaime PA-C  Scrub Person: Grant Liang; Tash Landaverde  Vendor Representative: Juno Pettit  Assistant: Sarah Wan PA-C    TOURNIQUET TIME: 88 minutes    ESTIMATED BLOOD LOSS: 50 mL    COMPLICATIONS: None apparent.    RELEASES: None required after approach and bone cuts made and osteophytes removed    Surgical Approach: Knee Medial Parapatellar     TXA: IV    IMPLANTS:     Implant Name Type Inv. Item Serial No.  Lot No. LRB No. Used Action   DEV CONTRL TISS STRATAFIX SYMM PDS PLUS TARIK CT-1 45CM - [id number] Implant DEV CONTRL TISS STRATAFIX SYMM PDS PLUS TARIK CT-1 45CM  [employer]  [employer] Left 1 Implanted   BASEPLT TIB/KN OSSEOTI PERSONA KEEL CMTLS 0DEG TAYLOR LT - [id number] Implant BASEPLT TIB/KN OSSEOTI PERSONA KEEL CMTLS 0DEG TAYLOR LT  [employer] [id number] Left 1 Implanted   PAT NXGN POR [id number] - [id number] Implant PAT NXGN POR [id number]  [employer] [id number] Left 1 Implanted   COMP FEM/KN PERSONA CR POR COCR NRW SZ7 LT - [id number] Implant COMP FEM/KN PERSONA CR POR COCR NRW SZ7 LT  [employer] [id number] Left 1 Implanted   ART/SRF KN PERSONA/VE PS EF 6TO7 10MM LT - [id number] Implant ART/SRF KN PERSONA/VE PS EF 6TO7 10MM LT  [employer] [id number] Left 1 Implanted       Regine Persona CR TM femur size 7 narrow  size E 0 degree Dexter-Ti tibia  32 TM patella button   Size 10 Vitamin E Medial Congruent highly crosslinked polyethylene articular surface    PREOPERATIVE ANTIBIOTICS: Kefzol    REFERRING PHYSICIAN: Anuj Sarah MD    INDICATIONS: Failure of nonoperative treatment including injections,  bracing, and activity modification.    DESCRIPTION OF PROCEDURE: After informed consent was obtained, the patient was taken to the operating room. The patient was given a dose of IV antibiotics prior to incision.After the smooth induction of spinal anesthesia, the patient’s left lower extremity was prepped and draped in the usual fashion for this type of procedure. We performed a timeout to verify site and the procedure to be performed.  We began with exsanguination of the left lower extremity using an Esmarch bandage and inflation of tourniquet to 300 mmHg. We made our standard midline incision and medial parapatellar approach. We took 20% of the quadriceps tendon medially and a sleeve of tissue around the patella for repair as well a sliver of patellar tendon. Dissected extra-ostially but subperiosteally on the medial side taking off the superficial and deep MCL from the proximal tibia. These were protected throughout the procedure. Visible medial meniscus was excised. The retropatellar fat pad was excised. The ACL and visible lateral meniscus was excised as well as the synovium from the anterior surface of the distal femur.  Osteophytes were removed from the distal femur and proximal tibia at this point.            We then turned our attention to placement of our pins for the femoral and tibial trackers.  The femoral trackers were placed within the incision about 2 to 3 fingerbreadths from the articular cartilage on the medial aspect of the femur.  The tibial trackers were placed through percutaneous incisions on the tibia.  Once these were placed, we obtained our hip center and then we then registered our data points on the femur and tibia.  Once these were registered, we took the knee through a range of motion and assessed our medial and lateral gaps at 0, 45, and 90 degrees. The 90 degree gap was obtained using a Bowers elevator.  At this point, we then created our surgical plan.   Extension gaps medially and  laterally were 20.5 and 21.0 mm.  Flexion gaps medially and laterally were 19.5 and 22.5 mm.  Patient had a preoperative 1 degree valgus deformity.  We were able to correct them to 1 degree of valgus alignment.  Preop range of motion was -3 to 133.  We placed 1.5 degrees of valgus alignment on the distal femoral cut and 0.5 degrees of valgus on the proximal tibial cut.  Distal femoral cut was made at 12.5 mm. Proximal tibial cut was made at 10.0 mm.        At this point the robotic arm was brought in for the distal femoral cut.  This was made in collaborative mode and then validated.  We then sized the femur to verify the size of the femoral component to be appropriate.  We then brought the robotic arm back into pin our external rotation.  Our 4-in-1 block was then placed and we assessed for possible notching as well as for the size of the posterior condylar cuts.  The 4-in-1 cut was made without difficulty and the excess bone removed.  We then brought the robotic arm back in to make our proximal tibia cut.  Retractors were placed and the bone cut was made again in collaborative mode which was validated both for amount of bone taken off and for slope.  We then used our blocks and checked our extension and flexion gaps which were felt to be acceptable.  We then took off posterior osteophytes off the posterior medial posterior lateral femur.  We completed preparation of our tibia and femur and then trialed and a size 10 seemed to have the best stability and range of motion parameters.  We then everted the patella and this was resurfaced, restoring patellar height before trials were placed to protect the bone. The patellar height was 24 mm preoperatively and we cut the patella to 14 mm and sized the patella to a 32.  The lughole for the press-fit implant was eventually drilled and the component slightly medialized. The bone was then thoroughly irrigated and felt to be appropriate for press-fit implants.      We then  injected our periarticular injection into the posterior medial aspect of the knee which consisted of 20 ml of NS, 20 ml of 0.5% lidocaine with epi, 20 ml of 0.5% bupivicaine, 30 mg of toradol, and 8 mg of morphine. We implanted these press-fit implants in place and had excellent purchase.  We then deflated the tourniquet prior to placement of our final polyethylene spacer. Any bleeding seen in the back of the knee was controlled and we obtained hemostasis. The final spacer was then secured into place. After the implants were placed and a size 10 polyethylene implant was placed, the final range of motion was -7.5 to 141 degrees. We then used a final irrigation consisting of Irricept and diluted Betadine throughout the knee.  Trackers and pins were then removed.  We then closed our medial parapatellar approach using 0 Ethibond in an interrupted fashion.  We then closed our subcutaneous layer using running 2-0 Vicryl and interrupted 2-0 Vicryl. We closed our skin using a running 3-0 Monocryl. We placed an Exofin Fusion dressing system over the incision and took down the drapes. The patient was transferred back to their hospital bed and then taken to the recovery room in stable condition. All counts were correct postoperatively. I performed the case.      First assistant: Sarah Wan PA-C    The skilled assistance of the above noted first assistant was necessary during this complex surgical procedure.  The surgical assistant assisted with every aspect of the operation including, but not limited to, proper and safe positioning of the patient, obtaining adequate surgical exposure, manipulation of surgical instruments to make the proper bone cuts, cementing of the final implants, the continual process of hemostasis during the procedure itself in addition to surgical wound closure and removal of the patient from the operating table and returning the patient back to the [facility].  The assistance of the  surgical assistant allowed me to perform the most sensitive and technical potions of this operation using 2 hands, thus enhancing efficiency and patient safety.  This would not be possible without the help of a skilled assistant familiar with the procedure and capable of safely performing the aforementioned tasks.       POSTOPERATIVE PLAN:  1. Weight bearing as tolerated left lower extremity.  2. The patient will receive an indwelling low femoral nerve block in the recovery room.  3.  Patient will receive IV antibiotics on the floor for 24 hours  4.  Patient will be given Eliquis for DVT prophylaxis starting tomorrow morning and continuing for 6 weeks.  5.  The patient will begin physical therapy  6.  Will be under the care of the hospitalist service and Dr. ABREU  7.  Patient will be discharged home with a prescription for oxycodone, doxycycline for 1 week, Tylenol, Colace, and Zofran in addition to their DVT prophylaxis  8.  Follow up with me in the office in 3 weeks    Mateo Burris M.D.*

## 2024-02-28 NOTE — PLAN OF CARE
Problem: Adult Inpatient Plan of Care  Goal: Plan of Care Review  Outcome: Ongoing, Progressing  Flowsheets (Taken 2/28/2024 1830)  Progress: no change  Plan of Care Reviewed With: patient  Goal: Patient-Specific Goal (Individualized)  Outcome: Ongoing, Progressing  Goal: Absence of Hospital-Acquired Illness or Injury  Outcome: Ongoing, Progressing  Intervention: Identify and Manage Fall Risk  Recent Flowsheet Documentation  Taken 2/28/2024 1818 by Penelope Mayes RN  Safety Promotion/Fall Prevention:   activity supervised   assistive device/personal items within reach   clutter free environment maintained   room organization consistent   safety round/check completed   toileting scheduled  Taken 2/28/2024 1617 by Penelope Mayes RN  Safety Promotion/Fall Prevention:   activity supervised   assistive device/personal items within reach   clutter free environment maintained   room organization consistent   safety round/check completed   toileting scheduled  Intervention: Prevent Skin Injury  Recent Flowsheet Documentation  Taken 2/28/2024 1818 by Penelope Mayes RN  Body Position: legs elevated  Taken 2/28/2024 1617 by Penelope Mayes RN  Body Position: sitting up in bed  Intervention: Prevent and Manage VTE (Venous Thromboembolism) Risk  Recent Flowsheet Documentation  Taken 2/28/2024 1818 by Penelope Mayes RN  Activity Management: up in chair  VTE Prevention/Management:   right   sequential compression devices on  Taken 2/28/2024 1617 by Penelope Mayes RN  Activity Management: activity encouraged  VTE Prevention/Management:   sequential compression devices on   right  Intervention: Prevent Infection  Recent Flowsheet Documentation  Taken 2/28/2024 1818 by Penelope Mayes RN  Infection Prevention:   environmental surveillance performed   rest/sleep promoted  Taken 2/28/2024 1617 by Penelope Mayes RN  Infection Prevention:   environmental surveillance performed   rest/sleep promoted  Goal: Optimal  Comfort and Wellbeing  Outcome: Ongoing, Progressing  Intervention: Provide Person-Centered Care  Recent Flowsheet Documentation  Taken 2/28/2024 1617 by Penelope Mayes RN  Trust Relationship/Rapport: care explained  Goal: Readiness for Transition of Care  Outcome: Ongoing, Progressing  Intervention: Mutually Develop Transition Plan  Recent Flowsheet Documentation  Taken 2/28/2024 1623 by Penelope Mayes RN  Transportation Anticipated: family or friend will provide  Transportation Concerns: none  Patient/Family Anticipated Services at Transition:   Patient/Family Anticipates Transition to: home with family     Problem: Diabetes Comorbidity  Goal: Blood Glucose Level Within Targeted Range  Outcome: Ongoing, Progressing     Problem: Obstructive Sleep Apnea Risk or Actual Comorbidity Management  Goal: Unobstructed Breathing During Sleep  Outcome: Ongoing, Progressing     Problem: Adjustment to Surgery (Knee Arthroplasty)  Goal: Optimal Coping  Outcome: Ongoing, Progressing     Problem: Bleeding (Knee Arthroplasty)  Goal: Absence of Bleeding  Outcome: Ongoing, Progressing     Problem: Bowel Motility Impaired (Knee Arthroplasty)  Goal: Effective Bowel Elimination  Outcome: Ongoing, Progressing     Problem: Fluid and Electrolyte Imbalance (Knee Arthroplasty)  Goal: Fluid and Electrolyte Balance  Outcome: Ongoing, Progressing     Problem: Functional Ability Impaired (Knee Arthroplasty)  Goal: Optimal Functional Ability  Outcome: Ongoing, Progressing  Intervention: Promote Optimal Functional Status  Recent Flowsheet Documentation  Taken 2/28/2024 1818 by Penelope Mayes, RN  Activity Management: up in chair  Taken 2/28/2024 1617 by Penelope Mayes RN  Activity Management: activity encouraged     Problem: Infection (Knee Arthroplasty)  Goal: Absence of Infection Signs and Symptoms  Outcome: Ongoing, Progressing  Intervention: Prevent or Manage Infection  Recent Flowsheet Documentation  Taken 2/28/2024  1818 by Penelope Mayes RN  Infection Prevention:   environmental surveillance performed   rest/sleep promoted  Taken 2/28/2024 1617 by Penelope Mayes RN  Infection Prevention:   environmental surveillance performed   rest/sleep promoted     Problem: Neurovascular Compromise (Knee Arthroplasty)  Goal: Intact Neurovascular Status  Outcome: Ongoing, Progressing     Problem: Ongoing Anesthesia Effects (Knee Arthroplasty)  Goal: Anesthesia/Sedation Recovery  Outcome: Ongoing, Progressing  Intervention: Optimize Anesthesia Recovery  Recent Flowsheet Documentation  Taken 2/28/2024 1818 by Penelope Mayes RN  Safety Promotion/Fall Prevention:   activity supervised   assistive device/personal items within reach   clutter free environment maintained   room organization consistent   safety round/check completed   toileting scheduled  Administration (IS): self-administered  Taken 2/28/2024 1617 by Penelope Mayes RN  Safety Promotion/Fall Prevention:   activity supervised   assistive device/personal items within reach   clutter free environment maintained   room organization consistent   safety round/check completed   toileting scheduled  Administration (IS):   instruction provided, initial   self-administered     Problem: Pain (Knee Arthroplasty)  Goal: Acceptable Pain Control  Outcome: Ongoing, Progressing     Problem: Postoperative Nausea and Vomiting (Knee Arthroplasty)  Goal: Nausea and Vomiting Relief  Outcome: Ongoing, Progressing     Problem: Postoperative Urinary Retention (Knee Arthroplasty)  Goal: Effective Urinary Elimination  Outcome: Ongoing, Progressing     Problem: Respiratory Compromise (Knee Arthroplasty)  Goal: Effective Oxygenation and Ventilation  Outcome: Ongoing, Progressing  Intervention: Optimize Oxygenation and Ventilation  Recent Flowsheet Documentation  Taken 2/28/2024 1818 by Penelope Mayes RN  Administration (IS): self-administered  Taken 2/28/2024 1617 by Penelope Mayes  RN  Administration (IS):   instruction provided, initial   self-administered  Head of Bed (HOB) Positioning: HOB at 30-45 degrees   Goal Outcome Evaluation:  Plan of Care Reviewed With: patient        Progress: no change            Pt alert and oriented x4. VSS. RA. NS with 20k infusing @ 50ml/hr. Infublock dressing CDI. ACE wrap present on left knee. Pt mobility limited with nausea. PRN zofran administered as ordered. PRN pain medication administered as ordered. Pt would like to d/c home tomorrow

## 2024-02-28 NOTE — ANESTHESIA PROCEDURE NOTES
LEFt Adductor canal cath      Patient reassessed immediately prior to procedure    Reason for block: at surgeon's request and post-op pain management  Performed by  CRNA/CAA: Tremaine Alcala, CRNA  Assisted by: Mayi Peterson RN  Preanesthetic Checklist  Completed: patient identified, IV checked, site marked, risks and benefits discussed, surgical consent, monitors and equipment checked, pre-op evaluation and timeout performed  Prep:  Pt Position: supine  Sterile barriers:cap, gloves, mask, sterile barriers and washed/disinfected hands  Prep: ChloraPrep  Patient monitoring: blood pressure monitoring, continuous pulse oximetry and EKG  Procedure    Sedation: no  Performed under: spinal  Guidance:ultrasound guided    ULTRASOUND INTERPRETATION.  Using ultrasound guidance a 20 G gauge needle was placed in close proximity to the nerve, at which point, under ultrasound guidance anesthetic was injected in the area of the nerve and spread of the anesthesia was seen on ultrasound in close proximity thereto.  There were no abnormalities seen on ultrasound; a digital image was taken; and the patient tolerated the procedure with no complications. Images:still images obtained, printed/placed on chart    Laterality:left  Block Type:adductor canal block  Injection Technique:catheter  Needle Type:Tuohy and echogenic  Needle Gauge:18 G  Resistance on Injection: none  Catheter Size:20 G (20g)  Cath Depth at skin: 12 cm    Medications Used: bupivacaine PF (MARCAINE) 0.25 % injection - Injection   20 mL - 2/28/2024 2:09:00 PM      Post Assessment  Injection Assessment: negative aspiration for heme, incremental injection and no paresthesia on injection  Patient Tolerance:comfortable throughout block  Complications:no  Additional Notes  CATHETER   A high-frequency linear transducer, with sterile cover, was placed on the anterior mid-thigh (between the anterior superior iliac spine and patella). The transducer was then moved medially  "to identify the Sartorius muscle (Jessica), Vastus Medialis muscle (VMM), Superficial Femoral Artery (SFA) and Vein. The transducer was then moved cephalad or caudad to position the SFA in the middle of the Jessica. The insertion site was prepped and draped in sterile fashion. Skin and cutaneous tissue was infiltrated with 2-5 ml of 1% Lidocaine. Using ultrasound-guidance, an 18-gauge Skycrossiplex Ultra 360 Touhy needle was advanced in plane from lateral to medial. Preservative-free normal saline was utilized for hydro-dissection of tissue, advancement of Touhy, and to confirm needle placement below the fascial plane of the Jessica where the Nerve to the VMM is located. Local anesthetic (LA) 5 ml deposited here. The Touhy needle continues its path lateral to the SFA at the level of the Saphenous Nerve. The remainder of the LA was deposited at the 10-11 o'clock position of the SFA. This injection created a space between the Jessica and the SFA. Aspiration every 5 ml to prevent intravascular injection. Injection was completed with negative aspiration of blood and negative intravascular injection. Injection pressures were normal with minimal resistance. A 20-gauge Skycrossiplex Echo catheter was placed through the needle and advance out the tip of the Touhy 3-5 cm anterior to the SFA. The Touhy needle was then removed, and final catheter position verified at the 12 o'clock position to the SFA. The catheter was secured in the usual fashion with skin glue, benzoin, steri-strips, CHG tegaderm and label noting \"Nerve Block Catheter\". Jerk tape applied at yellow connector and catheter connection.             "

## 2024-02-29 LAB
ANION GAP SERPL CALCULATED.3IONS-SCNC: 9 MMOL/L (ref 5–15)
BASOPHILS # BLD AUTO: 0.03 10*3/MM3 (ref 0–0.2)
BASOPHILS NFR BLD AUTO: 0.4 % (ref 0–1.5)
BUN SERPL-MCNC: 15 MG/DL (ref 8–23)
BUN/CREAT SERPL: 19 (ref 7–25)
CALCIUM SPEC-SCNC: 8.6 MG/DL (ref 8.6–10.5)
CHLORIDE SERPL-SCNC: 104 MMOL/L (ref 98–107)
CO2 SERPL-SCNC: 25 MMOL/L (ref 22–29)
CREAT SERPL-MCNC: 0.79 MG/DL (ref 0.57–1)
DEPRECATED RDW RBC AUTO: 49.1 FL (ref 37–54)
EGFRCR SERPLBLD CKD-EPI 2021: 79.6 ML/MIN/1.73
EOSINOPHIL # BLD AUTO: 0.1 10*3/MM3 (ref 0–0.4)
EOSINOPHIL NFR BLD AUTO: 1.2 % (ref 0.3–6.2)
ERYTHROCYTE [DISTWIDTH] IN BLOOD BY AUTOMATED COUNT: 14.6 % (ref 12.3–15.4)
GLUCOSE BLDC GLUCOMTR-MCNC: 174 MG/DL (ref 70–130)
GLUCOSE BLDC GLUCOMTR-MCNC: 205 MG/DL (ref 70–130)
GLUCOSE BLDC GLUCOMTR-MCNC: 269 MG/DL (ref 70–130)
GLUCOSE BLDC GLUCOMTR-MCNC: 311 MG/DL (ref 70–130)
GLUCOSE SERPL-MCNC: 143 MG/DL (ref 65–99)
HCT VFR BLD AUTO: 32.5 % (ref 34–46.6)
HGB BLD-MCNC: 10.4 G/DL (ref 12–15.9)
IMM GRANULOCYTES # BLD AUTO: 0.02 10*3/MM3 (ref 0–0.05)
IMM GRANULOCYTES NFR BLD AUTO: 0.2 % (ref 0–0.5)
LYMPHOCYTES # BLD AUTO: 1.97 10*3/MM3 (ref 0.7–3.1)
LYMPHOCYTES NFR BLD AUTO: 24.2 % (ref 19.6–45.3)
MCH RBC QN AUTO: 29.4 PG (ref 26.6–33)
MCHC RBC AUTO-ENTMCNC: 32 G/DL (ref 31.5–35.7)
MCV RBC AUTO: 91.8 FL (ref 79–97)
MONOCYTES # BLD AUTO: 0.79 10*3/MM3 (ref 0.1–0.9)
MONOCYTES NFR BLD AUTO: 9.7 % (ref 5–12)
NEUTROPHILS NFR BLD AUTO: 5.23 10*3/MM3 (ref 1.7–7)
NEUTROPHILS NFR BLD AUTO: 64.3 % (ref 42.7–76)
NRBC BLD AUTO-RTO: 0 /100 WBC (ref 0–0.2)
PLATELET # BLD AUTO: 153 10*3/MM3 (ref 140–450)
PMV BLD AUTO: 10.7 FL (ref 6–12)
POTASSIUM SERPL-SCNC: 4.4 MMOL/L (ref 3.5–5.2)
RBC # BLD AUTO: 3.54 10*6/MM3 (ref 3.77–5.28)
SODIUM SERPL-SCNC: 138 MMOL/L (ref 136–145)
WBC NRBC COR # BLD AUTO: 8.14 10*3/MM3 (ref 3.4–10.8)

## 2024-02-29 PROCEDURE — A9270 NON-COVERED ITEM OR SERVICE: HCPCS | Performed by: INTERNAL MEDICINE

## 2024-02-29 PROCEDURE — 63710000001 INSULIN LISPRO (HUMAN) PER 5 UNITS: Performed by: INTERNAL MEDICINE

## 2024-02-29 PROCEDURE — A9270 NON-COVERED ITEM OR SERVICE: HCPCS | Performed by: ORTHOPAEDIC SURGERY

## 2024-02-29 PROCEDURE — 63710000001 OXYCODONE 5 MG TABLET: Performed by: ORTHOPAEDIC SURGERY

## 2024-02-29 PROCEDURE — 25010000002 SODIUM CHLORIDE 0.9 % WITH KCL 20 MEQ 20-0.9 MEQ/L-% SOLUTION: Performed by: ORTHOPAEDIC SURGERY

## 2024-02-29 PROCEDURE — 97110 THERAPEUTIC EXERCISES: CPT

## 2024-02-29 PROCEDURE — 99024 POSTOP FOLLOW-UP VISIT: CPT | Performed by: ORTHOPAEDIC SURGERY

## 2024-02-29 PROCEDURE — 63710000001 VALSARTAN 160 MG TABLET: Performed by: INTERNAL MEDICINE

## 2024-02-29 PROCEDURE — 63710000001 ACETAMINOPHEN EXTRA STRENGTH 500 MG TABLET: Performed by: ORTHOPAEDIC SURGERY

## 2024-02-29 PROCEDURE — 63710000001 MECLIZINE 25 MG TABLET: Performed by: INTERNAL MEDICINE

## 2024-02-29 PROCEDURE — 63710000001 DILTIAZEM CD 120 MG CAPSULE SUSTAINED-RELEASE 24 HR: Performed by: INTERNAL MEDICINE

## 2024-02-29 PROCEDURE — 25010000002 CEFAZOLIN PER 500 MG

## 2024-02-29 PROCEDURE — 63710000001 APIXABAN 2.5 MG TABLET: Performed by: ORTHOPAEDIC SURGERY

## 2024-02-29 PROCEDURE — 63710000001 ATORVASTATIN 10 MG TABLET: Performed by: INTERNAL MEDICINE

## 2024-02-29 PROCEDURE — 97166 OT EVAL MOD COMPLEX 45 MIN: CPT

## 2024-02-29 PROCEDURE — 63710000001 CARVEDILOL 12.5 MG TABLET: Performed by: INTERNAL MEDICINE

## 2024-02-29 PROCEDURE — 63710000001 MELOXICAM 15 MG TABLET: Performed by: ORTHOPAEDIC SURGERY

## 2024-02-29 PROCEDURE — 97535 SELF CARE MNGMENT TRAINING: CPT

## 2024-02-29 PROCEDURE — 85025 COMPLETE CBC W/AUTO DIFF WBC: CPT | Performed by: ORTHOPAEDIC SURGERY

## 2024-02-29 PROCEDURE — 63710000001 INSULIN DETEMIR PER 5 UNITS: Performed by: INTERNAL MEDICINE

## 2024-02-29 PROCEDURE — 82948 REAGENT STRIP/BLOOD GLUCOSE: CPT

## 2024-02-29 PROCEDURE — 97116 GAIT TRAINING THERAPY: CPT

## 2024-02-29 PROCEDURE — 25010000002 CEFAZOLIN PER 500 MG: Performed by: ORTHOPAEDIC SURGERY

## 2024-02-29 PROCEDURE — 63710000001 ONDANSETRON ODT 4 MG TABLET DISPERSIBLE: Performed by: ORTHOPAEDIC SURGERY

## 2024-02-29 PROCEDURE — 63710000001 PANTOPRAZOLE 40 MG TABLET DELAYED-RELEASE: Performed by: INTERNAL MEDICINE

## 2024-02-29 PROCEDURE — 80048 BASIC METABOLIC PNL TOTAL CA: CPT | Performed by: ORTHOPAEDIC SURGERY

## 2024-02-29 RX ORDER — CEFAZOLIN SODIUM 1 G/3ML
1 INJECTION, POWDER, FOR SOLUTION INTRAMUSCULAR; INTRAVENOUS EVERY 8 HOURS
Status: DISCONTINUED | OUTPATIENT
Start: 2024-02-29 | End: 2024-02-29

## 2024-02-29 RX ORDER — MECLIZINE HYDROCHLORIDE 25 MG/1
25 TABLET ORAL 3 TIMES DAILY PRN
Status: DISCONTINUED | OUTPATIENT
Start: 2024-02-29 | End: 2024-03-01 | Stop reason: HOSPADM

## 2024-02-29 RX ORDER — NITROGLYCERIN 0.4 MG/1
0.4 TABLET SUBLINGUAL
Status: DISCONTINUED | OUTPATIENT
Start: 2024-02-29 | End: 2024-03-01 | Stop reason: HOSPADM

## 2024-02-29 RX ADMIN — ATORVASTATIN CALCIUM 10 MG: 10 TABLET, FILM COATED ORAL at 21:41

## 2024-02-29 RX ADMIN — ACETAMINOPHEN 1000 MG: 500 TABLET ORAL at 18:19

## 2024-02-29 RX ADMIN — CEFAZOLIN 1000 MG: 1 INJECTION, POWDER, FOR SOLUTION INTRAMUSCULAR; INTRAVENOUS at 17:31

## 2024-02-29 RX ADMIN — ACETAMINOPHEN 1000 MG: 500 TABLET ORAL at 05:45

## 2024-02-29 RX ADMIN — SODIUM CHLORIDE 2000 MG: 900 INJECTION INTRAVENOUS at 01:09

## 2024-02-29 RX ADMIN — DILTIAZEM HYDROCHLORIDE 120 MG: 120 CAPSULE, EXTENDED RELEASE ORAL at 09:56

## 2024-02-29 RX ADMIN — OXYCODONE HYDROCHLORIDE 5 MG: 5 TABLET ORAL at 09:55

## 2024-02-29 RX ADMIN — INSULIN LISPRO 4 UNITS: 100 INJECTION, SOLUTION INTRAVENOUS; SUBCUTANEOUS at 21:41

## 2024-02-29 RX ADMIN — APIXABAN 2.5 MG: 2.5 TABLET, FILM COATED ORAL at 09:56

## 2024-02-29 RX ADMIN — ACETAMINOPHEN 1000 MG: 500 TABLET ORAL at 12:12

## 2024-02-29 RX ADMIN — PANTOPRAZOLE SODIUM 40 MG: 40 TABLET, DELAYED RELEASE ORAL at 05:46

## 2024-02-29 RX ADMIN — POTASSIUM CHLORIDE AND SODIUM CHLORIDE 50 ML/HR: 900; 150 INJECTION, SOLUTION INTRAVENOUS at 12:16

## 2024-02-29 RX ADMIN — OXYCODONE HYDROCHLORIDE 5 MG: 5 TABLET ORAL at 05:46

## 2024-02-29 RX ADMIN — INSULIN LISPRO 5 UNITS: 100 INJECTION, SOLUTION INTRAVENOUS; SUBCUTANEOUS at 17:27

## 2024-02-29 RX ADMIN — CEFAZOLIN 1000 MG: 1 INJECTION, POWDER, FOR SOLUTION INTRAMUSCULAR; INTRAVENOUS at 10:43

## 2024-02-29 RX ADMIN — ONDANSETRON 4 MG: 4 TABLET, ORALLY DISINTEGRATING ORAL at 07:59

## 2024-02-29 RX ADMIN — OXYCODONE HYDROCHLORIDE 5 MG: 5 TABLET ORAL at 18:21

## 2024-02-29 RX ADMIN — MELOXICAM 15 MG: 15 TABLET ORAL at 09:57

## 2024-02-29 RX ADMIN — CARVEDILOL 25 MG: 12.5 TABLET, FILM COATED ORAL at 21:41

## 2024-02-29 RX ADMIN — Medication 3 ML: at 21:41

## 2024-02-29 RX ADMIN — INSULIN LISPRO 3 UNITS: 100 INJECTION, SOLUTION INTRAVENOUS; SUBCUTANEOUS at 12:12

## 2024-02-29 RX ADMIN — INSULIN DETEMIR 10 UNITS: 100 INJECTION, SOLUTION SUBCUTANEOUS at 21:41

## 2024-02-29 RX ADMIN — MECLIZINE HYDROCHLORIDE 25 MG: 25 TABLET ORAL at 21:41

## 2024-02-29 RX ADMIN — APIXABAN 2.5 MG: 2.5 TABLET, FILM COATED ORAL at 21:41

## 2024-02-29 RX ADMIN — CARVEDILOL 25 MG: 12.5 TABLET, FILM COATED ORAL at 09:57

## 2024-02-29 RX ADMIN — MECLIZINE HYDROCHLORIDE 25 MG: 25 TABLET ORAL at 13:03

## 2024-02-29 NOTE — PLAN OF CARE
Goal Outcome Evaluation:  Plan of Care Reviewed With: patient        Progress: no change  Outcome Evaluation: Patient's mobility continues to be affected by nausea and vertigo like feeling. She was able to partivipate in all therapeutic exercise      Anticipated Discharge Disposition (PT): home with assist, home with home health

## 2024-02-29 NOTE — THERAPY TREATMENT NOTE
Patient Name: Renata Lynn  : 1950    MRN: [id number]                              Today's Date: 2024       Admit Date: 2024    Visit Dx:     ICD-10-CM ICD-9-CM   1. Primary osteoarthritis of left knee  M17.12 715.16     Patient Active Problem List   Diagnosis    Uncontrolled type 2 diabetes mellitus with hyperglycemia    Benign hypertension    Mixed hyperlipidemia    Primary osteoarthritis of left knee    Paroxysmal atrial fibrillation    S/P TKR (total knee replacement), left    Chronic anticoagulation    Obesity (BMI 30-39.9)     Past Medical History:   Diagnosis Date    Asthma ?      Listed on hospital release form    Atrial fibrillation Oct  27, 2023    Basal cell carcinoma     face    CTS (carpal tunnel syndrome)     GERD (gastroesophageal reflux disease)     Hypertensive disorder     Mixed hyperlipidemia     Obesity     body mass index 30+-obesity    Rotator cuff syndrome 01/15/24    Currently in pt    Sleep apnea  ?    CPAP nightly    Tear of meniscus of knee     Surgery scheduled 24    Type 2 diabetes mellitus, uncontrolled      Past Surgical History:   Procedure Laterality Date    APPENDECTOMY      CARPAL TUNNEL RELEASE Bilateral     CATARACT EXTRACTION, BILATERAL      COLONOSCOPY      FOOT SURGERY Right     hammer toe surgery    HAND SURGERY      SKIN CANCER EXCISION      22 years ago-basal cell carcinoma removed off of face    TOTAL KNEE ARTHROPLASTY Left 2024    Procedure: TOTAL KNEE ARTHROPLASTY WITH MAURILIO ROBOT - LEFT;  Surgeon: Mateo Burris MD;  Location: [facility];  Service: Robotics - Ortho;  Laterality: Left;    WISDOM TOOTH EXTRACTION        General Information       Row Name 24 1545 24 1124       Physical Therapy Time and Intention    Document Type therapy note (daily note)  -SC --    Mode of Treatment physical therapy  -SC physical therapy  -SC      Row Name 24 1545 24 1124       General Information     Patient Profile Reviewed yes  -SC yes  -SC    Existing Precautions/Restrictions fall;other (see comments)  nerve cath  -SC fall;other (see comments)  nerve cath  -SC      Row Name 02/29/24 1545 02/29/24 1124       Cognition    Orientation Status (Cognition) oriented x 4  -SC oriented x 4  -SC      Row Name 02/29/24 1545 02/29/24 1124       Safety Issues, Functional Mobility    Impairments Affecting Function (Mobility) balance;endurance/activity tolerance;pain;strength;range of motion (ROM)  -SC balance;endurance/activity tolerance;pain;strength;range of motion (ROM)  -SC    Comment, Safety Issues/Impairments (Mobility) alert, following commands  -SC alert, following commands  -SC              User Key  (r) = Recorded By, (t) = Taken By, (c) = Cosigned By      Initials Name Provider Type    SC Isaias Nunez, PT Physical Therapist                   Mobility       Row Name 02/29/24 1545 02/29/24 1125       Bed Mobility    Comment, (Bed Mobility) uic  -SC uic  -SC      Row Name 02/29/24 1545 02/29/24 1125       Transfers    Comment, (Transfers) cues for sliding leg out when sitting. Transfered on/off recliner and commode  -SC unable due to c/o vertigo and nausea  -SC      Row Name 02/29/24 1545 02/29/24 1125       Sit-Stand Transfer    Sit-Stand [place] (Transfers) verbal cues;standby assist;1 person assist  -SC --    Assistive Device (Sit-Stand Transfers) walker, front-wheeled  -SC --    Comment, (Sit-Stand Transfer) -- deferred  -SC      Row Name 02/29/24 1545 02/29/24 1125       Gait/Stairs (Locomotion)    [place] Level (Gait) 1 person assist;contact guard;verbal cues  -SC --    Assistive Device (Gait) walker, front-wheeled  -SC --    Distance in Feet (Gait) 240  -SC --    Deviations/Abnormal Patterns (Gait) left sided deviations;stride length decreased;weight shifting decreased;antalgic;magy decreased  -SC --    Bilateral Gait Deviations forward flexed posture  -SC --    Comment, (Gait/Stairs) Gt  training focused on achieving step through gait pattern. Encouraged full wt shifting over L leg with heel strike. Demonstrated improving technique with time. limited by pain  -SC deferred due to nausea and vertigo  -SC      Row Name 02/29/24 1545 02/29/24 1125       Mobility    Extremity Weight-bearing Status left lower extremity  -SC left lower extremity  -SC    Left Lower Extremity (Weight-bearing Status) weight-bearing as tolerated (WBAT)  -SC weight-bearing as tolerated (WBAT)  -SC              User Key  (r) = Recorded By, (t) = Taken By, (c) = Cosigned By      Initials Name Provider Type    SC Isaias Nunez, PT Physical Therapist                   Obj/Interventions       [facility] Name 02/29/24 1550 02/29/24 1128       Motor Skills    Therapeutic Exercise knee  -SC knee;ankle  -[facility] Name 02/29/24 1550 02/29/24 1128       Knee (Therapeutic Exercise)    Knee (Therapeutic Exercise) -- strengthening exercise;isometric exercises  -SC    Knee Isometrics (Therapeutic Exercise) left;quad sets;10 repetitions  -SC left;quad sets;sitting;10 repetitions  -SC    Knee Strengthening (Therapeutic Exercise) left;heel slides;SLR (straight leg raise);SAQ (short arc quad);LAQ (long arc quad);10 repetitions  -SC left;SLR (straight leg raise);SAQ (short arc quad);heel slides;LAQ (long arc quad);10 repetitions  -[facility] Name 02/29/24 1550 02/29/24 1128       Ankle (Therapeutic Exercise)    Ankle (Therapeutic Exercise) AROM (active range of motion);strengthening exercise  -SC AROM (active range of motion)  -SC    Ankle AROM (Therapeutic Exercise) left;dorsiflexion;plantarflexion  -SC bilateral;dorsiflexion;plantarflexion;20 repititions  -SC    Ankle Isometrics (Therapeutic Exercise) left;dorsiflexion;plantarflexion;sitting  -SC --      [facility] Name 02/29/24 1550          Balance    Balance Assessment standing static balance  -SC     Dynamic Standing Balance 1-person assist;contact guard  -SC     Position/Device Used, Standing  Balance supported;walker, rolling  -SC     Comment, Balance unsteady  -SC               User Key  (r) = Recorded By, (t) = Taken By, (c) = Cosigned By      Initials Name Provider Type    SC Isaias Nunez, PT Physical Therapist                   Goals/Plan    No documentation.                  Clinical Impression       Row Name 02/29/24 1551          Pain    Additional Documentation Pain Scale: FACES Pre/Post-Treatment (Group)  -[facility] Name 02/29/24 1551 02/29/24 1128       Pain Scale: FACES Pre/Post-Treatment    Pain: FACES Scale, Pretreatment 4-->hurts little more  -SC 4-->hurts little more  -SC    Posttreatment Pain Rating 6-->hurts even more  -SC 4-->hurts little more  -SC    Pain Location - Side/Orientation Left  -SC Left  -SC    Pain Location - knee  -SC knee  -[facility] Name 02/29/24 1551 02/29/24 1128       Plan of Care Review    Plan of Care Reviewed With patient  -SC patient  -SC    Progress improving  -SC no change  -SC    Outcome Evaluation Patient with improving nausea and vertigo. Demonstrated improving strength and mobility, increasing her distance in the hallway.  -SC Patient's mobility continues to be affected by nausea and vertigo like feeling. She was able to partivipate in all therapeutic exercise  -[facility] Name 02/29/24 1551 02/29/24 1128       Therapy Assessment/Plan (PT)    Rehab Potential (PT) good, to achieve stated therapy goals  -SC good, to achieve stated therapy goals  -SC    Criteria for Skilled Interventions Met (PT) yes;meets criteria;skilled treatment is necessary  -SC yes;meets criteria;skilled treatment is necessary  -SC    Therapy Frequency (PT) 2 times/day  -SC 2 times/day  -[facility] Name 02/29/24 1551          Vital Signs    Posttreatment Heart Rate (beats/min) 72  -SC     Post SpO2 (%) 94  -[facility] Name 02/29/24 1551 02/29/24 1128       Positioning and Restraints    Pre-Treatment Position sitting in chair/recliner  -SC sitting in chair/recliner  -SC    Post  Treatment Position chair  -SC chair  -SC    In Chair notified nsg;call light within reach;encouraged to call for assist;exit alarm on  -SC notified nsg;reclined;encouraged to call for assist;call light within reach;sitting;exit alarm on;with family/caregiver  -SC              User Key  (r) = Recorded By, (t) = Taken By, (c) = Cosigned By      Initials Name Provider Type    SC Isaias Nunez PT Physical Therapist                   Outcome Measures       Row Name 02/29/24 1553 02/29/24 1130       How much help from another person do you currently need...    Turning from your back to your side while in flat bed without using bedrails? 3  -SC 3  -SC    Moving from lying on back to sitting on the side of a flat bed without bedrails? 3  -SC 3  -SC    Moving to and from a bed to a chair (including a wheelchair)? 3  -SC 3  -SC    Standing up from a chair using your arms (e.g., wheelchair, bedside chair)? 3  -SC 3  -SC    Climbing 3-5 steps with a railing? 3  -SC 3  -SC    To walk in hospital room? 3  -SC 3  -SC    AM-PAC 6 Clicks Score (PT) 18  -SC 18  -SC    Highest Level of Mobility Goal 6 --> Walk 10 steps or more  -SC 6 --> Walk 10 steps or more  -SC      Row Name 02/29/24 1553 02/29/24 1130       Functional Assessment    Outcome Measure Options AM-PAC 6 Clicks Basic Mobility (PT)  -SC AM-PAC 6 Clicks Basic Mobility (PT)  -SC      Row Name 02/29/24 0914          Functional Assessment    Outcome Measure Options AM-PAC 6 Clicks Daily Activity (OT)  -               User Key  (r) = Recorded By, (t) = Taken By, (c) = Cosigned By      Initials Name Provider Type    SC Isaias Nunez PT Physical Therapist    Milka Cook OT Occupational Therapist                                 Physical Therapy Education       Title: PT OT SLP Therapies (In Progress)       Topic: Physical Therapy (Done)       Point: Mobility training (Done)       Learning Progress Summary             Patient JOSR Ingram, VU by SC at 2/29/2024  1553    Comment: reviewed HEP    Eager, E, VU by SC at 2/29/2024 1130    Comment: reviewed HEp    Acceptance, E,D, VU,DU by  at 2/28/2024 1815                         Point: Home exercise program (Done)       Learning Progress Summary             Patient Eager, E, VU by SC at 2/29/2024 1553    Comment: reviewed HEP    Eager, E, VU by SC at 2/29/2024 1130    Comment: reviewed HEp    Acceptance, E,D, VU,DU by  at 2/28/2024 1815                         Point: Body mechanics (Done)       Learning Progress Summary             Patient Eager, E, VU by SC at 2/29/2024 1553    Comment: reviewed HEP    Eager, E, VU by SC at 2/29/2024 1130    Comment: reviewed HEp    Acceptance, E,D, VU,DU by  at 2/28/2024 1815                         Point: Precautions (Done)       Learning Progress Summary             Patient Eager, E, VU by SC at 2/29/2024 1553    Comment: reviewed HEP    Eager, E, VU by SC at 2/29/2024 1130    Comment: reviewed HEp    Acceptance, E,D, VU,DU by  at 2/28/2024 1815                                         User Key       Initials Effective Dates Name Provider Type Discipline    SC 02/03/23 -  Isaias Nunez, PT Physical Therapist PT     12/15/23 -  Zenaida Morrow, CHRISTOPHER Physical Therapist PT                  PT Recommendation and Plan     Plan of Care Reviewed With: patient  Progress: improving  Outcome Evaluation: Patient with improving nausea and vertigo. Demonstrated improving strength and mobility, increasing her distance in the hallway.     Time Calculation:         PT Charges       Row Name 02/29/24 1503 02/29/24 1105          Time Calculation    Start Time 1503  -SC 1105  -SC     PT Received On 02/29/24  -SC 02/29/24  -SC     PT Goal Re-Cert Due Date 03/09/24  -SC 03/09/24  -SC        Timed Charges    49309 - PT Therapeutic Exercise Minutes 20  -SC 16  -SC     86619 - Gait Training Minutes  15  -SC --        Total Minutes    Timed Charges Total Minutes 35  -SC 16  -SC      Total Minutes 35  -SC 16   -SC               User Key  (r) = Recorded By, (t) = Taken By, (c) = Cosigned By      Initials Name Provider Type    SC Isaias Nunez PT Physical Therapist                  Therapy Charges for Today       Code Description Service Date Service Provider Modifiers Qty    84593979941  PT THER PROC EA 15 MIN 2/29/2024 Mayra, Isaias DANIELSON, PT GP 1    18162877764 HC PT THER PROC EA 15 MIN 2/29/2024 Mayra, Isaias DANIELSON, PT GP 1    41793959971 HC GAIT TRAINING EA 15 MIN 2/29/2024 Isaisa Nunez, PT GP 1            PT G-Codes  Outcome Measure Options: AM-PAC 6 Clicks Basic Mobility (PT)  AM-PAC 6 Clicks Score (PT): 18  AM-PAC 6 Clicks Score (OT): 16  PT Discharge Summary  Anticipated Discharge Disposition (PT): home with assist, home with home health    Isaias Nunez, PT  2/29/2024

## 2024-02-29 NOTE — PLAN OF CARE
Goal Outcome Evaluation:  Plan of Care Reviewed With: patient        Progress: improving  Outcome Evaluation: Patient with improving nausea and vertigo. Demonstrated improving strength and mobility, increasing her distance in the hallway.      Anticipated Discharge Disposition (PT): home with assist, home with home health

## 2024-02-29 NOTE — PROGRESS NOTES
"          Orthopaedic Surgery Progress Note      LOS: 0 days   Patient Care Team:  Anuj Sarah DO as PCP - General (Internal Medicine)  Leroy Whipple MD as Consulting Physician (Endocrinology)  Babak Jacobson III, MD as Cardiologist (Cardiology)  Ishmael Roberto APRN as Nurse Practitioner (Cardiology)    POD 1    Subjective     Interval History:   Patient had some lightheadedness with ambulation yesterday.  Has had nausea and vomiting yesterday and this morning.  Spinal anesthetic yesterday.  Patient on Ozempic.    Objective     Vital Signs:  Temp (24hrs), Av.7 °F (36.5 °C), Min:96.6 °F (35.9 °C), Max:98.9 °F (37.2 °C)    /63 (BP Location: Left arm, Patient Position: Lying)   Pulse 78   Temp 98.9 °F (37.2 °C) (Oral)   Resp 18   Ht 167.6 cm (66\")   Wt 93.3 kg (205 lb 11 oz)   SpO2 98%   BMI 33.20 kg/m²     Labs:  Lab Results (last 24 hours)       Procedure Component Value Units Date/Time    POC Glucose Once [[id number]]  (Abnormal) Collected: 24    Specimen: Blood Updated: 24     Glucose 174 mg/dL     CBC & Differential [[id number]]  (Abnormal) Collected: 24    Specimen: Blood Updated: 24    Narrative:      The following orders were created for panel order CBC & Differential.  Procedure                               Abnormality         Status                     ---------                               -----------         ------                     CBC Auto Differential[[id number]]        Abnormal            Final result                 Please view results for these tests on the individual orders.    CBC Auto Differential [[id number]]  (Abnormal) Collected: 24    Specimen: Blood Updated: 24     WBC 8.14 10*3/mm3      RBC 3.54 10*6/mm3      Hemoglobin 10.4 g/dL      Hematocrit 32.5 %      MCV 91.8 fL      MCH 29.4 pg      MCHC 32.0 g/dL      RDW 14.6 %      RDW-SD 49.1 fl      MPV 10.7 fL      Platelets 153 10*3/mm3     "  Neutrophil % 64.3 %      Lymphocyte % 24.2 %      Monocyte % 9.7 %      Eosinophil % 1.2 %      Basophil % 0.4 %      Immature Grans % 0.2 %      Neutrophils, Absolute 5.23 10*3/mm3      Lymphocytes, Absolute 1.97 10*3/mm3      Monocytes, Absolute 0.79 10*3/mm3      Eosinophils, Absolute 0.10 10*3/mm3      Basophils, Absolute 0.03 10*3/mm3      Immature Grans, Absolute 0.02 10*3/mm3      nRBC 0.0 /100 WBC     Basic Metabolic Panel [[id number]]  (Abnormal) Collected: 02/29/24 0359    Specimen: Blood Updated: 02/29/24 0525     Glucose 143 mg/dL      BUN 15 mg/dL      Creatinine 0.79 mg/dL      Sodium 138 mmol/L      Potassium 4.4 mmol/L      Comment: Slight hemolysis detected by analyzer. Result may be falsely elevated.        Chloride 104 mmol/L      CO2 25.0 mmol/L      Calcium 8.6 mg/dL      BUN/Creatinine Ratio 19.0     Anion Gap 9.0 mmol/L      eGFR 79.6 mL/min/1.73     Narrative:      GFR Normal >60  Chronic Kidney Disease <60  Kidney Failure <15    The GFR formula is only valid for adults with stable renal function between ages 18 and 70.    POC Glucose Once [[id number]]  (Abnormal) Collected: 02/28/24 2037    Specimen: Blood Updated: 02/28/24 2038     Glucose 186 mg/dL     POC Glucose Once [[id number]]  (Abnormal) Collected: 02/28/24 1704    Specimen: Blood Updated: 02/28/24 1711     Glucose 154 mg/dL     POC Glucose Once [[id number]]  (Abnormal) Collected: 02/28/24 1438    Specimen: Blood Updated: 02/28/24 1440     Glucose 175 mg/dL     Potassium [[id number]]  (Normal) Collected: 02/28/24 0859    Specimen: Blood Updated: 02/28/24 0919     Potassium 4.2 mmol/L     POC Glucose Once [[id number]]  (Abnormal) Collected: 02/28/24 0849    Specimen: Blood Updated: 02/28/24 0850     Glucose 182 mg/dL             Physical Exam:  EHL, FHL, gastroc soleus, and tibialis anterior are intact  Toes are pink and warm  Surgical dressing is in place  Palpable dorsalis pedis pulse  Calf is soft and nontender    Postoperative x-ray  has been reviewed and looks acceptable    Assessment & Plan     Postoperative day number 1 status post left total knee arthroplasty.    Labs: Hematocrit 33, platelets 153,000, creatinine 0.79    Pain Control: Good overall    PT and OT     DVT prophylaxis: Eliquis 2.5 twice daily x 5 days then resume regular dosing    Discharge planning: Anticipate discharge home this afternoon provided nausea subsides.  She only has 1-2 steps that she needs to navigate when she gets home.  Prescriptions have been written to the [facility] retail pharmacy.    Upon discharge, patient will need follow-up with me in 3 weeks' time.  They will need [facility] at home program for physical therapy.  Standard Exofin Fusion dressing care instructions.  Do not remove.  DME per case management.    Admission Status:  I believe this patient meets INPATIENT status due to the need for care which can only be reasonably provided in a hospital setting such as aggressive/expedited ancillary services and/or consultation services, the necessity for IV medications, close physician monitoring and/or the possible need for procedures.  In such, I feel patient’s risk for adverse outcomes and need for care warrant INPATIENT evaluation and predict the patient’s care encounter to likely last beyond 2 midnights.        Mateo Burris MD  02/29/24  08:07 EST

## 2024-02-29 NOTE — THERAPY TREATMENT NOTE
Patient Name: Renata Lynn  : 1950    MRN: [id number]                              Today's Date: 2024       Admit Date: 2024    Visit Dx:     ICD-10-CM ICD-9-CM   1. Primary osteoarthritis of left knee  M17.12 715.16     Patient Active Problem List   Diagnosis    Uncontrolled type 2 diabetes mellitus with hyperglycemia    Benign hypertension    Mixed hyperlipidemia    Primary osteoarthritis of left knee    Paroxysmal atrial fibrillation    S/P TKR (total knee replacement), left    Chronic anticoagulation    Obesity (BMI 30-39.9)     Past Medical History:   Diagnosis Date    Asthma ?      Listed on hospital release form    Atrial fibrillation Oct  27, 2023    Basal cell carcinoma     face    CTS (carpal tunnel syndrome)     GERD (gastroesophageal reflux disease)     Hypertensive disorder     Mixed hyperlipidemia     Obesity     body mass index 30+-obesity    Rotator cuff syndrome 01/15/24    Currently in pt    Sleep apnea  ?    CPAP nightly    Tear of meniscus of knee     Surgery scheduled 24    Type 2 diabetes mellitus, uncontrolled      Past Surgical History:   Procedure Laterality Date    APPENDECTOMY      CARPAL TUNNEL RELEASE Bilateral     CATARACT EXTRACTION, BILATERAL      COLONOSCOPY      FOOT SURGERY Right     hammer toe surgery    HAND SURGERY      SKIN CANCER EXCISION      22 years ago-basal cell carcinoma removed off of face    TOTAL KNEE ARTHROPLASTY Left 2024    Procedure: TOTAL KNEE ARTHROPLASTY WITH MAURILIO ROBOT - LEFT;  Surgeon: Mateo Burris MD;  Location: [facility];  Service: Robotics - Ortho;  Laterality: Left;    WISDOM TOOTH EXTRACTION        General Information       Row Name 24 1545 24 1124       Physical Therapy Time and Intention    Document Type therapy note (daily note)  -SC --    Mode of Treatment physical therapy  -SC physical therapy  -SC      Row Name 24 1545 24 1124       General Information     Patient Profile Reviewed yes  -SC yes  -SC    Existing Precautions/Restrictions fall;other (see comments)  nerve cath  -SC fall;other (see comments)  nerve cath  -SC      Row Name 02/29/24 1545 02/29/24 1124       Cognition    Orientation Status (Cognition) oriented x 4  -SC oriented x 4  -SC      Row Name 02/29/24 1545 02/29/24 1124       Safety Issues, Functional Mobility    Impairments Affecting Function (Mobility) balance;endurance/activity tolerance;pain;strength;range of motion (ROM)  -SC balance;endurance/activity tolerance;pain;strength;range of motion (ROM)  -SC    Comment, Safety Issues/Impairments (Mobility) alert, following commands  -SC alert, following commands  -SC              User Key  (r) = Recorded By, (t) = Taken By, (c) = Cosigned By      Initials Name Provider Type    SC Isaias Nunez, PT Physical Therapist                   Mobility       Row Name 02/29/24 1545 02/29/24 1125       Bed Mobility    Comment, (Bed Mobility) uic  -SC uic  -SC      Row Name 02/29/24 1545 02/29/24 1125       Transfers    Comment, (Transfers) cues for sliding leg out when sitting. Transfered on/off recliner and commode  -SC unable due to c/o vertigo and nausea  -SC      Row Name 02/29/24 1545 02/29/24 1125       Sit-Stand Transfer    Sit-Stand [place] (Transfers) verbal cues;standby assist;1 person assist  -SC --    Assistive Device (Sit-Stand Transfers) walker, front-wheeled  -SC --    Comment, (Sit-Stand Transfer) -- deferred  -SC      Row Name 02/29/24 1545 02/29/24 1125       Gait/Stairs (Locomotion)    [place] Level (Gait) 1 person assist;contact guard;verbal cues  -SC --    Assistive Device (Gait) walker, front-wheeled  -SC --    Distance in Feet (Gait) 240  -SC --    Deviations/Abnormal Patterns (Gait) left sided deviations;stride length decreased;weight shifting decreased;antalgic;magy decreased  -SC --    Bilateral Gait Deviations forward flexed posture  -SC --    Comment, (Gait/Stairs) Gt  training focused on achieving step through gait pattern. Encouraged full wt shifting over L leg with heel strike. Demonstrated improving technique with time. limited by pain  -SC deferred due to nausea and vertigo  -SC      Row Name 02/29/24 1545 02/29/24 1125       Mobility    Extremity Weight-bearing Status left lower extremity  -SC left lower extremity  -SC    Left Lower Extremity (Weight-bearing Status) weight-bearing as tolerated (WBAT)  -SC weight-bearing as tolerated (WBAT)  -SC              User Key  (r) = Recorded By, (t) = Taken By, (c) = Cosigned By      Initials Name Provider Type    SC Isaias Nunez, PT Physical Therapist                   Obj/Interventions       [facility] Name 02/29/24 1550 02/29/24 1128       Motor Skills    Therapeutic Exercise knee  -SC knee;ankle  -[facility] Name 02/29/24 1550 02/29/24 1128       Knee (Therapeutic Exercise)    Knee (Therapeutic Exercise) -- strengthening exercise;isometric exercises  -SC    Knee Isometrics (Therapeutic Exercise) left;quad sets;10 repetitions  -SC left;quad sets;sitting;10 repetitions  -SC    Knee Strengthening (Therapeutic Exercise) left;heel slides;SLR (straight leg raise);SAQ (short arc quad);LAQ (long arc quad);10 repetitions  -SC left;SLR (straight leg raise);SAQ (short arc quad);heel slides;LAQ (long arc quad);10 repetitions  -[facility] Name 02/29/24 1550 02/29/24 1128       Ankle (Therapeutic Exercise)    Ankle (Therapeutic Exercise) AROM (active range of motion);strengthening exercise  -SC AROM (active range of motion)  -SC    Ankle AROM (Therapeutic Exercise) left;dorsiflexion;plantarflexion  -SC bilateral;dorsiflexion;plantarflexion;20 repititions  -SC    Ankle Isometrics (Therapeutic Exercise) left;dorsiflexion;plantarflexion;sitting  -SC --      [facility] Name 02/29/24 1550          Balance    Balance Assessment standing static balance  -SC     Dynamic Standing Balance 1-person assist;contact guard  -SC     Position/Device Used, Standing  Balance supported;walker, rolling  -SC     Comment, Balance unsteady  -SC               User Key  (r) = Recorded By, (t) = Taken By, (c) = Cosigned By      Initials Name Provider Type    SC Isaias Nunez, PT Physical Therapist                   Goals/Plan    No documentation.                  Clinical Impression       Row Name 02/29/24 1551          Pain    Additional Documentation Pain Scale: FACES Pre/Post-Treatment (Group)  -[facility] Name 02/29/24 1551 02/29/24 1128       Pain Scale: FACES Pre/Post-Treatment    Pain: FACES Scale, Pretreatment 4-->hurts little more  -SC 4-->hurts little more  -SC    Posttreatment Pain Rating 6-->hurts even more  -SC 4-->hurts little more  -SC    Pain Location - Side/Orientation Left  -SC Left  -SC    Pain Location - knee  -SC knee  -[facility] Name 02/29/24 1551 02/29/24 1128       Plan of Care Review    Plan of Care Reviewed With patient  -SC patient  -SC    Progress improving  -SC no change  -SC    Outcome Evaluation Patient with improving nausea and vertigo. Demonstrated improving strength and mobility, increasing her distance in the hallway.  -SC Patient's mobility continues to be affected by nausea and vertigo like feeling. She was able to partivipate in all therapeutic exercise  -[facility] Name 02/29/24 1551 02/29/24 1128       Therapy Assessment/Plan (PT)    Rehab Potential (PT) good, to achieve stated therapy goals  -SC good, to achieve stated therapy goals  -SC    Criteria for Skilled Interventions Met (PT) yes;meets criteria;skilled treatment is necessary  -SC yes;meets criteria;skilled treatment is necessary  -SC    Therapy Frequency (PT) 2 times/day  -SC 2 times/day  -[facility] Name 02/29/24 1551          Vital Signs    Posttreatment Heart Rate (beats/min) 72  -SC     Post SpO2 (%) 94  -[facility] Name 02/29/24 1551 02/29/24 1128       Positioning and Restraints    Pre-Treatment Position sitting in chair/recliner  -SC sitting in chair/recliner  -SC    Post  Treatment Position chair  -SC chair  -SC    In Chair notified nsg;call light within reach;encouraged to call for assist;exit alarm on  -SC notified nsg;reclined;encouraged to call for assist;call light within reach;sitting;exit alarm on;with family/caregiver  -SC              User Key  (r) = Recorded By, (t) = Taken By, (c) = Cosigned By      Initials Name Provider Type    SC Isaias Nunez PT Physical Therapist                   Outcome Measures       Row Name 02/29/24 1553 02/29/24 1130       How much help from another person do you currently need...    Turning from your back to your side while in flat bed without using bedrails? 3  -SC 3  -SC    Moving from lying on back to sitting on the side of a flat bed without bedrails? 3  -SC 3  -SC    Moving to and from a bed to a chair (including a wheelchair)? 3  -SC 3  -SC    Standing up from a chair using your arms (e.g., wheelchair, bedside chair)? 3  -SC 3  -SC    Climbing 3-5 steps with a railing? 3  -SC 3  -SC    To walk in hospital room? 3  -SC 3  -SC    AM-PAC 6 Clicks Score (PT) 18  -SC 18  -SC    Highest Level of Mobility Goal 6 --> Walk 10 steps or more  -SC 6 --> Walk 10 steps or more  -SC      Row Name 02/29/24 1553 02/29/24 1130       Functional Assessment    Outcome Measure Options AM-PAC 6 Clicks Basic Mobility (PT)  -SC AM-PAC 6 Clicks Basic Mobility (PT)  -SC      Row Name 02/29/24 0914          Functional Assessment    Outcome Measure Options AM-PAC 6 Clicks Daily Activity (OT)  -               User Key  (r) = Recorded By, (t) = Taken By, (c) = Cosigned By      Initials Name Provider Type    SC Isaias Nunez PT Physical Therapist    Milka Cook OT Occupational Therapist                                 Physical Therapy Education       Title: PT OT SLP Therapies (In Progress)       Topic: Physical Therapy (Done)       Point: Mobility training (Done)       Learning Progress Summary             Patient JOSR Ingram, VU by SC at 2/29/2024  1553    Comment: reviewed HEP    Eager, E, VU by SC at 2/29/2024 1130    Comment: reviewed HEp    Acceptance, E,D, VU,DU by  at 2/28/2024 1815                         Point: Home exercise program (Done)       Learning Progress Summary             Patient Eager, E, VU by SC at 2/29/2024 1553    Comment: reviewed HEP    Eager, E, VU by SC at 2/29/2024 1130    Comment: reviewed HEp    Acceptance, E,D, VU,DU by  at 2/28/2024 1815                         Point: Body mechanics (Done)       Learning Progress Summary             Patient Eager, E, VU by SC at 2/29/2024 1553    Comment: reviewed HEP    Eager, E, VU by SC at 2/29/2024 1130    Comment: reviewed HEp    Acceptance, E,D, VU,DU by  at 2/28/2024 1815                         Point: Precautions (Done)       Learning Progress Summary             Patient Eager, E, VU by SC at 2/29/2024 1553    Comment: reviewed HEP    Eager, E, VU by SC at 2/29/2024 1130    Comment: reviewed HEp    Acceptance, E,D, VU,DU by  at 2/28/2024 1815                                         User Key       Initials Effective Dates Name Provider Type Discipline    SC 02/03/23 -  Isaias Nunez, PT Physical Therapist PT     12/15/23 -  Zenaida Morrow, CHRISTOPHER Physical Therapist PT                  PT Recommendation and Plan     Plan of Care Reviewed With: patient  Progress: improving  Outcome Evaluation: Patient with improving nausea and vertigo. Demonstrated improving strength and mobility, increasing her distance in the hallway.     Time Calculation:         PT Charges       Row Name 02/29/24 1503 02/29/24 1105          Time Calculation    Start Time 1503  -SC 1105  -SC     PT Received On 02/29/24  -SC 02/29/24  -SC     PT Goal Re-Cert Due Date 03/09/24  -SC 03/09/24  -SC        Timed Charges    53310 - PT Therapeutic Exercise Minutes 20  -SC 16  -SC     63740 - Gait Training Minutes  15  -SC --        Total Minutes    Timed Charges Total Minutes 35  -SC 16  -SC      Total Minutes 35  -SC 16   -SC               User Key  (r) = Recorded By, (t) = Taken By, (c) = Cosigned By      Initials Name Provider Type    SC Isaias Nunez PT Physical Therapist                  Therapy Charges for Today       Code Description Service Date Service Provider Modifiers Qty    83911920460  PT THER PROC EA 15 MIN 2/29/2024 Mayra, Isaias DANIELSON, PT GP 1    28563088403 HC PT THER PROC EA 15 MIN 2/29/2024 Mayra, Isaias DANIELSON, PT GP 1    76032500047 HC GAIT TRAINING EA 15 MIN 2/29/2024 Isaias Nunez, PT GP 1            PT G-Codes  Outcome Measure Options: AM-PAC 6 Clicks Basic Mobility (PT)  AM-PAC 6 Clicks Score (PT): 18  AM-PAC 6 Clicks Score (OT): 16  PT Discharge Summary  Anticipated Discharge Disposition (PT): home with assist, home with home health    Isaias Nunez, PT  2/29/2024

## 2024-02-29 NOTE — PLAN OF CARE
Goal Outcome Evaluation:              Outcome Evaluation: Pt AAOx4 and pleasant.  Pt has had nausea and vertigo noted throughout the day.  PRN Zofran given and meclizine ordered and given.  Meclizine helped pt to ambulate in the [place] this afternoon.  Pain c/o 4-6/10 and relieved by PRN pain medication.  ALarms present and active.  Call bell within reach.

## 2024-02-29 NOTE — THERAPY TREATMENT NOTE
Patient Name: Renata Lynn  : 1950    MRN: [id number]                              Today's Date: 2024       Admit Date: 2024    Visit Dx:     ICD-10-CM ICD-9-CM   1. Primary osteoarthritis of left knee  M17.12 715.16     Patient Active Problem List   Diagnosis    Uncontrolled type 2 diabetes mellitus with hyperglycemia    Benign hypertension    Mixed hyperlipidemia    Primary osteoarthritis of left knee    Paroxysmal atrial fibrillation    S/P TKR (total knee replacement), left    Chronic anticoagulation    Obesity (BMI 30-39.9)     Past Medical History:   Diagnosis Date    Asthma ?      Listed on hospital release form    Atrial fibrillation Oct  27, 2023    Basal cell carcinoma     face    CTS (carpal tunnel syndrome)     GERD (gastroesophageal reflux disease)     Hypertensive disorder     Mixed hyperlipidemia     Obesity     body mass index 30+-obesity    Rotator cuff syndrome 01/15/24    Currently in pt    Sleep apnea  ?    CPAP nightly    Tear of meniscus of knee     Surgery scheduled 24    Type 2 diabetes mellitus, uncontrolled      Past Surgical History:   Procedure Laterality Date    APPENDECTOMY      CARPAL TUNNEL RELEASE Bilateral     CATARACT EXTRACTION, BILATERAL      COLONOSCOPY      FOOT SURGERY Right     hammer toe surgery    HAND SURGERY      SKIN CANCER EXCISION      22 years ago-basal cell carcinoma removed off of face    TOTAL KNEE ARTHROPLASTY Left 2024    Procedure: TOTAL KNEE ARTHROPLASTY WITH MAURILIO ROBOT - LEFT;  Surgeon: Mateo Burris MD;  Location: [facility];  Service: Robotics - Ortho;  Laterality: Left;    WISDOM TOOTH EXTRACTION        General Information       Row Name 24 1124          Physical Therapy Time and Intention    Mode of Treatment physical therapy  -SC       Row Name 24 1124          General Information    Patient Profile Reviewed yes  -SC     Existing Precautions/Restrictions fall;other (see comments)   nerve cath  -[facility] Name 02/29/24 1124          Cognition    Orientation Status (Cognition) oriented x 4  -[facility] Name 02/29/24 1124          Safety Issues, Functional Mobility    Impairments Affecting Function (Mobility) balance;endurance/activity tolerance;pain;strength;range of motion (ROM)  -SC     Comment, Safety Issues/Impairments (Mobility) alert, following commands  -SC               User Key  (r) = Recorded By, (t) = Taken By, (c) = Cosigned By      Initials Name Provider Type    SC Isaias Nunez PT Physical Therapist                   Mobility       [facility] Name 02/29/24 1125          Bed Mobility    Comment, (Bed Mobility) uic  -[facility] Name 02/29/24 1125          Transfers    Comment, (Transfers) unable due to c/o vertigo and nausea  -[facility] 02/29/24 1125          Sit-Stand Transfer    Comment, (Sit-Stand Transfer) deferred  -SC       Row Name 02/29/24 1125          Gait/Stairs (Locomotion)    Comment, (Gait/Stairs) deferred due to nausea and vertigo  -[facility] 02/29/24 1125          Mobility    Extremity Weight-bearing Status left lower extremity  -SC     Left Lower Extremity (Weight-bearing Status) weight-bearing as tolerated (WBAT)  -SC               User Key  (r) = Recorded By, (t) = Taken By, (c) = Cosigned By      Initials Name Provider Type    SC Isaias Nunez PT Physical Therapist                   Obj/Interventions       [facility] Name 02/29/24 1128          Motor Skills    Therapeutic Exercise knee;ankle  -SC       Row Name 02/29/24 1128          Knee (Therapeutic Exercise)    Knee (Therapeutic Exercise) strengthening exercise;isometric exercises  -SC     Knee Isometrics (Therapeutic Exercise) left;quad sets;sitting;10 repetitions  -SC     Knee Strengthening (Therapeutic Exercise) left;SLR (straight leg raise);SAQ (short arc quad);heel slides;LAQ (long arc quad);10 repetitions  -[facility] Name 02/29/24 1128          Ankle (Therapeutic Exercise)    Ankle  (Therapeutic Exercise) AROM (active range of motion)  -SC     Ankle AROM (Therapeutic Exercise) bilateral;dorsiflexion;plantarflexion;20 repititions  -SC               User Key  (r) = Recorded By, (t) = Taken By, (c) = Cosigned By      Initials Name Provider Type    Isaias Ayala PT Physical Therapist                   Goals/Plan    No documentation.                  Clinical Impression       Row Name 02/29/24 1128          Pain Scale: FACES Pre/Post-Treatment    Pain: FACES Scale, Pretreatment 4-->hurts little more  -SC     Posttreatment Pain Rating 4-->hurts little more  -SC     Pain Location - Side/Orientation Left  -SC     Pain Location - knee  -[facility] Name 02/29/24 1128          Plan of Care Review    Plan of Care Reviewed With patient  -SC     Progress no change  -SC     Outcome Evaluation Patient's mobility continues to be affected by nausea and vertigo like feeling. She was able to partivipate in all therapeutic exercise  -[facility] Name 02/29/24 1128          Therapy Assessment/Plan (PT)    Rehab Potential (PT) good, to achieve stated therapy goals  -SC     Criteria for Skilled Interventions Met (PT) yes;meets criteria;skilled treatment is necessary  -SC     Therapy Frequency (PT) 2 times/day  -[facility] Name 02/29/24 1128          Positioning and Restraints    Pre-Treatment Position sitting in chair/recliner  -SC     Post Treatment Position chair  -SC     In Chair notified nsg;reclined;encouraged to call for assist;call light within reach;sitting;exit alarm on;with family/caregiver  -SC               User Key  (r) = Recorded By, (t) = Taken By, (c) = Cosigned By      Initials Name Provider Type    SC Isaias Nunez PT Physical Therapist                   Outcome Measures       Row Name 02/29/24 1130          How much help from another person do you currently need...    Turning from your back to your side while in flat bed without using bedrails? 3  -SC     Moving from lying on back to  sitting on the side of a flat bed without bedrails? 3  -SC     Moving to and from a bed to a chair (including a wheelchair)? 3  -SC     Standing up from a chair using your arms (e.g., wheelchair, bedside chair)? 3  -SC     Climbing 3-5 steps with a railing? 3  -SC     To walk in hospital room? 3  -SC     AM-PAC 6 Clicks Score (PT) 18  -SC     Highest Level of Mobility Goal 6 --> Walk 10 steps or more  -SC       Row Name 02/29/24 1130 02/29/24 0914       Functional Assessment    Outcome Measure Options AM-PAC 6 Clicks Basic Mobility (PT)  -SC AM-PAC 6 Clicks Daily Activity (OT)  -              User Key  (r) = Recorded By, (t) = Taken By, (c) = Cosigned By      Initials Name Provider Type    SC Isaias Nunez, PT Physical Therapist    TB Milka Puga, OT Occupational Therapist                                 Physical Therapy Education       Title: PT OT SLP Therapies (In Progress)       Topic: Physical Therapy (Done)       Point: Mobility training (Done)       Learning Progress Summary             Patient Eager, E, VU by SC at 2/29/2024 1130    Comment: reviewed HEp    Acceptance, E,D, VU,DU by  at 2/28/2024 1815                         Point: Home exercise program (Done)       Learning Progress Summary             Patient Eager, E, VU by SC at 2/29/2024 1130    Comment: reviewed HEp    Acceptance, E,D, VU,DU by  at 2/28/2024 1815                         Point: Body mechanics (Done)       Learning Progress Summary             Patient Eager, E, VU by SC at 2/29/2024 1130    Comment: reviewed HEp    Acceptance, E,D, VU,DU by  at 2/28/2024 1815                         Point: Precautions (Done)       Learning Progress Summary             Patient Eager, E, VU by SC at 2/29/2024 1130    Comment: reviewed HEp    Acceptance, E,D, VU,DU by  at 2/28/2024 1815                                         User Key       Initials Effective Dates Name Provider Type Discipline    SC 02/03/23 -  Isaias Nunez, PT  Physical Therapist PT     12/15/23 -  Zenaida Morrow PT Physical Therapist PT                  PT Recommendation and Plan     Plan of Care Reviewed With: patient  Progress: no change  Outcome Evaluation: Patient's mobility continues to be affected by nausea and vertigo like feeling. She was able to partivipate in all therapeutic exercise     Time Calculation:         PT Charges       Row Name 02/29/24 1105             Time Calculation    Start Time 1105  -SC      PT Received On 02/29/24  -SC      PT Goal Re-Cert Due Date 03/09/24  -SC         Timed Charges    52440 - PT Therapeutic Exercise Minutes 16  -SC         Total Minutes    Timed Charges Total Minutes 16  -SC       Total Minutes 16  -SC                User Key  (r) = Recorded By, (t) = Taken By, (c) = Cosigned By      Initials Name Provider Type    SC Isaias Nunez PT Physical Therapist                  Therapy Charges for Today       Code Description Service Date Service Provider Modifiers Qty    09523619491 HC PT THER PROC EA 15 MIN 2/29/2024 Isaias Nunez PT GP 1            PT G-Codes  Outcome Measure Options: AM-PAC 6 Clicks Basic Mobility (PT)  AM-PAC 6 Clicks Score (PT): 18  AM-PAC 6 Clicks Score (OT): 16  PT Discharge Summary  Anticipated Discharge Disposition (PT): home with assist, home with home health    Isaias Nunez PT  2/29/2024

## 2024-02-29 NOTE — PROGRESS NOTES
[facility]    Acute pain service Inpatient Progress Note    Patient Name: Renata Lynn  :  1950  MRN:  [id number]        Acute Pain  Service Inpatient Progress Note:    Analgesia:Good  Pain Score:2/10  LOC: alert and awake  Resp Status: room air  Cardiac: VS stable  Side Effects:None  Catheter Site:clean, dressing intact and dry  Cath type: peripheral nerve cath with ON Q  Volume: 1mL,8ml, 8ml InfuSystem Pump.  Catheter Plan:Catheter to remain Insitu and Continue catheter infusion rate unchanged  Comments:

## 2024-02-29 NOTE — PROGRESS NOTES
"IM progress note      Renata Lynn  [id number]  1950     LOS: 0 days     Attending: Mateo Burris,*    Primary Care Provider: Anuj Sarah DO      Chief Complaint/Reason for visit:  No chief complaint on file.      Subjective    Having some difficulties related to dizziness. Had nausea, and yesterday had emesis.   Wasn't able to get up to ambulate with PT today.  Has history of vertigo episodes , transient episode that can happen years apart.     Objective        Visit Vitals  /56 (BP Location: Left arm, Patient Position: Sitting)   Pulse 76   Temp 98.7 °F (37.1 °C) (Oral)   Resp 18   Ht 167.6 cm (66\")   Wt 93.3 kg (205 lb 11 oz)   SpO2 97%   BMI 33.20 kg/m²     Temp (24hrs), Av.8 °F (36.6 °C), Min:96.6 °F (35.9 °C), Max:98.9 °F (37.2 °C)      Intake/Output:    Intake/Output Summary (Last 24 hours) at 2024 1158  Last data filed at 2024 0754  Gross per 24 hour   Intake 1400 ml   Output 550 ml   Net 850 ml        Physical Therapy:    Physical Exam:     General Appearance:    Alert, cooperative, in no acute distress   Head:    Normocephalic, without obvious abnormality, atraumatic    Lungs:     Normal effort, symmetric chest rise,  clear to      auscultation bilaterally              Heart:    Regular rhythm and normal rate, normal S1 and S2    Abdomen:     Normal bowel sounds, no masses, no organomegaly, soft        non-tender, non-distended, no guarding, no rebound                tenderness   Extremities:   CDI dressing. PNB cath present.  Flexion and dorsiflexion intact bilateral feet.    No clubbing, cyanosis or edema.  No deformities.    Pulses:   Pulses palpable and equal bilaterally   Skin:   No bleeding, bruising or rash          Results Review:     I reviewed the patient's new clinical results.   Results from last 7 days   Lab Units 24  0359   WBC 10*3/mm3 8.14   HEMOGLOBIN g/dL 10.4*   HEMATOCRIT % 32.5*   PLATELETS 10*3/mm3 153     Results from last " days   Lab Units 02/29/24  0359 02/28/24  0859   SODIUM mmol/L 138  --    POTASSIUM mmol/L 4.4 4.2   CHLORIDE mmol/L 104  --    CO2 mmol/L 25.0  --    BUN mg/dL 15  --    CREATININE mg/dL 0.79  --    CALCIUM mg/dL 8.6  --    GLUCOSE mg/dL 143*  --      I reviewed the patient's new imaging including images and reports.    All medications reviewed.   acetaminophen, 1,000 mg, Oral, Q6H  apixaban, 2.5 mg, Oral, Q12H  atorvastatin, 10 mg, Oral, Nightly  carvedilol, 25 mg, Oral, BID  ceFAZolin, 1,000 mg, Intravenous, Q8H  dilTIAZem CD, 120 mg, Oral, Q24H  insulin detemir, 10 Units, Subcutaneous, Nightly  insulin lispro, 2-7 Units, Subcutaneous, 4x Daily AC & at Bedtime  meloxicam, 15 mg, Oral, Daily  pantoprazole, 40 mg, Oral, Q AM  sodium chloride, 3 mL, Intravenous, Q12H  valsartan, 320 mg, Oral, Daily      dextrose, 25 g, Q15 Min PRN  dextrose, 15 g, Q15 Min PRN  docusate sodium, 100 mg, BID PRN  glucagon (human recombinant), 1 mg, Q15 Min PRN  labetalol, 10 mg, Q4H PRN  meclizine, 25 mg, TID PRN  Morphine, 4 mg, Q2H PRN   And  naloxone, 0.4 mg, Q5 Min PRN  nitroglycerin, 0.4 mg, Q5 Min PRN  ondansetron ODT, 4 mg, Q6H PRN   Or  ondansetron, 4 mg, Q6H PRN  oxyCODONE, 5 mg, Q4H PRN  sodium chloride, 500 mL, TID PRN  sodium chloride, 3-10 mL, PRN  sodium chloride, 40 mL, PRN        Assessment & Plan       S/P TKR (total knee replacement), left    Uncontrolled type 2 diabetes mellitus with hyperglycemia    Benign hypertension    Mixed hyperlipidemia    Primary osteoarthritis of left knee    Paroxysmal atrial fibrillation    Chronic anticoagulation    Obesity (BMI 30-39.9)         Plan  Check orthostatics.   Keep on IVF for now.  Watch for improved symptoms of dizziness and nausea with symptomatic treatment.     1. PT/OT,  Weight bearing as tolerated left LE  2. Pain control-prns, ACB cath with ropivacaine infusion.  3. IS-encourage  4. DVT proph- Mechanicals and Eliquis starting at 2.5 mg twice daily on postop day 1 and  after 5 days increase to home dose of 5 mg twice daily  5. Bowel regimen  6. Resume home medications as appropriate  7. Monitor post-op labs  8. DC planning for home     - Hypertension:  Resume home medications as appropriate, formulary substitution when indicated.  Holding parameters.  Prn medications for elevated blood pressure.     -Dyslipidemia:  Resume home regimen statin ( formulary substitution when appropriate).     -DM type 2  Hgb A1C 7.5  Hold OHA as appropriate  FSBG AC/HS, ( q 6 when NPO), along with correction humalog.  Long acting insulin,.     -CLEMENTE:  Continue CPAP.   Monitor O2 sats.     -History of paroxysmal A-fib:  Resume beta-blocker and calcium channel blocker.  Eliquis resumption as noted above.    Mónica Mcclain MD  02/29/24  11:58 EST

## 2024-02-29 NOTE — THERAPY DISCHARGE NOTE
Acute Care - Occupational Therapy Discharge  [facility]    Patient Name: Renata Lynn  : 1950    MRN: [id number]                              Today's Date: 2024       Admit Date: 2024    Visit Dx:     ICD-10-CM ICD-9-CM   1. Primary osteoarthritis of left knee  M17.12 715.16     Patient Active Problem List   Diagnosis    Uncontrolled type 2 diabetes mellitus with hyperglycemia    Benign hypertension    Mixed hyperlipidemia    Primary osteoarthritis of left knee    Paroxysmal atrial fibrillation    S/P TKR (total knee replacement), left    Chronic anticoagulation    Obesity (BMI 30-39.9)     Past Medical History:   Diagnosis Date    Asthma ?      Listed on hospital release form    Atrial fibrillation Oct  27, 2023    Basal cell carcinoma     face    CTS (carpal tunnel syndrome)     GERD (gastroesophageal reflux disease)     Hypertensive disorder     Mixed hyperlipidemia     Obesity     body mass index 30+-obesity    Rotator cuff syndrome 01/15/24    Currently in pt    Sleep apnea  ?    CPAP nightly    Tear of meniscus of knee     Surgery scheduled 24    Type 2 diabetes mellitus, uncontrolled      Past Surgical History:   Procedure Laterality Date    APPENDECTOMY      CARPAL TUNNEL RELEASE Bilateral     CATARACT EXTRACTION, BILATERAL      COLONOSCOPY      FOOT SURGERY Right     hammer toe surgery    HAND SURGERY      SKIN CANCER EXCISION      22 years ago-basal cell carcinoma removed off of face    WISDOM TOOTH EXTRACTION        General Information       Row Name 24 0851          OT Time and Intention    Document Type discharge evaluation/summary;discharge treatment  -TB     Mode of Treatment occupational therapy;individual therapy  -TB       Row Name 24 0851          General Information    Patient Profile Reviewed yes  -TB     Prior Level of Function independent:;all household mobility;transfer;bed mobility;ADL's  Pt reports independent with all self-care  and mobility prior. Increased pain and effort all activities.  -TB     Existing Precautions/Restrictions fall;other (see comments)  s/p L TKA, WBAT, AC nerve catheter  -TB       Row Name 02/29/24 0851          Occupational Profile    Reason for Services/Referral (Occupational Profile) Occupational decline  -TB     Environmental Supports and Barriers (Occupational Profile) Pt lives with her spouse in a single story home with 1 step to enter and 2 locations within home with 1-2 steps. Walk-in shower with seat and grab bars. Commode seat in place. Pt's daughter will be staying to assist pt at d/c.  -TB       Row Name 02/29/24 0851          Living Environment    People in Home spouse  -TB       Row Name 02/29/24 0851          Home Main Entrance    Number of Stairs, Main Entrance one  -TB     Stair Railings, Main Entrance none  -TB       Row Name 02/29/24 0851          Stairs Within Home, Primary    Number of Stairs, Within Home, Primary two  -TB     Stair Railings, Within Home, Primary none  -TB       Row Name 02/29/24 0851          Cognition    Orientation Status (Cognition) oriented x 4  -TB       Row Name 02/29/24 0851          Safety Issues, Functional Mobility    Safety Issues Affecting Function (Mobility) insight into deficits/self-awareness;awareness of need for assistance;safety precaution awareness;safety precautions follow-through/compliance;sequencing abilities;positioning of assistive device  -TB     Impairments Affecting Function (Mobility) balance;endurance/activity tolerance;pain;strength;range of motion (ROM)  -TB     Comment, Safety Issues/Impairments (Mobility) Pt is up in room/bathroom with Min Ax2, progressing to Min Ax1. Limited by acute nausea.  -TB               User Key  (r) = Recorded By, (t) = Taken By, (c) = Cosigned By      Initials Name Provider Type    TB Milka Puga OT Occupational Therapist                   Mobility/ADL's       Row Name 02/29/24 0856          Bed Mobility     Bed Mobility supine-sit;scooting/bridging  -TB     Scooting/Bridging [place] (Bed Mobility) standby assist;verbal cues  -TB     Supine-Sit [place] (Bed Mobility) standby assist;verbal cues  -TB     Bed Mobility, Safety Issues decreased use of legs for bridging/pushing  -TB     Assistive Device (Bed Mobility) leg   -TB     Comment, (Bed Mobility) Leg  issued and teaching completed. Pt demonstrates good understanding for use.  -TB       Row Name 02/29/24 0856          Transfers    Transfers sit-stand transfer;stand-sit transfer;toilet transfer;bed-chair transfer  -TB     Comment, (Transfers) Education and cues for hand placement, sequencing, and safety.  -TB       Row Name 02/29/24 0856          Bed-Chair Transfer    Bed-Chair [place] (Transfers) minimum assist (75% patient effort);1 person assist;verbal cues  -TB     Assistive Device (Bed-Chair Transfers) walker, front-wheeled  -TB       Row Name 02/29/24 0856          Sit-Stand Transfer    Sit-Stand [place] (Transfers) minimum assist (75% patient effort);1 person assist;verbal cues  -TB     Assistive Device (Sit-Stand Transfers) walker, front-wheeled  -TB       Row Name 02/29/24 0856          Stand-Sit Transfer    Stand-Sit [place] (Transfers) minimum assist (75% patient effort);1 person assist;verbal cues  -TB     Assistive Device (Stand-Sit Transfers) walker, front-wheeled  -TB       Row Name 02/29/24 0856          Toilet Transfer    Type (Toilet Transfer) sit-stand;stand-sit  -TB     [place] Level (Toilet Transfer) minimum assist (75% patient effort);2 person assist;verbal cues  -TB     Assistive Device (Toilet Transfer) raised toilet seat;grab bars/safety frame;walker, front-wheeled  -TB       Row Name 02/29/24 0856          Functional Mobility    Functional Mobility- Ind. Level minimum assist (75% patient effort);2 person assist required;verbal cues required  -TB     Functional Mobility- Device walker, front-wheeled   -TB     Functional Mobility-Distance (Feet) 30  -TB     Functional Mobility- Safety Issues step length decreased;sequencing ability decreased;balance decreased during turns  -TB     Functional Mobility- Comment Pt is up in room/bathroom with Min Ax2, progressing to Min Ax1. Limited by acute nausea.  -TB     Patient was able to Ambulate yes  -TB       Row Name 02/29/24 0856          Activities of Daily Living    BADL Assessment/Intervention bathing;upper body dressing;lower body dressing;grooming;feeding;toileting  -TB       Row Name 02/29/24 0856          Mobility    Extremity Weight-bearing Status left lower extremity  -TB     Left Lower Extremity (Weight-bearing Status) weight-bearing as tolerated (WBAT)  -TB       Row Name 02/29/24 0856          Bathing Assessment/Intervention    [place] Level (Bathing) bathing skills  -TB     Comment, (Bathing) Education completed for nerve catheter precautions (no showers until block removed)  -TB       Row Name 02/29/24 0856          Upper Body Dressing Assessment/Training    [place] Level (Upper Body Dressing) don;pajama/robe;set up  -TB     Position (Upper Body Dressing) edge of bed sitting  -TB       Row Name 02/29/24 0856          Lower Body Dressing Assessment/Training    [place] Level (Lower Body Dressing) lower body dressing skills;maximum assist (25% patient effort)  -TB     Position (Lower Body Dressing) edge of bed sitting  -TB     Comment, (Lower Body Dressing) Education/demonstration completed for precautions and ADL retraining to maintain and promote independence with self-care.  -TB       Row Name 02/29/24 0856          Grooming Assessment/Training    [place] Level (Grooming) set up  -TB     Position (Grooming) unsupported sitting  -TB     Comment, (Grooming) to wash hands following toileting  -TB       Row Name 02/29/24 0856          Self-Feeding Assessment/Training    [place] Level (Feeding) independent;liquids to mouth  -TB      Position (Self-Feeding) sitting up in bed  -TB       Row Name 02/29/24 0856          Toileting Assessment/Training    [place] Level (Toileting) maximum assist (25% patient effort);adjust/manage clothing;independent;perform perineal hygiene  -     Position (Toileting) unsupported sitting;supported standing  -TB               User Key  (r) = Recorded By, (t) = Taken By, (c) = Cosigned By      Initials Name Provider Type    TB Milka Pgua OT Occupational Therapist                   Obj/Interventions       Row Name 02/29/24 0902          Sensory Assessment (Somatosensory)    Sensory Assessment (Somatosensory) UE sensation intact  -       Row Name 02/29/24 0902          Range of Motion Comprehensive    General Range of Motion bilateral upper extremity ROM WFL  -TB     Comment, General Range of Motion BUE AROM WFL for self-care  -       Row Name 02/29/24 0902          Strength Comprehensive (MMT)    Comment, General Manual Muscle Testing (MMT) Assessment Generalized weakness/deconditioned. No focal deficits.  -       Row Name 02/29/24 0902          Balance    Balance Assessment sitting dynamic balance;sit to stand dynamic balance;standing dynamic balance  -TB     Dynamic Sitting Balance supervision  -TB     Position, Sitting Balance unsupported;sitting in chair;sitting edge of bed  Commode  -TB     Sit to Stand Dynamic Balance minimal assist;1-person assist;verbal cues  -TB     Dynamic Standing Balance minimal assist;1-person assist;verbal cues  -TB     Position/Device Used, Standing Balance supported;walker, front-wheeled  -TB     Balance Interventions sitting;standing;sit to stand;supported;dynamic;dynamic reaching;occupation based/functional task;UE activity with balance activity  -TB     Comment, Balance No LOB  -TB               User Key  (r) = Recorded By, (t) = Taken By, (c) = Cosigned By      Initials Name Provider Type    Milka Cook OT Occupational Therapist                    Goals/Plan    No documentation.                  Clinical Impression       Row Name 02/29/24 0903          Pain Assessment    Pain Intervention(s) Ambulation/increased activity;Repositioned;Elevated;Cold applied;Other (Comment)  LLE AC nerve catheter  -TB     Additional Documentation Pain Scale: FACES Pre/Post-Treatment (Group)  -TB       Row Name 02/29/24 0903          Pain Scale: FACES Pre/Post-Treatment    Pain: FACES Scale, Pretreatment 4-->hurts little more  -TB     Posttreatment Pain Rating 4-->hurts little more  -TB     Pain Location - Side/Orientation Left  -TB     Pain Location generalized  -TB     Pain Location - knee  -TB     Pre/Posttreatment Pain Comment Pt tolerates EOB/OOB activity  -TB       Row Name 02/29/24 0903          Plan of Care Review    Plan of Care Reviewed With patient  -TB     Progress --  IE  -TB     Outcome Evaluation OT IE completed. Pt is A/Ox4 and participates in therapy with encouragement and time. Presents below her occupational baseline with acute nausea (pre-medicated), pain, and generalized weakness limiting mobility and self-care. Pt is up in room/bathroom and transfering with Min Ax2, progressing to Min Ax1. No LOB. Education/demonstration completed for precautions and ADL retraining to maintain and to promote independence with self-care. OT will d/c at this time. Recommend home with family support. No DME needs at this time.  -TB       Row Name 02/29/24 0903          Therapy Assessment/Plan (OT)    Therapy Frequency (OT) evaluation only  -TB       Row Name 02/29/24 0903          Therapy Plan Review/Discharge Plan (OT)    Anticipated Discharge Disposition (OT) home with assist  -TB       Row Name 02/29/24 0903          Vital Signs    Pre Systolic BP Rehab --  RN cleared OT  -TB     Pre SpO2 (%) 93  -TB     O2 Delivery Pre Treatment nasal cannula  -TB     Intra SpO2 (%) 86  -TB     O2 Delivery Intra Treatment room air  -TB     Post SpO2 (%) 92  -TB     O2 Delivery Post  Treatment nasal cannula  -TB     Pre Patient Position Supine  -TB     Intra Patient Position Standing  -TB     Post Patient Position Sitting  -TB       Row Name 02/29/24 0903          Positioning and Restraints    Pre-Treatment Position in bed  -TB     Post Treatment Position chair  -TB     In Chair notified nsg;reclined;call light within reach;encouraged to call for assist;exit alarm on;waffle cushion;legs elevated  -TB               User Key  (r) = Recorded By, (t) = Taken By, (c) = Cosigned By      Initials Name Provider Type    Milka Cook OT Occupational Therapist                   Outcome Measures       Row Name 02/29/24 0914          How much help from another is currently needed...    Putting on and taking off regular lower body clothing? 2  -TB     Bathing (including washing, rinsing, and drying) 2  -TB     Toileting (which includes using toilet bed pan or urinal) 2  -TB     Putting on and taking off regular upper body clothing 3  -TB     Taking care of personal grooming (such as brushing teeth) 3  -TB     Eating meals 4  -TB     AM-PAC 6 Clicks Score (OT) 16  -TB       Row Name 02/29/24 0914          Functional Assessment    Outcome Measure Options AM-PAC 6 Clicks Daily Activity (OT)  -TB               User Key  (r) = Recorded By, (t) = Taken By, (c) = Cosigned By      Initials Name Provider Type    Milka Cook OT Occupational Therapist                  Occupational Therapy Education       Title: PT OT SLP Therapies (In Progress)       Topic: Occupational Therapy (In Progress)       Point: ADL training (Done)       Description:   Instruct learner(s) on proper safety adaptation and remediation techniques during self care or transfers.   Instruct in proper use of assistive devices.                  Learning Progress Summary             Patient Acceptance, E,D,TB, VU,DU,NR by TB at 2/29/2024 0914                         Point: Home exercise program (Not Started)        Description:   Instruct learner(s) on appropriate technique for monitoring, assisting and/or progressing therapeutic exercises/activities.                  Learner Progress:  Not documented in this visit.              Point: Precautions (Done)       Description:   Instruct learner(s) on prescribed precautions during self-care and functional transfers.                  Learning Progress Summary             Patient Acceptance, E,D,TB, VU,DU,NR by TB at 2/29/2024 0914                         Point: Body mechanics (Not Started)       Description:   Instruct learner(s) on proper positioning and spine alignment during self-care, functional mobility activities and/or exercises.                  Learner Progress:  Not documented in this visit.                              User Key       Initials Effective Dates Name Provider Type Discipline     07/11/23 -  Milka Puga, OT Occupational Therapist OT                  OT Recommendation and Plan  Therapy Frequency (OT): evaluation only  Plan of Care Review  Plan of Care Reviewed With: patient  Progress:  (IE)  Outcome Evaluation: OT IE completed. Pt is A/Ox4 and participates in therapy with encouragement and time. Presents below her occupational baseline with acute nausea (pre-medicated), pain, and generalized weakness limiting mobility and self-care. Pt is up in room/bathroom and transfering with Min Ax2, progressing to Min Ax1. No LOB. Education/demonstration completed for precautions and ADL retraining to maintain and to promote independence with self-care. OT will d/c at this time. Recommend home with family support. No DME needs at this time.  Plan of Care Reviewed With: patient  Outcome Evaluation: OT IE completed. Pt is A/Ox4 and participates in therapy with encouragement and time. Presents below her occupational baseline with acute nausea (pre-medicated), pain, and generalized weakness limiting mobility and self-care. Pt is up in room/bathroom and  transfering with Min Ax2, progressing to Min Ax1. No LOB. Education/demonstration completed for precautions and ADL retraining to maintain and to promote independence with self-care. OT will d/c at this time. Recommend home with family support. No DME needs at this time.     Time Calculation:   Evaluation Complexity (OT)  Review Occupational Profile/Medical/Therapy History Complexity: expanded/moderate complexity  Assessment, Occupational Performance/Identification of Deficit Complexity: 3-5 performance deficits  Clinical Decision Making Complexity (OT): detailed assessment/moderate complexity  Overall Complexity of Evaluation (OT): moderate complexity     Time Calculation- OT       Row Name 02/29/24 0748             Time Calculation- OT    OT Start Time 0748  -TB      OT Received On 02/29/24  -TB         Timed Charges    29127 - OT Self Care/Mgmt Minutes 20  -TB         Untimed Charges    OT Eval/Re-eval Minutes 55  -TB         Total Minutes    Timed Charges Total Minutes 20  -TB      Untimed Charges Total Minutes 55  -TB       Total Minutes 75  -TB                User Key  (r) = Recorded By, (t) = Taken By, (c) = Cosigned By      Initials Name Provider Type    TB Milka Puga OT Occupational Therapist                  Therapy Charges for Today       Code Description Service Date Service Provider Modifiers Qty    91728837574 HC OT SELF CARE/MGMT/TRAIN EA 15 MIN 2/29/2024 Milka Puga OT GO 1    [id number]  OT EVAL MOD COMPLEXITY 4 2/29/2024 Milka Puga OT GO 1               OT Discharge Summary  Anticipated Discharge Disposition (OT): home with assist    Milka Puga OT  2/29/2024

## 2024-02-29 NOTE — CASE MANAGEMENT/SOCIAL WORK
"Continued Stay Note  [facility]     Patient Name: Renata Lynn  MRN: [id number]  Today's Date: 2/29/2024    Admit Date: 2/28/2024    Plan: Home with 's assistance and [facility]   Discharge Plan       Row Name 02/29/24 1638       Plan    Plan Home with 's assistance and [facility]    Patient/Family in Agreement with Plan yes    Plan Comments Met with Ms. Lynn and her , Reed, at the bedside, for discharge planning.  Ms. Lynn lives with Reed in [facility].    She has been evaluated by PT and per notes, \" Patient with improving nausea and vertigo. Demonstrated improving strength and mobility, increasing her distance in the hallway. Anticipated Discharge Disposition (PT): home with assist, home with home health.\"    Ms. Lynn states that she has a rolling walker, bedside commode and a shower seat at home.  She denies any additional DME needs.    The patient was a pre referral from Dr. Cooley's office to [facility].  Confirmed referral with Emili from [facility].  Emili is changing Ms. Lynn's first home appointment to Monday, 3/4/24.    PCP is Anuj Sarah.  Insurance is [employer] with no interruption in coverage.  No ACP documents.    DC plan is to return home with Reed's assistance, as needed.    Reed will be transporting his wife home by personal vehicle.    CM will continue to follow.    Final Discharge Disposition Code 01 - home or self-care                                  Expected Discharge Date and Time       Expected Discharge Date Expected Discharge Time    Mar 1 2024               Mara Gross RN    "

## 2024-02-29 NOTE — PLAN OF CARE
Problem: Adult Inpatient Plan of Care  Goal: Plan of Care Review  Recent Flowsheet Documentation  Taken 2/29/2024 0903 by Milka Puga OT  Progress: (IE) --  Plan of Care Reviewed With: patient  Outcome Evaluation: OT IE completed. Pt is A/Ox4 and participates in therapy with encouragement and time. Presents below her occupational baseline with acute nausea (pre-medicated), pain, and generalized weakness limiting mobility and self-care. Pt is up in room/bathroom and transfering with Min Ax2, progressing to Min Ax1. No LOB. Education/demonstration completed for precautions and ADL retraining to maintain and to promote independence with self-care. OT will d/c at this time. Recommend home with family support. No DME needs at this time.

## 2024-02-29 NOTE — PLAN OF CARE
Problem: Adult Inpatient Plan of Care  Goal: Plan of Care Review  Outcome: Ongoing, Progressing  Flowsheets (Taken 2/29/2024 0450)  Progress: improving  Plan of Care Reviewed With: patient  Goal: Absence of Hospital-Acquired Illness or Injury  Outcome: Ongoing, Progressing  Intervention: Identify and Manage Fall Risk  Recent Flowsheet Documentation  Taken 2/29/2024 0420 by Sandra Gomez RN  Safety Promotion/Fall Prevention:   activity supervised   assistive device/personal items within reach   clutter free environment maintained   elopement precautions   fall prevention program maintained   gait belt   lighting adjusted   mobility aid in reach   muscle strengthening facilitated   nonskid shoes/slippers when out of bed   room organization consistent   safety round/check completed   toileting scheduled  Taken 2/29/2024 0215 by Sandra Gomez RN  Safety Promotion/Fall Prevention:   activity supervised   assistive device/personal items within reach   clutter free environment maintained   elopement precautions   fall prevention program maintained   gait belt   lighting adjusted   mobility aid in reach   muscle strengthening facilitated   nonskid shoes/slippers when out of bed   room organization consistent   safety round/check completed   toileting scheduled  Taken 2/29/2024 0030 by Sandra Gomez RN  Safety Promotion/Fall Prevention:   activity supervised   assistive device/personal items within reach   clutter free environment maintained   elopement precautions   fall prevention program maintained   gait belt   lighting adjusted   mobility aid in reach   muscle strengthening facilitated   nonskid shoes/slippers when out of bed   room organization consistent   safety round/check completed   toileting scheduled  Taken 2/28/2024 2230 by Sandra Gomez RN  Safety Promotion/Fall Prevention:   activity supervised   assistive device/personal items within reach   clutter free environment maintained   elopement  precautions   fall prevention program maintained   gait belt   lighting adjusted   mobility aid in reach   muscle strengthening facilitated   nonskid shoes/slippers when out of bed   room organization consistent   safety round/check completed   toileting scheduled  Taken 2/28/2024 2045 by Sandra Gomez RN  Safety Promotion/Fall Prevention:   activity supervised   assistive device/personal items within reach   clutter free environment maintained   elopement precautions   fall prevention program maintained   gait belt   lighting adjusted   mobility aid in reach   muscle strengthening facilitated   nonskid shoes/slippers when out of bed   room organization consistent   safety round/check completed   toileting scheduled  Intervention: Prevent and Manage VTE (Venous Thromboembolism) Risk  Recent Flowsheet Documentation  Taken 2/29/2024 0420 by Sandra Gomez RN  VTE Prevention/Management:   bilateral   sequential compression devices on  Taken 2/29/2024 0215 by Sandra Gomez RN  VTE Prevention/Management:   bilateral   sequential compression devices on  Taken 2/29/2024 0030 by Sandra Gomez RN  VTE Prevention/Management:   bilateral   sequential compression devices on  Taken 2/28/2024 2230 by Sandra Gomez RN  VTE Prevention/Management:   bilateral   sequential compression devices on  Taken 2/28/2024 2045 by Sandra Gomez RN  VTE Prevention/Management:   sequential compression devices on   bilateral  Goal: Optimal Comfort and Wellbeing  Outcome: Ongoing, Progressing  Intervention: Monitor Pain and Promote Comfort  Recent Flowsheet Documentation  Taken 2/29/2024 0420 by Sandra Gomez RN  Pain Management Interventions: medication offered but refused  Taken 2/28/2024 2045 by Sandra Gomez RN  Pain Management Interventions:   cold applied   medication offered but refused  Intervention: Provide Person-Centered Care  Recent Flowsheet Documentation  Taken 2/28/2024 2045 by Sandra Gomez RN  Trust  Relationship/Rapport:   care explained   choices provided   emotional support provided   empathic listening provided   questions answered   questions encouraged   reassurance provided   thoughts/feelings acknowledged  Goal: Readiness for Transition of Care  Outcome: Ongoing, Progressing   Goal Outcome Evaluation:  Plan of Care Reviewed With: patient        Progress: improving

## 2024-03-01 ENCOUNTER — ANESTHESIA (OUTPATIENT)
Dept: TELEMETRY | Facility: HOSPITAL | Age: 74
End: 2024-03-01
Payer: MEDICARE

## 2024-03-01 ENCOUNTER — ANESTHESIA EVENT (OUTPATIENT)
Dept: TELEMETRY | Facility: HOSPITAL | Age: 74
End: 2024-03-01
Payer: MEDICARE

## 2024-03-01 ENCOUNTER — ANESTHESIA EVENT CONVERTED (OUTPATIENT)
Dept: ANESTHESIOLOGY | Facility: HOSPITAL | Age: 74
End: 2024-03-01
Payer: MEDICARE

## 2024-03-01 VITALS
HEART RATE: 78 BPM | RESPIRATION RATE: 18 BRPM | WEIGHT: 205.69 LBS | SYSTOLIC BLOOD PRESSURE: 144 MMHG | OXYGEN SATURATION: 97 % | BODY MASS INDEX: 33.06 KG/M2 | HEIGHT: 66 IN | TEMPERATURE: 98.5 F | DIASTOLIC BLOOD PRESSURE: 72 MMHG

## 2024-03-01 LAB
GLUCOSE BLDC GLUCOMTR-MCNC: 141 MG/DL (ref 70–130)
GLUCOSE BLDC GLUCOMTR-MCNC: 175 MG/DL (ref 70–130)

## 2024-03-01 PROCEDURE — 63710000001 DILTIAZEM CD 120 MG CAPSULE SUSTAINED-RELEASE 24 HR: Performed by: INTERNAL MEDICINE

## 2024-03-01 PROCEDURE — A9270 NON-COVERED ITEM OR SERVICE: HCPCS | Performed by: ORTHOPAEDIC SURGERY

## 2024-03-01 PROCEDURE — 63710000001 MELOXICAM 15 MG TABLET: Performed by: ORTHOPAEDIC SURGERY

## 2024-03-01 PROCEDURE — 25010000002 CEFAZOLIN PER 500 MG

## 2024-03-01 PROCEDURE — 63710000001 PANTOPRAZOLE 40 MG TABLET DELAYED-RELEASE: Performed by: INTERNAL MEDICINE

## 2024-03-01 PROCEDURE — 97116 GAIT TRAINING THERAPY: CPT

## 2024-03-01 PROCEDURE — A9270 NON-COVERED ITEM OR SERVICE: HCPCS | Performed by: INTERNAL MEDICINE

## 2024-03-01 PROCEDURE — 63710000001 INSULIN LISPRO (HUMAN) PER 5 UNITS: Performed by: INTERNAL MEDICINE

## 2024-03-01 PROCEDURE — 63710000001 ACETAMINOPHEN EXTRA STRENGTH 500 MG TABLET: Performed by: ORTHOPAEDIC SURGERY

## 2024-03-01 PROCEDURE — 99024 POSTOP FOLLOW-UP VISIT: CPT | Performed by: ORTHOPAEDIC SURGERY

## 2024-03-01 PROCEDURE — 25010000002 DEXAMETHASONE SODIUM PHOSPHATE 10 MG/ML SOLUTION: Performed by: NURSE ANESTHETIST, CERTIFIED REGISTERED

## 2024-03-01 PROCEDURE — 97110 THERAPEUTIC EXERCISES: CPT

## 2024-03-01 PROCEDURE — 82948 REAGENT STRIP/BLOOD GLUCOSE: CPT

## 2024-03-01 PROCEDURE — 63710000001 OXYCODONE 5 MG TABLET: Performed by: ORTHOPAEDIC SURGERY

## 2024-03-01 PROCEDURE — 25010000002 SODIUM CHLORIDE 0.9 % WITH KCL 20 MEQ 20-0.9 MEQ/L-% SOLUTION: Performed by: ORTHOPAEDIC SURGERY

## 2024-03-01 PROCEDURE — 63710000001 APIXABAN 2.5 MG TABLET: Performed by: ORTHOPAEDIC SURGERY

## 2024-03-01 PROCEDURE — 25010000002 BUPIVACAINE (PF) 0.25 % SOLUTION: Performed by: NURSE ANESTHETIST, CERTIFIED REGISTERED

## 2024-03-01 PROCEDURE — 97530 THERAPEUTIC ACTIVITIES: CPT

## 2024-03-01 RX ORDER — BUPIVACAINE HYDROCHLORIDE 2.5 MG/ML
INJECTION, SOLUTION EPIDURAL; INFILTRATION; INTRACAUDAL
Status: COMPLETED | OUTPATIENT
Start: 2024-03-01 | End: 2024-03-01

## 2024-03-01 RX ORDER — ROPIVACAINE HYDROCHLORIDE 2 MG/ML
1 INJECTION, SOLUTION EPIDURAL; INFILTRATION; PERINEURAL CONTINUOUS
Start: 2024-03-01

## 2024-03-01 RX ORDER — MECLIZINE HYDROCHLORIDE 25 MG/1
25 TABLET ORAL 3 TIMES DAILY PRN
Qty: 40 TABLET | Refills: 0 | Status: SHIPPED | OUTPATIENT
Start: 2024-03-01

## 2024-03-01 RX ORDER — DEXAMETHASONE SODIUM PHOSPHATE 10 MG/ML
INJECTION, SOLUTION INTRAMUSCULAR; INTRAVENOUS
Status: COMPLETED | OUTPATIENT
Start: 2024-03-01 | End: 2024-03-01

## 2024-03-01 RX ADMIN — POTASSIUM CHLORIDE AND SODIUM CHLORIDE 50 ML/HR: 900; 150 INJECTION, SOLUTION INTRAVENOUS at 13:54

## 2024-03-01 RX ADMIN — OXYCODONE HYDROCHLORIDE 5 MG: 5 TABLET ORAL at 09:56

## 2024-03-01 RX ADMIN — DEXAMETHASONE SODIUM PHOSPHATE 2 MG: 10 INJECTION, SOLUTION INTRAMUSCULAR; INTRAVENOUS at 13:15

## 2024-03-01 RX ADMIN — DILTIAZEM HYDROCHLORIDE 120 MG: 120 CAPSULE, EXTENDED RELEASE ORAL at 08:10

## 2024-03-01 RX ADMIN — PANTOPRAZOLE SODIUM 40 MG: 40 TABLET, DELAYED RELEASE ORAL at 06:22

## 2024-03-01 RX ADMIN — BUPIVACAINE HYDROCHLORIDE 30 ML: 2.5 INJECTION, SOLUTION EPIDURAL; INFILTRATION; INTRACAUDAL; PERINEURAL at 13:15

## 2024-03-01 RX ADMIN — Medication 3 ML: at 08:14

## 2024-03-01 RX ADMIN — INSULIN LISPRO 2 UNITS: 100 INJECTION, SOLUTION INTRAVENOUS; SUBCUTANEOUS at 08:08

## 2024-03-01 RX ADMIN — APIXABAN 2.5 MG: 2.5 TABLET, FILM COATED ORAL at 08:09

## 2024-03-01 RX ADMIN — CEFAZOLIN 1000 MG: 1 INJECTION, POWDER, FOR SOLUTION INTRAMUSCULAR; INTRAVENOUS at 03:14

## 2024-03-01 RX ADMIN — ACETAMINOPHEN 1000 MG: 500 TABLET ORAL at 06:22

## 2024-03-01 RX ADMIN — MELOXICAM 15 MG: 15 TABLET ORAL at 08:11

## 2024-03-01 RX ADMIN — CEFAZOLIN 1000 MG: 1 INJECTION, POWDER, FOR SOLUTION INTRAMUSCULAR; INTRAVENOUS at 09:56

## 2024-03-01 RX ADMIN — ACETAMINOPHEN 1000 MG: 500 TABLET ORAL at 00:17

## 2024-03-01 RX ADMIN — OXYCODONE HYDROCHLORIDE 5 MG: 5 TABLET ORAL at 00:17

## 2024-03-01 NOTE — PLAN OF CARE
Goal Outcome Evaluation:  Plan of Care Reviewed With: patient        Progress: improving  Outcome Evaluation: Pt with good effort and increased ambulation distance to 150' with CGA and RW. Pt also navigated 2 steps with CGA and RW. No overt LOB or knee buckling. HEP completed. Pt with c/o mild dizziness at end of session, /50. Pt is ready for d/c home with assist and HHPT from functional mobility standpoint if deemed medically appropriate.      Anticipated Discharge Disposition (PT): home with assist, home with home health

## 2024-03-01 NOTE — DISCHARGE SUMMARY
Patient Name: Renata Lynn  MRN: [id number]  : 1950  DOS: 3/1/2024    Attending: Mateo Burris,*    Primary Care Provider: Anuj Sarah DO    Date of Admission:.2024  7:43 AM    Date of Discharge:  3/1/2024    Discharge Diagnosis:   S/P TKR (total knee replacement), left    Uncontrolled type 2 diabetes mellitus with hyperglycemia    Benign hypertension    Mixed hyperlipidemia    Primary osteoarthritis of left knee    Paroxysmal atrial fibrillation    Chronic anticoagulation    Obesity (BMI 30-39.9)      Hospital Course    At admit:  ***     After admit:    Patient was provided pain medications as needed for pain control, along with adductor canal nerve block infusion of Ropivacaine.    Adjustments were made to pain medications to optimize postop pain management. Risks and benefits of opiate medications discussed with patient. CLEMENTE report was reviewed.    *** was seen by PT and OT and has progressed well over *** stay.    *** used an IS for atelectasis prophylaxis and *** along with mechanicals for DVT prophylaxis.    Home medications were resumed as appropriate, and labs were monitored and remained fairly stable.     With the progress *** has made, *** is ready for DC *** today.      *** will have an Infupump ( instructed on it during this admit).    Discussed with patient regarding plan and *** shows understanding and agreement.    Patient will have PT following discharge.        Procedures Performed  Procedure(s):  TOTAL KNEE ARTHROPLASTY WITH MAURILIO ROBOT - LEFT       Pertinent Test Results:    I reviewed the patient's new clinical results.   Results from last 7 days   Lab Units 24  0359   WBC 10*3/mm3 8.14   HEMOGLOBIN g/dL 10.4*   HEMATOCRIT % 32.5*   PLATELETS 10*3/mm3 153     Results from last 7 days   Lab Units 24  0359 24  0859   SODIUM mmol/L 138  --    POTASSIUM mmol/L 4.4 4.2   CHLORIDE mmol/L 104  --    CO2 mmol/L 25.0  --    BUN mg/dL 15  --   "  CREATININE mg/dL 0.79  --    CALCIUM mg/dL 8.6  --    GLUCOSE mg/dL 143*  --      I reviewed the patient's new imaging including images and reports.      Physical therapy  ***  Discharge Assessment:       Visit Vitals  /56   Pulse 88   Temp 99.2 °F (37.3 °C) (Oral)   Resp 18   Ht 167.6 cm (66\")   Wt 93.3 kg (205 lb 11 oz)   SpO2 94%   BMI 33.20 kg/m²     Temp (24hrs), Av.8 °F (37.1 °C), Min:98.5 °F (36.9 °C), Max:99.2 °F (37.3 °C)      General Appearance:    Alert, cooperative, in no acute distress   Lungs:     Clear to auscultation,respirations regular, even and                   unlabored    Heart:    Regular rhythm and normal rate, normal S1 and S2   Abdomen:     Normal bowel sounds, no masses, no organomegaly, soft        non-tender, non-distended, no guarding, no rebound                 tenderness   Extremities:   CDI dressing surgical knee, *** . ACB cath present, infupump.   Pulses:   Pulses palpable and equal bilaterally   Skin:   No bleeding, bruising or rash   Neurologic:   Cranial nerves 2 - 12 grossly intact, sensation intact, Flexion and dorsiflexion intact bilateral feet.         Discharge Disposition: ***    Discharge Medications     Discharge Medications        New Medications        Instructions Start Date   acetaminophen 650 MG 8 hr tablet  Commonly known as: TYLENOL   650 mg, Oral, Every 8 Hours      docusate sodium 100 MG capsule  Commonly known as: Colace   100 mg, Oral, 2 Times Daily PRN      doxycycline 100 MG tablet  Commonly known as: ADOXA   100 mg, Oral, 2 Times Daily      meclizine 25 MG tablet  Commonly known as: ANTIVERT   25 mg, Oral, 3 Times Daily PRN      ondansetron 4 MG tablet  Commonly known as: Zofran   4 mg, Oral, Every 8 Hours PRN      oxyCODONE 5 MG immediate release tablet  Commonly known as: ROXICODONE   5 mg, Oral, Every 6 Hours PRN      ropivacaine 0.2 % infusion (INFUSYSTEM)  Commonly known as: NAROPIN   1 mL/hr (2 mg/hr), Peripheral Nerve, Continuous         "     Changes to Medications        Instructions Start Date   apixaban 2.5 MG tablet tablet  Commonly known as: MOHINI  What changed: You were already taking a medication with the same name, and this prescription was added. Make sure you understand how and when to take each.   2.5 mg, Oral, 2 Times Daily      apixaban 5 MG tablet tablet  Commonly known as: Eliquis  What changed: These instructions start on March 6, 2024. If you are unsure what to do until then, ask your doctor or other care provider.   5 mg, Oral, Every 12 Hours Scheduled   Start Date: March 6, 2024            Continue These Medications        Instructions Start Date   Accu-Chek Guide test strip  Generic drug: glucose blood   Test blood glucose daily and as needed.      Accu-Chek Softclix Lancets lancets   Test blood glucose daily and as needed.      albuterol sulfate  (90 Base) MCG/ACT inhaler  Commonly known as: PROVENTIL HFA;VENTOLIN HFA;PROAIR HFA   2 puffs, Inhalation, Every 4 Hours PRN      atorvastatin 10 MG tablet  Commonly known as: LIPITOR   10 mg, Oral, Every Night at Bedtime      Betasept Surgical Scrub 4 % external liquid  Generic drug: chlorhexidine   Shower with solution for 5 days before surgery.      calcium carbonate 600 MG tablet  Commonly known as: OS-SHANICE   1,200 mg, Oral, Daily      carvedilol 25 MG tablet  Commonly known as: COREG   25 mg, Oral, 2 Times Daily      Dilt- MG 24 hr capsule  Generic drug: dilTIAZem XR   120 mg, Oral, Daily, Needs lab work for further refills.      esomeprazole 20 MG capsule  Commonly known as: nexIUM   20 mg, Oral, Every Morning Before Breakfast      flecainide 50 MG tablet  Commonly known as: TAMBOCOR   50 mg, Oral, 2 Times Daily      FreeStyle Terry 3 Sensor misc   Change Every 14 (Fourteen) Days.      glucosamine-chondroitin 500-400 MG capsule capsule   Oral, 2 Times Daily      hydroCHLOROthiazide 25 MG tablet   25 mg, Oral, Daily      metFORMIN 1000 MG tablet  Commonly known as:  GLUCOPHAGE   1,000 mg, Oral, 2 Times Daily With Meals      montelukast 10 MG tablet  Commonly known as: SINGULAIR   10 mg, Oral, Daily      multivitamin with minerals tablet tablet   1 tablet, Oral, Daily      Ozempic (2 MG/DOSE) 8 MG/3ML solution pen-injector  Generic drug: Semaglutide (2 MG/DOSE)   2 mg, Subcutaneous, Weekly      True Metrix Meter w/Device kit   Use to check blood sugar daily as directed by provider      Izabelaya 0.03 MG/ACT solution  Generic drug: Varenicline Tartrate   INSTILL 1 SPRAY IN EACH NOSTRIL TWICE DAILY APPROXIMATELY 12 HOURS APART      valACYclovir 1000 MG tablet  Commonly known as: VALTREX   Take 1 tablet by mouth Daily for 3 days as Needed.      valsartan 320 MG tablet  Commonly known as: DIOVAN   320 mg, Oral, Daily      Vascepa 1 g capsule capsule  Generic drug: icosapent ethyl   Take 2 capsules (2 grams) by mouth 2 (Two) Times a Day.      VITAMIN B COMPLEX PO   1 tablet, Oral, Daily      vitamin D3 125 MCG (5000 UT) capsule capsule   5,000 Units, Oral, Daily      vitamin E 100 UNIT capsule   200 Units, Oral, Daily      ZINC-220 PO   1 tablet, Oral, Daily               Discharge Diet:     Activity at Discharge:     Follow-up Appointments:  Future Appointments   Date Time Provider Department Center   3/21/2024 10:30 AM Mateo Burris MD [facility]   10/2/2024  3:15 PM Babak Jacobson III, MD [facility]         Dragon disclaimer:  Part of this encounter note is an electronic transcription/translation of spoken language to printed text. The electronic translation of spoken language may permit erroneous, or at times, nonsensical words or phrases to be inadvertently transcribed; Although I have reviewed the note for such errors, some may still exist.       Mónica Mcclain MD  03/01/24  10:27 EST             INSTILL 1 SPRAY IN EACH NOSTRIL TWICE DAILY APPROXIMATELY 12 HOURS APART      valACYclovir 1000 MG tablet  Commonly known as: VALTREX   Take 1 tablet by mouth Daily for 3 days as Needed.      valsartan 320 MG tablet  Commonly known as: DIOVAN   320 mg, Oral, Daily      Vascepa 1 g capsule capsule  Generic drug: icosapent ethyl   Take 2 capsules (2 grams) by mouth 2 (Two) Times a Day.      VITAMIN B COMPLEX PO   1 tablet, Oral, Daily      vitamin D3 125 MCG (5000 UT) capsule capsule   5,000 Units, Oral, Daily      vitamin E 100 UNIT capsule   200 Units, Oral, Daily      ZINC-220 PO   1 tablet, Oral, Daily               Discharge Diet: resume prior.     Activity at Discharge:   Weight bearing as tolerated    Future Appointments   Date Time Provider Department Center   3/21/2024 10:30 AM Mateo Burris MD [facility]   10/2/2024  3:15 PM Babak Jacobson III, MD [facility]         Dragon disclaimer:  Part of this encounter note is an electronic transcription/translation of spoken language to printed text. The electronic translation of spoken language may permit erroneous, or at times, nonsensical words or phrases to be inadvertently transcribed; Although I have reviewed the note for such errors, some may still exist.       Mónica Mcclain MD  03/01/24  10:27 EST

## 2024-03-01 NOTE — PROGRESS NOTES
"          Orthopaedic Surgery Progress Note      LOS: 0 days   Patient Care Team:  Anuj Sarah DO as PCP - General (Internal Medicine)  Leroy Whipple MD as Consulting Physician (Endocrinology)  Babak Jacobson III, MD as Cardiologist (Cardiology)  Ishmael Roberto APRN as Nurse Practitioner (Cardiology)    POD 2    Subjective     Interval History:   Patient doing better this morning.  Says she is doing well as long as she has the meclizine tablets.  She is doing well this morning.  No significant pain.  Was able to ambulate yesterday.  She is very happy she stayed.    Objective     Vital Signs:  Temp (24hrs), Av.8 °F (37.1 °C), Min:98.5 °F (36.9 °C), Max:99.2 °F (37.3 °C)    /69 (BP Location: Left arm, Patient Position: Lying)   Pulse 85   Temp 99.2 °F (37.3 °C) (Oral)   Resp 18   Ht 167.6 cm (66\")   Wt 93.3 kg (205 lb 11 oz)   SpO2 94%   BMI 33.20 kg/m²     Labs:  Lab Results (last 24 hours)       Procedure Component Value Units Date/Time    POC Glucose Once [[id number]]  (Abnormal) Collected: 24 0700    Specimen: Blood Updated: 24 0701     Glucose 175 mg/dL     POC Glucose Once [[id number]]  (Abnormal) Collected: 24 1940    Specimen: Blood Updated: 24 1943     Glucose 269 mg/dL     POC Glucose Once [[id number]]  (Abnormal) Collected: 24 1608    Specimen: Blood Updated: 24 1610     Glucose 311 mg/dL     POC Glucose Once [[id number]]  (Abnormal) Collected: 24 1115    Specimen: Blood Updated: 24 1117     Glucose 205 mg/dL             Physical Exam:  EHL, FHL, gastroc soleus, and tibialis anterior are intact  Toes are pink and warm  Surgical dressing is in place  Palpable dorsalis pedis pulse  Calf is soft and nontender      Assessment & Plan     Postoperative day number 2 status post left total knee arthroplasty.    Pain Control: Good overall    PT and OT     DVT prophylaxis: Eliquis 2.5 twice daily for 5 days then resume 5 mg twice " daily dosing    Discharge planning: Anticipate discharge home today.  Patient will need prescription for meclizine in addition to her other medications that have already been prescribed.  This has been called into the [facility] retail pharmacy this morning.    Upon discharge, patient will need follow-up with me in 3 weeks' time.  They will need [facility] re[facility] at home program for physical therapy.  Standard Exofin Fusion dressing care instructions.  Do not remove.  DME per case management.    Admission Status:  I believe this patient meets INPATIENT status due to the need for care which can only be reasonably provided in a hospital setting such as aggressive/expedited ancillary services and/or consultation services, the necessity for IV medications, close physician monitoring and/or the possible need for procedures.  In such, I feel patient’s risk for adverse outcomes and need for care warrant INPATIENT evaluation and predict the patient’s care encounter to likely last beyond 2 midnights.        Mateo Burris MD  03/01/24  07:14 EST

## 2024-03-01 NOTE — PLAN OF CARE
Problem: Adult Inpatient Plan of Care  Goal: Plan of Care Review  Outcome: Ongoing, Progressing  Goal: Patient-Specific Goal (Individualized)  Outcome: Ongoing, Progressing  Goal: Absence of Hospital-Acquired Illness or Injury  Outcome: Ongoing, Progressing  Goal: Optimal Comfort and Wellbeing  Outcome: Ongoing, Progressing  Goal: Readiness for Transition of Care  Outcome: Ongoing, Progressing     Problem: Diabetes Comorbidity  Goal: Blood Glucose Level Within Targeted Range  Outcome: Ongoing, Progressing     Problem: Obstructive Sleep Apnea Risk or Actual Comorbidity Management  Goal: Unobstructed Breathing During Sleep  Outcome: Ongoing, Progressing     Problem: Adjustment to Surgery (Knee Arthroplasty)  Goal: Optimal Coping  Outcome: Ongoing, Progressing     Problem: Bleeding (Knee Arthroplasty)  Goal: Absence of Bleeding  Outcome: Ongoing, Progressing     Problem: Bowel Motility Impaired (Knee Arthroplasty)  Goal: Effective Bowel Elimination  Outcome: Ongoing, Progressing     Problem: Fluid and Electrolyte Imbalance (Knee Arthroplasty)  Goal: Fluid and Electrolyte Balance  Outcome: Ongoing, Progressing     Problem: Functional Ability Impaired (Knee Arthroplasty)  Goal: Optimal Functional Ability  Outcome: Ongoing, Progressing     Problem: Infection (Knee Arthroplasty)  Goal: Absence of Infection Signs and Symptoms  Outcome: Ongoing, Progressing     Problem: Neurovascular Compromise (Knee Arthroplasty)  Goal: Intact Neurovascular Status  Outcome: Ongoing, Progressing     Problem: Ongoing Anesthesia Effects (Knee Arthroplasty)  Goal: Anesthesia/Sedation Recovery  Outcome: Ongoing, Progressing     Problem: Pain (Knee Arthroplasty)  Goal: Acceptable Pain Control  Outcome: Ongoing, Progressing     Problem: Postoperative Nausea and Vomiting (Knee Arthroplasty)  Goal: Nausea and Vomiting Relief  Outcome: Ongoing, Progressing     Problem: Postoperative Urinary Retention (Knee Arthroplasty)  Goal: Effective Urinary  Elimination  Outcome: Ongoing, Progressing     Problem: Respiratory Compromise (Knee Arthroplasty)  Goal: Effective Oxygenation and Ventilation  Outcome: Ongoing, Progressing     Problem: Fall Injury Risk  Goal: Absence of Fall and Fall-Related Injury  Outcome: Ongoing, Progressing   Goal Outcome Evaluation:

## 2024-03-01 NOTE — THERAPY TREATMENT NOTE
Patient Name: Renata Lynn  : 1950    MRN: [id number]                              Today's Date: 3/1/2024       Admit Date: 2024    Visit Dx:     ICD-10-CM ICD-9-CM   1. Primary osteoarthritis of left knee  M17.12 715.16     Patient Active Problem List   Diagnosis    Uncontrolled type 2 diabetes mellitus with hyperglycemia    Benign hypertension    Mixed hyperlipidemia    Primary osteoarthritis of left knee    Paroxysmal atrial fibrillation    S/P TKR (total knee replacement), left    Chronic anticoagulation    Obesity (BMI 30-39.9)     Past Medical History:   Diagnosis Date    Asthma ?      Listed on hospital release form    Atrial fibrillation Oct  27, 2023    Basal cell carcinoma     face    CTS (carpal tunnel syndrome)     GERD (gastroesophageal reflux disease)     Hypertensive disorder     Mixed hyperlipidemia     Obesity     body mass index 30+-obesity    Rotator cuff syndrome 01/15/24    Currently in pt    Sleep apnea  ?    CPAP nightly    Tear of meniscus of knee     Surgery scheduled 24    Type 2 diabetes mellitus, uncontrolled      Past Surgical History:   Procedure Laterality Date    APPENDECTOMY      CARPAL TUNNEL RELEASE Bilateral     CATARACT EXTRACTION, BILATERAL      COLONOSCOPY      FOOT SURGERY Right     hammer toe surgery    HAND SURGERY      SKIN CANCER EXCISION      22 years ago-basal cell carcinoma removed off of face    TOTAL KNEE ARTHROPLASTY Left 2024    Procedure: TOTAL KNEE ARTHROPLASTY WITH MAURILIO ROBOT - LEFT;  Surgeon: Mateo Burris MD;  Location: [facility];  Service: Robotics - Ortho;  Laterality: Left;    WISDOM TOOTH EXTRACTION        General Information       Row Name 24 0955          Physical Therapy Time and Intention    Document Type therapy note (daily note)  -AB     Mode of Treatment physical therapy  -AB       Row Name 24 0955          General Information    Patient Profile Reviewed yes  -AB     Existing  Precautions/Restrictions fall;other (see comments)  adductor nerve cath  -AB     Barriers to Rehab none identified  -AB       Row Name 03/01/24 0955          Cognition    Orientation Status (Cognition) oriented x 4  -AB       Row Name 03/01/24 0955          Safety Issues, Functional Mobility    Safety Issues Affecting Function (Mobility) awareness of need for assistance;insight into deficits/self-awareness;safety precaution awareness;safety precautions follow-through/compliance;sequencing abilities  -AB     Impairments Affecting Function (Mobility) balance;endurance/activity tolerance;pain;strength;range of motion (ROM)  -AB               User Key  (r) = Recorded By, (t) = Taken By, (c) = Cosigned By      Initials Name Provider Type    AB Mayi Osman PT Physical Therapist                   Mobility       Row Name 03/01/24 0956          Bed Mobility    Comment, (Bed Mobility) received [facility]  -AB       Row Name 03/01/24 0956          Transfers    Comment, (Transfers) Cues for hand placement and sequencing. Mild dizziness at end of session.  -AB       Row Name 03/01/24 0956          Sit-Stand Transfer    Sit-Stand [place] (Transfers) contact guard;1 person assist;verbal cues  -AB     Assistive Device (Sit-Stand Transfers) walker, front-wheeled  -AB     Comment, (Sit-Stand Transfer) Cues to advance LLE prior to stand for comfort. STS from multiple surface heights.  -AB       Row Name 03/01/24 0956          Gait/Stairs (Locomotion)    [place] Level (Gait) 1 person assist;contact guard;verbal cues  -AB     Assistive Device (Gait) walker, front-wheeled  -AB     Distance in Feet (Gait) 150  -AB     Deviations/Abnormal Patterns (Gait) left sided deviations;stride length decreased;weight shifting decreased;antalgic;magy decreased  -AB     Bilateral Gait Deviations forward flexed posture  -AB     Left Sided Gait Deviations decreased knee extension;weight shift ability decreased  -AB     [place] Level  (Stairs) contact guard;1 person assist;verbal cues  -AB     Assistive Device (Stairs) walker, front-wheeled  -AB     Number of Steps (Stairs) 2  -AB     Ascending Technique (Stairs) step-to-step  -AB     Descending Technique (Stairs) step-to-step  -AB     Comment, (Gait/Stairs) Pt ambulated with step through gait pattern and slowed magy. Cues for improved weight acceptance onto LLE, upright posture, and walker positioning. No overt LOB or knee buckling. Pt also navigated 2 steps with CGA and RW. Further activity limited by fatigue. Mild dizziness at end of session /50.  -AB       Row Name 03/01/24 0956          Mobility    Extremity Weight-bearing Status left lower extremity  -AB     Left Lower Extremity (Weight-bearing Status) weight-bearing as tolerated (WBAT)  -AB               User Key  (r) = Recorded By, (t) = Taken By, (c) = Cosigned By      Initials Name Provider Type    AB Mayi Osman, PT Physical Therapist                   Obj/Interventions       Row Name 03/01/24 1000          Range of Motion Comprehensive    Comment, General Range of Motion L knee ROM 8-75  -AB       Row Name 03/01/24 1000          Motor Skills    Therapeutic Exercise knee;ankle  -AB       Row Name 03/01/24 1000          Knee (Therapeutic Exercise)    Knee Isometrics (Therapeutic Exercise) left;quad sets;10 repetitions  -AB     Knee Strengthening (Therapeutic Exercise) left;SLR (straight leg raise);SAQ (short arc quad);LAQ (long arc quad);heel slides;10 repetitions  -AB       Row Name 03/01/24 1000          Ankle (Therapeutic Exercise)    Ankle (Therapeutic Exercise) AROM (active range of motion)  -AB     Ankle AROM (Therapeutic Exercise) bilateral;dorsiflexion;plantarflexion;10 repetitions  -AB       Row Name 03/01/24 1000          Balance    Balance Assessment sitting static balance;sitting dynamic balance;standing static balance;standing dynamic balance  -AB     Static Sitting Balance standby assist  -AB     Dynamic  Sitting Balance standby assist  -AB     Position, Sitting Balance unsupported;sitting in chair  -AB     Static Standing Balance contact guard  -AB     Dynamic Standing Balance contact guard;1-person assist;verbal cues  -AB     Position/Device Used, Standing Balance supported;walker, front-wheeled  -AB     Balance Interventions sitting;standing;sit to stand;supported;static;dynamic;occupation based/functional task  -AB     Comment, Balance No overt LOB or knee buckling.  -AB               User Key  (r) = Recorded By, (t) = Taken By, (c) = Cosigned By      Initials Name Provider Type    AB Mayi Osman, PT Physical Therapist                   Goals/Plan    No documentation.                  Clinical Impression       Row Name 03/01/24 1001          Pain    Pretreatment Pain Rating 4/10  -AB     Posttreatment Pain Rating 5/10  -AB     Pain Location - Side/Orientation Left  -AB     Pain Location generalized  -AB     Pain Location - knee  -AB     Pre/Posttreatment Pain Comment tolerated.  -AB     Pain Intervention(s) Ambulation/increased activity;Elevated;Repositioned;Cold applied  -AB       Row Name 03/01/24 1001          Plan of Care Review    Plan of Care Reviewed With patient  -AB     Progress improving  -AB     Outcome Evaluation Pt with good effort and increased ambulation distance to 150' with CGA and RW. Pt also navigated 2 steps with CGA and RW. No overt LOB or knee buckling. HEP completed. Pt with c/o mild dizziness at end of session, /50. Pt is ready for d/c home with assist and HHPT from functional mobility standpoint if deemed medically appropriate.  -AB       Row Name 03/01/24 1001          Vital Signs    Pre Systolic BP Rehab 145  -AB     Pre Treatment Diastolic BP 56  -AB     Post Systolic BP Rehab 142  -AB     Post Treatment Diastolic BP 50  -AB     Pretreatment Heart Rate (beats/min) 90  -AB     Posttreatment Heart Rate (beats/min) 92  -AB     Pre SpO2 (%) 91  -AB     O2 Delivery Pre Treatment  room air  -AB     O2 Delivery Intra Treatment room air  -AB     Post SpO2 (%) 94  -AB     O2 Delivery Post Treatment room air  -AB     Pre Patient Position Sitting  -AB     Intra Patient Position Standing  -AB     Post Patient Position Sitting  -AB       Row Name 03/01/24 1001          Positioning and Restraints    Pre-Treatment Position sitting in chair/recliner  -AB     Post Treatment Position chair  -AB     In Chair notified nsg;reclined;sitting;call light within reach;encouraged to call for assist;exit alarm on;legs elevated;waffle cushion;compression device  -AB               User Key  (r) = Recorded By, (t) = Taken By, (c) = Cosigned By      Initials Name Provider Type    Mayi Skelton PT Physical Therapist                   Outcome Measures       Row Name 03/01/24 1006          How much help from another person do you currently need...    Turning from your back to your side while in flat bed without using bedrails? 4  -AB     Moving from lying on back to sitting on the side of a flat bed without bedrails? 3  -AB     Moving to and from a bed to a chair (including a wheelchair)? 3  -AB     Standing up from a chair using your arms (e.g., wheelchair, bedside chair)? 3  -AB     Climbing 3-5 steps with a railing? 3  -AB     To walk in hospital room? 3  -AB     AM-PAC 6 Clicks Score (PT) 19  -AB     Highest Level of Mobility Goal 6 --> Walk 10 steps or more  -AB       Row Name 03/01/24 1006          Functional Assessment    Outcome Measure Options AM-PAC 6 Clicks Basic Mobility (PT)  -AB               User Key  (r) = Recorded By, (t) = Taken By, (c) = Cosigned By      Initials Name Provider Type    Mayi Skelton PT Physical Therapist                                 Physical Therapy Education       Title: PT OT SLP Therapies (In Progress)       Topic: Physical Therapy (Done)       Point: Mobility training (Done)       Learning Progress Summary             Patient Acceptance, E,D, VU,NR by AB at 3/1/2024  1006    Eager, E, VU by SC at 2/29/2024 1553    Comment: reviewed HEP    Eager, E, VU by SC at 2/29/2024 1130    Comment: reviewed HEp    Acceptance, E,D, VU,DU by  at 2/28/2024 1815                         Point: Home exercise program (Done)       Learning Progress Summary             Patient Acceptance, E,D, VU,NR by AB at 3/1/2024 1006    Eager, E, VU by SC at 2/29/2024 1553    Comment: reviewed HEP    Eager, E, VU by SC at 2/29/2024 1130    Comment: reviewed HEp    Acceptance, E,D, VU,DU by  at 2/28/2024 1815                         Point: Body mechanics (Done)       Learning Progress Summary             Patient Acceptance, E,D, VU,NR by AB at 3/1/2024 1006    Eager, E, VU by SC at 2/29/2024 1553    Comment: reviewed HEP    Eager, E, VU by SC at 2/29/2024 1130    Comment: reviewed HEp    Acceptance, E,D, VU,DU by  at 2/28/2024 1815                         Point: Precautions (Done)       Learning Progress Summary             Patient Acceptance, E,D, VU,NR by AB at 3/1/2024 1006    Eager, E, VU by SC at 2/29/2024 1553    Comment: reviewed HEP    Eager, E, VU by SC at 2/29/2024 1130    Comment: reviewed HEp    Acceptance, E,D, VU,DU by  at 2/28/2024 1815                                         User Key       Initials Effective Dates Name Provider Type Discipline    SC 02/03/23 -  Isaias Nunez, PT Physical Therapist PT     12/15/23 -  Zenaida Morrow, PT Physical Therapist PT    AB 09/22/22 -  Mayi Osman, PT Physical Therapist PT                  PT Recommendation and Plan     Plan of Care Reviewed With: patient  Progress: improving  Outcome Evaluation: Pt with good effort and increased ambulation distance to 150' with CGA and RW. Pt also navigated 2 steps with CGA and RW. No overt LOB or knee buckling. HEP completed. Pt with c/o mild dizziness at end of session, /50. Pt is ready for d/c home with assist and HHPT from functional mobility standpoint if deemed medically appropriate.     Time  Calculation:         PT Charges       Row Name 03/01/24 1007             Time Calculation    Start Time 0802  -AB      PT Received On 03/01/24  -AB         Timed Charges    55690 - PT Therapeutic Exercise Minutes 15  -AB      17958 - Gait Training Minutes  15  -AB      74477 - PT Therapeutic Activity Minutes 9  -AB         Total Minutes    Timed Charges Total Minutes 39  -AB       Total Minutes 39  -AB                User Key  (r) = Recorded By, (t) = Taken By, (c) = Cosigned By      Initials Name Provider Type    AB Mayi Osman, PT Physical Therapist                  Therapy Charges for Today       Code Description Service Date Service Provider Modifiers Qty    50449931154 HC PT THER PROC EA 15 MIN 3/1/2024 Mayi Osman, PT GP 1    00432186204 HC GAIT TRAINING EA 15 MIN 3/1/2024 Mayi Osman, PT GP 1    18265846705 HC PT THERAPEUTIC ACT EA 15 MIN 3/1/2024 Mayi Osman, PT GP 1            PT G-Codes  Outcome Measure Options: AM-PAC 6 Clicks Basic Mobility (PT)  AM-PAC 6 Clicks Score (PT): 19  AM-PAC 6 Clicks Score (OT): 16  PT Discharge Summary  Anticipated Discharge Disposition (PT): home with assist, home with home health    Mayi Osman PT  3/1/2024

## 2024-03-01 NOTE — ANESTHESIA PROCEDURE NOTES
Peripheral Block      Patient reassessed immediately prior to procedure    Reason for block: at surgeon's request and post-op pain management  Performed by  CRNA/CAA: Kevin Ruggiero, CRNA  Assisted by: Mayi Peterson RN  Preanesthetic Checklist  Completed: patient identified, IV checked, site marked, risks and benefits discussed, surgical consent, monitors and equipment checked, pre-op evaluation and timeout performed  Prep:  Pt Position: supine  Sterile barriers:mask, cap, washed/disinfected hands and gloves  Prep: ChloraPrep  Patient monitoring: blood pressure monitoring, continuous pulse oximetry and EKG  Procedure    Guidance:ultrasound guided    ULTRASOUND INTERPRETATION.  Using ultrasound guidance a 20 G gauge needle was placed in close proximity to the nerve, at which point, under ultrasound guidance anesthetic was injected in the area of the nerve and spread of the anesthesia was seen on ultrasound in close proximity thereto.  There were no abnormalities seen on ultrasound; a digital image was taken; and the patient tolerated the procedure with no complications. Images:still images obtained, printed/placed on chart    Laterality:left  Anesthesia block type: Popliteal Plexus.  Injection Technique:single-shot  Needle Type:echogenic and short-bevel  Needle Gauge:20 G  Resistance on Injection: none    Medications Used: dexamethasone sodium phosphate injection - Injection   2 mg - 3/1/2024 1:15:00 PM  bupivacaine PF (MARCAINE) 0.25 % injection - Injection   30 mL - 3/1/2024 1:15:00 PM      Post Assessment  Injection Assessment: negative aspiration for heme, no paresthesia on injection and incremental injection  Patient Tolerance:comfortable throughout block  Complications:no  Additional Notes  A high-frequency linear transducer, with sterile cover, was placed on the anterior mid-thigh (between the anterior superior iliac spine and patella). The transducer was then moved medially to identify the Sartorius muscle  "(Jessica), Vastus Medialis muscle (VMM), Superficial Femoral Artery (SFA) and Vein. The transducer was then moved caudad to position where the SFA is distal and posterior to the Jessica (Adductor Hiatus). The insertion site was prepped and draped in sterile fashion. Skin and cutaneous tissue was infiltrated with 2-5 ml of 1% Lidocaine. Using ultrasound-guidance, a 20-gauge B-Lam 4\" Ultraplex 360 non-stimulating echogenic needle was advanced in plane from lateral to medial. Preservative-free normal saline was utilized for hydro-dissection of tissue, and to confirm needle placement at the 12 o'clock position to the artery. Local anesthetic in incremental 3-5 ml injections. Aspiration every 5 ml to prevent intravascular injection. Injection was completed with negative aspiration of blood and negative intravascular injection. Injection pressures were normal with minimal resistance.             "

## 2024-03-04 ENCOUNTER — TELEPHONE (OUTPATIENT)
Dept: ORTHOPEDIC SURGERY | Facility: CLINIC | Age: 74
End: 2024-03-04
Payer: MEDICARE

## 2024-03-04 DIAGNOSIS — Z96.652 STATUS POST TOTAL LEFT KNEE REPLACEMENT: Primary | ICD-10-CM

## 2024-03-04 RX ORDER — HYDROCODONE BITARTRATE AND ACETAMINOPHEN 10; 325 MG/1; MG/1
1 TABLET ORAL EVERY 6 HOURS PRN
Qty: 30 TABLET | Refills: 0 | Status: SHIPPED | OUTPATIENT
Start: 2024-03-04

## 2024-03-04 NOTE — TELEPHONE ENCOUNTER
Patient is calling requesting different pain medication, she had surgery on her right knee on 02/28/2024. She states that the pain medication she was prescribed then has not helped with the pain.     Her pharmacy is [facility] in [place]

## 2024-03-21 ENCOUNTER — OFFICE VISIT (OUTPATIENT)
Dept: ORTHOPEDIC SURGERY | Facility: CLINIC | Age: 74
End: 2024-03-21
Payer: MEDICARE

## 2024-03-21 VITALS — TEMPERATURE: 98.3 F

## 2024-03-21 DIAGNOSIS — Z96.652 STATUS POST TOTAL LEFT KNEE REPLACEMENT: Primary | ICD-10-CM

## 2024-03-21 PROCEDURE — 99024 POSTOP FOLLOW-UP VISIT: CPT | Performed by: ORTHOPAEDIC SURGERY

## 2024-03-21 NOTE — PROGRESS NOTES
[facility] [place] [facility] [place]        Subjective     Post-op (3 week S/P TOTAL KNEE ARTHROPLASTY WITH MAURILIO ROBOT - LEFT (DOS 02/28/2024))       FANG Lynn is a 73 y.o. female.  Patient is here today now 3 weeks out from robot-assisted left TKA on 2/28/2024.  She is here with her  today.  Patient tells me that she got off to a slow start with therapy as she did not get any therapy over the weekend after being in the hospital couple nights.  She denies chest pain or shortness of breath.          Objective      Physical Exam  Temp 98.3 °F (36.8 °C)     There is no height or weight on file to calculate BMI.        Ortho Exam      Lower Extremity:     Inspection and Palpation:      Left knee:  Calf:  Soft and non tender  Pulses:  2+  Ecchymosis:  None  Warmth:  Appropriate  Incision:  Clean, dry, and intact.  Healing appropriately  Assistive device: Rolling walker    ROM:  Left:  Extension: 10    flexion: 60      Functional Testing:  Left:  Straight Leg Raise: 5/5       Imaging Reviewed:  Imaging Results (Last 24 Hours)       Procedure Component Value Units Date/Time    XR Knee 3+ View With [place] Left [[id number]] Resulted: 03/21/24 1047     Updated: 03/21/24 1047    Narrative:      Knee X-ray    Indication: status-post TKA    Study:  AP, Lateral, and Sunrise views of Left knee    Comparison: Left knee 2/28/2024    Findings:  No signs of acute fracture are visualized  No signs of loosening are appreciated  Components are well aligned    Impression:  Status post Left press-fit total knee arthroplasty. No signs   of loosening or fracture.                Assessment    Assessment:  1. Status post total left knee replacement        Plan:  Status post left press-fit TKA--patient is quite behind in terms of her range of motion.  Her therapist needs to be more aggressive in restoring flexion and extension.  She should have been at 90 degrees of flexion at today's appointment.  If that means seeing  her more frequently than that needs to occur.  I want to see her back in 3 weeks and we will reassess motion.  The goal is 120 degrees of flexion.  Intraoperatively, she was -7.5-141 with regards to range of motion.  If she is not dramatically better, an DARYL will be discussed      Mateo Burris MD  03/21/24  13:26 EDT      Dictated Utilizing Dragon Dictation.

## 2024-04-09 DIAGNOSIS — I48.0 PAROXYSMAL ATRIAL FIBRILLATION: ICD-10-CM

## 2024-04-09 DIAGNOSIS — Z96.652 STATUS POST TOTAL LEFT KNEE REPLACEMENT: ICD-10-CM

## 2024-04-09 DIAGNOSIS — I10 PRIMARY HYPERTENSION: ICD-10-CM

## 2024-04-10 ENCOUNTER — TELEPHONE (OUTPATIENT)
Dept: ORTHOPEDIC SURGERY | Facility: CLINIC | Age: 74
End: 2024-04-10
Payer: MEDICARE

## 2024-04-10 RX ORDER — VALSARTAN 320 MG/1
320 TABLET ORAL DAILY
Qty: 90 TABLET | Refills: 1 | Status: SHIPPED | OUTPATIENT
Start: 2024-04-10

## 2024-04-10 RX ORDER — SENNOSIDES 8.6 MG
650 CAPSULE ORAL EVERY 8 HOURS
Qty: 90 TABLET | Refills: 0 | Status: SHIPPED | OUTPATIENT
Start: 2024-04-10 | End: 2024-04-15

## 2024-04-10 RX ORDER — ICOSAPENT ETHYL 1000 MG/1
2 CAPSULE ORAL 2 TIMES DAILY
Qty: 120 CAPSULE | Refills: 0 | Status: SHIPPED | OUTPATIENT
Start: 2024-04-10

## 2024-04-10 RX ORDER — HYDROCODONE BITARTRATE AND ACETAMINOPHEN 10; 325 MG/1; MG/1
1 TABLET ORAL EVERY 6 HOURS PRN
Qty: 30 TABLET | Refills: 0 | Status: SHIPPED | OUTPATIENT
Start: 2024-04-10 | End: 2024-04-15

## 2024-04-10 RX ORDER — DILTIAZEM HYDROCHLORIDE 120 MG/1
120 CAPSULE, EXTENDED RELEASE ORAL DAILY
Qty: 90 CAPSULE | Refills: 1 | Status: SHIPPED | OUTPATIENT
Start: 2024-04-10

## 2024-04-10 NOTE — TELEPHONE ENCOUNTER
Specialty Pharmacy Patient Management Program  Prescription Refill Request     Patient currently fills medications at  Pharmacy. Needing refill(s) on the following.      As of last conversation, patient was no longer planning to be a patient of our practice, had [employer] insurance.  Pended 30 day supply with no refills.     Last Office Visit: 12/30/2022  Next Office Visit: Not Scheduled    Requested Prescriptions     Pending Prescriptions Disp Refills    Vascepa 1 g capsule capsule 120 capsule 0     Sig: Take 2 capsules (2 grams) by mouth 2 (Two) Times a Day.     Lucia Cid, PharmD, BCACP  Clinical Specialty Pharmacist, Endocrinology  4/10/2024  10:15 EDT

## 2024-04-10 NOTE — TELEPHONE ENCOUNTER
JOSE WITH [facility] CALLED TO MAKE SURE THAT PATIENT NEEDS BOTH TYLENOL AND HYDROCODONE. THEY HAVE RECEIVED REQUESTS FOR BOTH. DR. ESCAMILLA REQUESTED THE HYDROCODONE AND THEN CHRISTOPHER PUT A SCRIPT IN FOR TYLENOL.    IF CLINICAL TEAM COULD PLEASE ADVISE.     CALL BACK # [phone]

## 2024-04-10 NOTE — TELEPHONE ENCOUNTER
Cesia,    Dr. Burris sent in [place] for the patient and you sent in Tylenol. Pharmacy is wanting to make sure you do want the tylenol since the [facility] already has it in there? Please advise thank you!  Bev TRINIDAD (R), ROT

## 2024-04-11 ENCOUNTER — OFFICE VISIT (OUTPATIENT)
Dept: ORTHOPEDIC SURGERY | Facility: CLINIC | Age: 74
End: 2024-04-11
Payer: MEDICARE

## 2024-04-11 DIAGNOSIS — M25.669 POSTOPERATIVE STIFFNESS OF TOTAL KNEE REPLACEMENT, SUBSEQUENT ENCOUNTER: ICD-10-CM

## 2024-04-11 DIAGNOSIS — T84.89XD POSTOPERATIVE STIFFNESS OF TOTAL KNEE REPLACEMENT, SUBSEQUENT ENCOUNTER: ICD-10-CM

## 2024-04-11 DIAGNOSIS — Z96.659 POSTOPERATIVE STIFFNESS OF TOTAL KNEE REPLACEMENT, SUBSEQUENT ENCOUNTER: ICD-10-CM

## 2024-04-11 DIAGNOSIS — Z96.652 STATUS POST TOTAL LEFT KNEE REPLACEMENT: Primary | ICD-10-CM

## 2024-04-11 PROCEDURE — 99024 POSTOP FOLLOW-UP VISIT: CPT | Performed by: ORTHOPAEDIC SURGERY

## 2024-04-11 NOTE — PROGRESS NOTES
[facility] [place] [facility] Note        Subjective     Post-op (3 week f/u - 6 week - S/P TOTAL KNEE ARTHROPLASTY WITH MAURILIO ROBOT - LEFT (DOS 02/28/2024))       FANG Lynn is a 73 y.o. female.  Patient is here today with her  for follow-up now 6 weeks out from robot-assisted left TKA on 2/28/2024.  She says the pain has gotten better.  She tells me that her range of motion has not much better.          Objective      Physical Exam  There were no vitals taken for this visit.    There is no height or weight on file to calculate BMI.        Ortho Exam  Left knee: Patient's range of motion is 10 to about 80.  There are couple of areas with a prominent Vicryl suture noted.  Calf is soft and nontender.  No ecchymosis noted.  Minimal effusion.    Imaging Reviewed:  Imaging Results (Last 24 Hours)       Procedure Component Value Units Date/Time    XR Knee 3+ View With [place] Left [[id number]] Resulted: 04/11/24 1233     Updated: 04/11/24 1234    Narrative:      Knee X-ray    Indication: status-post TKA    Study:  AP, Lateral, and Sunrise views of Left knee    Comparison: Left knee 3/21/2024    Findings:  No signs of acute fracture are visualized  No signs of loosening are appreciated  Components are well aligned    Impression:  Status post Left press-fit total knee arthroplasty. No signs   of loosening or fracture.                Assessment    Assessment:  1. Status post total left knee replacement    2. Postoperative stiffness of total knee replacement, subsequent encounter        Plan:  Status post press-fit left TKA with postop stiffness--we had a lengthy discussion with the patient and her  today regarding treatment options and alternatives going forward.  At this point I would think that it is in her best interest to proceed with a manipulation under anesthesia of the left knee.  She needs to start physical therapy immediately after the following day.  Risk, benefits, potential  hazards, typical recovery and rehab as well as reasonable alternatives to manipulation under anesthesia of the left knee replacement were discussed with the patient and her .  We discussed specifically the risk of periprosthetic fracture, tendon rupture, nerve damage, and loosening of the implants.  Patient understands and would like to proceed and we will get this going hopefully early next week and start her therapy the day after the procedure.      Mateo Burris MD  04/11/24  13:03 EDT      Dictated Utilizing Dragon Dictation.

## 2024-04-15 ENCOUNTER — OUTSIDE FACILITY SERVICE (OUTPATIENT)
Dept: ORTHOPEDIC SURGERY | Facility: CLINIC | Age: 74
End: 2024-04-15
Payer: MEDICARE

## 2024-04-15 DIAGNOSIS — T84.89XD POSTOPERATIVE STIFFNESS OF TOTAL KNEE REPLACEMENT, SUBSEQUENT ENCOUNTER: Primary | ICD-10-CM

## 2024-04-15 DIAGNOSIS — M25.669 POSTOPERATIVE STIFFNESS OF TOTAL KNEE REPLACEMENT, SUBSEQUENT ENCOUNTER: Primary | ICD-10-CM

## 2024-04-15 DIAGNOSIS — Z96.659 POSTOPERATIVE STIFFNESS OF TOTAL KNEE REPLACEMENT, SUBSEQUENT ENCOUNTER: Primary | ICD-10-CM

## 2024-04-15 PROCEDURE — 27570 FIXATION OF KNEE JOINT: CPT | Performed by: ORTHOPAEDIC SURGERY

## 2024-04-15 RX ORDER — SENNOSIDES 8.6 MG
650 CAPSULE ORAL EVERY 8 HOURS PRN
Qty: 30 TABLET | Refills: 0 | Status: SHIPPED | OUTPATIENT
Start: 2024-04-15

## 2024-04-15 RX ORDER — DOCUSATE SODIUM 100 MG/1
100 CAPSULE, LIQUID FILLED ORAL 2 TIMES DAILY PRN
Qty: 62 CAPSULE | Refills: 0 | Status: SHIPPED | OUTPATIENT
Start: 2024-04-15

## 2024-04-15 RX ORDER — HYDROCODONE BITARTRATE AND ACETAMINOPHEN 7.5; 325 MG/1; MG/1
1 TABLET ORAL EVERY 6 HOURS PRN
Qty: 30 TABLET | Refills: 0 | Status: SHIPPED | OUTPATIENT
Start: 2024-04-15

## 2024-04-15 RX ORDER — ONDANSETRON 4 MG/1
4 TABLET, FILM COATED ORAL EVERY 8 HOURS PRN
Qty: 10 TABLET | Refills: 1 | Status: SHIPPED | OUTPATIENT
Start: 2024-04-15

## 2024-05-07 ENCOUNTER — OFFICE VISIT (OUTPATIENT)
Dept: ORTHOPEDIC SURGERY | Facility: CLINIC | Age: 74
End: 2024-05-07
Payer: MEDICARE

## 2024-05-07 VITALS — TEMPERATURE: 97.9 F

## 2024-05-07 DIAGNOSIS — Z96.652 STATUS POST TOTAL LEFT KNEE REPLACEMENT: Primary | ICD-10-CM

## 2024-05-07 DIAGNOSIS — Z96.659 POSTOPERATIVE STIFFNESS OF TOTAL KNEE REPLACEMENT, SUBSEQUENT ENCOUNTER: ICD-10-CM

## 2024-05-07 DIAGNOSIS — T84.89XD POSTOPERATIVE STIFFNESS OF TOTAL KNEE REPLACEMENT, SUBSEQUENT ENCOUNTER: ICD-10-CM

## 2024-05-07 DIAGNOSIS — M25.669 POSTOPERATIVE STIFFNESS OF TOTAL KNEE REPLACEMENT, SUBSEQUENT ENCOUNTER: ICD-10-CM

## 2024-05-07 PROCEDURE — 1159F MED LIST DOCD IN RCRD: CPT | Performed by: PHYSICIAN ASSISTANT

## 2024-05-07 PROCEDURE — 99024 POSTOP FOLLOW-UP VISIT: CPT | Performed by: PHYSICIAN ASSISTANT

## 2024-05-07 PROCEDURE — 1160F RVW MEDS BY RX/DR IN RCRD: CPT | Performed by: PHYSICIAN ASSISTANT

## 2024-05-13 DIAGNOSIS — I10 PRIMARY HYPERTENSION: ICD-10-CM

## 2024-05-14 RX ORDER — CARVEDILOL 25 MG/1
25 TABLET ORAL 2 TIMES DAILY
Qty: 180 TABLET | Refills: 1 | Status: SHIPPED | OUTPATIENT
Start: 2024-05-14

## 2024-05-14 RX ORDER — ICOSAPENT ETHYL 1000 MG/1
2 CAPSULE ORAL 2 TIMES DAILY
Qty: 120 CAPSULE | Refills: 0 | OUTPATIENT
Start: 2024-05-14

## 2024-05-22 ENCOUNTER — TELEPHONE (OUTPATIENT)
Dept: CARDIOLOGY | Facility: HOSPITAL | Age: 74
End: 2024-05-22
Payer: MEDICARE

## 2024-05-22 DIAGNOSIS — I48.0 PAF (PAROXYSMAL ATRIAL FIBRILLATION): ICD-10-CM

## 2024-05-23 ENCOUNTER — TELEPHONE (OUTPATIENT)
Dept: CARDIOLOGY | Facility: HOSPITAL | Age: 74
End: 2024-05-23
Payer: MEDICARE

## 2024-05-23 NOTE — TELEPHONE ENCOUNTER
Ishmale Roberto, JUAN  [employer] (9:29 AM)       Refill has been sent.  Pt will need to f/u with [facility]  for continued refill needs.  She is not following with us regularly.

## 2024-06-04 ENCOUNTER — OFFICE VISIT (OUTPATIENT)
Dept: ORTHOPEDIC SURGERY | Facility: CLINIC | Age: 74
End: 2024-06-04
Payer: MEDICARE

## 2024-06-04 DIAGNOSIS — T84.89XD POSTOPERATIVE STIFFNESS OF TOTAL KNEE REPLACEMENT, SUBSEQUENT ENCOUNTER: ICD-10-CM

## 2024-06-04 DIAGNOSIS — M25.669 POSTOPERATIVE STIFFNESS OF TOTAL KNEE REPLACEMENT, SUBSEQUENT ENCOUNTER: ICD-10-CM

## 2024-06-04 DIAGNOSIS — Z96.652 STATUS POST TOTAL LEFT KNEE REPLACEMENT: Primary | ICD-10-CM

## 2024-06-04 DIAGNOSIS — Z96.659 POSTOPERATIVE STIFFNESS OF TOTAL KNEE REPLACEMENT, SUBSEQUENT ENCOUNTER: ICD-10-CM

## 2024-06-04 PROCEDURE — 99213 OFFICE O/P EST LOW 20 MIN: CPT | Performed by: ORTHOPAEDIC SURGERY

## 2024-06-04 NOTE — PROGRESS NOTES
[facility] [place] Orthopaedic Surgery Clinic Note        Subjective     Post-op (4 week follow-up: 7 weeks s/p left knee manipulation under anesthesia (4/15/24); S/P Left Total Knee Arthroplasty with Lila Robot - LEFT (2/28/24)/)       FANG    Renata Lynn is a 73 y.o. female.  Patient is now 3 months out from left robotic press-fit TKA and now about 7 weeks out from DARYL of the left knee on 4/15/2024.  She says she is doing better.  Continues to have some stiffness and gait alteration.  She is using her Dynasplint for 3 hours a day.          Objective      Physical Exam  There were no vitals taken for this visit.    There is no height or weight on file to calculate BMI.        Ortho Exam  Left knee: Incision is healing and free of erythema or drainage.  No effusion noted.  Patient's range of motion is  comfortably.    Imaging Reviewed and Interpreted:  Imaging Results (Last 24 Hours)       Procedure Component Value Units Date/Time    XR Knee 3+ View With [place] Left [[id number]] Resulted: 06/04/24 1027     Updated: 06/04/24 1027    Narrative:      Knee X-ray    Indication: status-post TKA    Study:  AP, Lateral, and Sunrise views of Left knee    Comparison: Left knee 4/11/2024    Findings:  No signs of acute fracture are visualized  No signs of loosening are appreciated  Components are well aligned    Impression:  Status post Left press-fit total knee arthroplasty. No signs   of loosening or fracture.                Assessment    Assessment:  1. Status post total left knee replacement    2. Postoperative stiffness of total knee replacement, subsequent encounter        Plan:  Status post left TKA with postop stiffness status post DARYL--patient is better overall.  Think she is clearly in the middle of her inflammatory phase which is contributing to stiffness.  Needs to continue using the Dynasplint needs to continue with physical therapy.  I will see her back in 3 months with an x-ray of her knee.  We will continue to  encourage her to use antibiotics before dental and invasive procedures.      Mateo Burris MD  06/04/24  10:29 EDT      Dictated Utilizing Dragon Dictation.

## 2024-07-02 DIAGNOSIS — I48.0 PAF (PAROXYSMAL ATRIAL FIBRILLATION): ICD-10-CM

## 2024-08-18 DIAGNOSIS — I48.0 PAROXYSMAL ATRIAL FIBRILLATION: ICD-10-CM

## 2024-08-18 DIAGNOSIS — I10 PRIMARY HYPERTENSION: ICD-10-CM

## 2024-08-19 RX ORDER — VALSARTAN 320 MG/1
320 TABLET ORAL DAILY
Qty: 90 TABLET | Refills: 0 | Status: SHIPPED | OUTPATIENT
Start: 2024-08-19

## 2024-08-19 RX ORDER — DILTIAZEM HYDROCHLORIDE 120 MG/1
120 CAPSULE, EXTENDED RELEASE ORAL DAILY
Qty: 90 CAPSULE | Refills: 0 | Status: SHIPPED | OUTPATIENT
Start: 2024-08-19

## 2024-09-16 ENCOUNTER — OFFICE VISIT (OUTPATIENT)
Dept: ORTHOPEDIC SURGERY | Facility: CLINIC | Age: 74
End: 2024-09-16
Payer: MEDICARE

## 2024-09-16 VITALS
WEIGHT: 189.8 LBS | DIASTOLIC BLOOD PRESSURE: 60 MMHG | BODY MASS INDEX: 30.5 KG/M2 | HEIGHT: 66 IN | SYSTOLIC BLOOD PRESSURE: 138 MMHG

## 2024-09-16 DIAGNOSIS — T84.89XD POSTOPERATIVE STIFFNESS OF TOTAL KNEE REPLACEMENT, SUBSEQUENT ENCOUNTER: ICD-10-CM

## 2024-09-16 DIAGNOSIS — M25.669 POSTOPERATIVE STIFFNESS OF TOTAL KNEE REPLACEMENT, SUBSEQUENT ENCOUNTER: ICD-10-CM

## 2024-09-16 DIAGNOSIS — Z96.659 POSTOPERATIVE STIFFNESS OF TOTAL KNEE REPLACEMENT, SUBSEQUENT ENCOUNTER: ICD-10-CM

## 2024-09-16 DIAGNOSIS — Z96.652 STATUS POST TOTAL LEFT KNEE REPLACEMENT: Primary | ICD-10-CM

## 2024-09-16 PROCEDURE — 1159F MED LIST DOCD IN RCRD: CPT | Performed by: PHYSICIAN ASSISTANT

## 2024-09-16 PROCEDURE — 1160F RVW MEDS BY RX/DR IN RCRD: CPT | Performed by: PHYSICIAN ASSISTANT

## 2024-09-16 PROCEDURE — 3078F DIAST BP <80 MM HG: CPT | Performed by: PHYSICIAN ASSISTANT

## 2024-09-16 PROCEDURE — 99213 OFFICE O/P EST LOW 20 MIN: CPT | Performed by: PHYSICIAN ASSISTANT

## 2024-09-16 PROCEDURE — 3075F SYST BP GE 130 - 139MM HG: CPT | Performed by: PHYSICIAN ASSISTANT

## 2024-10-21 RX ORDER — FLECAINIDE ACETATE 50 MG/1
50 TABLET ORAL 2 TIMES DAILY
Qty: 180 TABLET | Refills: 0 | Status: SHIPPED | OUTPATIENT
Start: 2024-10-21

## 2024-11-29 DIAGNOSIS — I48.0 PAROXYSMAL ATRIAL FIBRILLATION: ICD-10-CM

## 2024-11-29 DIAGNOSIS — I48.0 PAF (PAROXYSMAL ATRIAL FIBRILLATION): ICD-10-CM

## 2024-12-02 RX ORDER — DILTIAZEM HYDROCHLORIDE 120 MG/1
120 CAPSULE, EXTENDED RELEASE ORAL DAILY
Qty: 90 CAPSULE | Refills: 0 | Status: SHIPPED | OUTPATIENT
Start: 2024-12-02

## 2024-12-17 ENCOUNTER — OFFICE VISIT (OUTPATIENT)
Dept: ORTHOPEDIC SURGERY | Facility: CLINIC | Age: 74
End: 2024-12-17
Payer: MEDICARE

## 2024-12-17 VITALS
SYSTOLIC BLOOD PRESSURE: 132 MMHG | BODY MASS INDEX: 31.66 KG/M2 | WEIGHT: 197 LBS | DIASTOLIC BLOOD PRESSURE: 88 MMHG | HEIGHT: 66 IN

## 2024-12-17 DIAGNOSIS — Z96.652 STATUS POST TOTAL LEFT KNEE REPLACEMENT: Primary | ICD-10-CM

## 2024-12-17 NOTE — PROGRESS NOTES
[facility] [place] Orthopedic Surgery Clinic Note        Subjective     CC: Follow-up (3 month recheck- 8 months s/p left knee manipulation under anesthesia (4/15/24); and 9.5 months S/P Left Total Knee Arthroplasty with Lila Robot - LEFT (24))      FANG Lynn is a 74 y.o. female.  Patient is here today now 10 months out from robot-assisted press-fit left TKA on 2024.  Underwent manipulation under anesthesia on 4/15/2024.  Still using a Dynasplint.  Is feeling much better overall.  Occasional swelling.    Overall, patient's symptoms are as above    ROS:    Constiutional:Pt denies fever, chills, nausea, or vomiting.  MSK:as above        Objective      Past Medical History  Past Medical History:   Diagnosis Date    Asthma ?      Listed on hospital release form    Atrial fibrillation Oct  27, 2023    Basal cell carcinoma     face    CTS (carpal tunnel syndrome)     GERD (gastroesophageal reflux disease)     Hypertensive disorder     Mixed hyperlipidemia     Obesity     body mass index 30+-obesity    Rotator cuff syndrome 01/15/24    Currently in pt    Sleep apnea  ?    CPAP nightly    Tear of meniscus of knee     Surgery scheduled 24    Type 2 diabetes mellitus, uncontrolled      Social History     Socioeconomic History    Marital status:    Tobacco Use    Smoking status: Former     Current packs/day: 0.00     Average packs/day: 1 pack/day for 20.0 years (20.0 ttl pk-yrs)     Types: Cigarettes     Start date:      Quit date: 2004     Years since quittin.9     Passive exposure: Past    Smokeless tobacco: Never    Tobacco comments:     Started late teens stopped late 30s   Vaping Use    Vaping status: Never Used   Substance and Sexual Activity    Alcohol use: Not Currently     Comment: occasional    Drug use: Never    Sexual activity: Yes     Partners: Male     Birth control/protection: Condom, Spermicide, Birth control pill, Injection          Physical Exam  /88   Ht  "167.6 cm (65.98\")   Wt 89.4 kg (197 lb)   BMI 31.81 kg/m²     Body mass index is 31.81 kg/m².    Patient is well nourished and well developed.        Ortho Exam    Lower Extremity:     Inspection and Palpation:      Left knee:  Calf:  Soft and non tender  Effusion:  None  Pulses:  2+  Warmth:  None  Incision:  Healed     ROM:  Left:  Extension:5    Flexion:115      Deformities/Malalignments/Discrepancies:    Left:  none    Functional Testing:  Left:  Straight Leg Raise: 5/5  Patella Tracking:  Normal      Imaging/Labs/EMG Reviewed and Interpreted:  Imaging Results (Last 24 Hours)       ** No results found for the last 24 hours. **              Assessment    Assessment:  1. Status post total left knee replacement        Plan:  Recommend over the counter anti-inflammatories for pain and/or swelling  Status post left TKA--patient is doing much better overall.  Can discontinue the Dynasplint.  Follow-up in February 2025 with an x-ray of her left knee.  Continue indefinite antibiotic prophylaxis for dental cleanings, dental procedures, major and minor surgery.      Mateo Burris MD  12/17/24  13:10 EST      Dictated Utilizing Dragon Dictation.  "

## 2024-12-28 DIAGNOSIS — I10 PRIMARY HYPERTENSION: ICD-10-CM

## 2024-12-30 RX ORDER — VALSARTAN 320 MG/1
320 TABLET ORAL DAILY
Qty: 90 TABLET | Refills: 0 | Status: SHIPPED | OUTPATIENT
Start: 2024-12-30

## 2025-01-27 DIAGNOSIS — I10 PRIMARY HYPERTENSION: ICD-10-CM

## 2025-01-27 RX ORDER — FLECAINIDE ACETATE 50 MG/1
50 TABLET ORAL 2 TIMES DAILY
Qty: 180 TABLET | Refills: 0 | Status: CANCELLED | OUTPATIENT
Start: 2025-01-27

## 2025-01-27 RX ORDER — CARVEDILOL 25 MG/1
25 TABLET ORAL 2 TIMES DAILY
Qty: 180 TABLET | Refills: 1 | Status: CANCELLED | OUTPATIENT
Start: 2025-01-27

## 2025-01-28 RX ORDER — CARVEDILOL 25 MG/1
25 TABLET ORAL 2 TIMES DAILY
Qty: 180 TABLET | Refills: 1 | Status: SHIPPED | OUTPATIENT
Start: 2025-01-28

## 2025-01-28 RX ORDER — FLECAINIDE ACETATE 50 MG/1
50 TABLET ORAL 2 TIMES DAILY
Qty: 180 TABLET | Refills: 1 | Status: SHIPPED | OUTPATIENT
Start: 2025-01-28

## 2025-02-27 ENCOUNTER — OFFICE VISIT (OUTPATIENT)
Dept: ORTHOPEDIC SURGERY | Facility: CLINIC | Age: 75
End: 2025-02-27
Payer: MEDICARE

## 2025-02-27 VITALS
BODY MASS INDEX: 31.82 KG/M2 | SYSTOLIC BLOOD PRESSURE: 126 MMHG | HEIGHT: 66 IN | WEIGHT: 198 LBS | DIASTOLIC BLOOD PRESSURE: 84 MMHG

## 2025-02-27 DIAGNOSIS — Z96.652 STATUS POST TOTAL LEFT KNEE REPLACEMENT: Primary | ICD-10-CM

## 2025-02-27 NOTE — PROGRESS NOTES
[facility] [place] Orthopedic Surgery Clinic Note        Subjective     CC: Follow-up (10 WEEK FOLLOW UP --1 YEAR  s/p left knee manipulation under anesthesia (4/15/24); and 9.5 months S/P Left Total Knee Arthroplasty with Lila Robot - LEFT (24/)      HPI    Renata Lynn is a 74 y.o. female.  Patient here today now 1 year out from press-fit robot-assisted left TKA requiring DARYL 6 to 8 weeks after surgery.  She is doing well at this point.  Cannot get up off the ground but otherwise does everything she wants to do.    Overall, patient's symptoms are as above    ROS:    Constiutional:Pt denies fever, chills, nausea, or vomiting.  MSK:as above        Objective      Past Medical History  Past Medical History:   Diagnosis Date    Asthma ?      Listed on hospital release form    Atrial fibrillation Oct  27, 2023    Basal cell carcinoma     face    CTS (carpal tunnel syndrome)     GERD (gastroesophageal reflux disease)     Hypertensive disorder     Mixed hyperlipidemia     Obesity     body mass index 30+-obesity    Rotator cuff syndrome 01/15/24    Currently in pt    Sleep apnea  ?    CPAP nightly    Tear of meniscus of knee     Surgery scheduled 24    Type 2 diabetes mellitus, uncontrolled      Social History     Socioeconomic History    Marital status:    Tobacco Use    Smoking status: Former     Current packs/day: 0.00     Average packs/day: 1 pack/day for 20.0 years (20.0 ttl pk-yrs)     Types: Cigarettes     Start date:      Quit date: 2004     Years since quittin.1     Passive exposure: Past    Smokeless tobacco: Never    Tobacco comments:     Started late teens stopped late 30s   Vaping Use    Vaping status: Never Used   Substance and Sexual Activity    Alcohol use: Not Currently     Comment: occasional    Drug use: Never    Sexual activity: Yes     Partners: Male     Birth control/protection: Condom, Spermicide, Birth control pill, Injection          Physical Exam  /84   Ht  "167.6 cm (65.98\")   Wt 89.8 kg (198 lb)   BMI 31.98 kg/m²     Body mass index is 31.98 kg/m².    Patient is well nourished and well developed.        Ortho Exam    Lower Extremity:     Inspection and Palpation:      Left knee:  Calf:  Soft and non tender  Effusion:  None  Pulses:  2+  Warmth:  None  Incision:  Healed     ROM:  Left:  Extension:5    Flexion:110      Deformities/Malalignments/Discrepancies:    Left:  none    Functional Testing:  Left:  Straight Leg Raise: 5/5  Patella Tracking:  Normal      Imaging/Labs/EMG Reviewed and Interpreted:  Imaging Results (Last 24 Hours)       Procedure Component Value Units Date/Time    XR Knee 3+ View With [place] Left [[id number]] Resulted: 02/27/25 0902     Updated: 02/27/25 0902    Narrative:      Knee X-ray    Indication: status-post TKA    Study:  AP, Lateral, and Sunrise views of Left knee    Comparison: Left knee 9/16/2024    Findings:  No signs of acute fracture are visualized  No signs of loosening are appreciated  Components are well aligned    Impression:  Status post Left press-fit total knee arthroplasty. No signs   of loosening or fracture.                Assessment    Assessment:  1. Status post total left knee replacement        Plan:  Recommend over the counter anti-inflammatories for pain and/or swelling  Status post left TKA--patient doing great overall.  Continue indefinite antibiotic prophylaxis.  Follow-up in year with x-rays or sooner if necessary.      Mateo Burris MD  02/27/25  09:31 EST      Dictated Utilizing Dragon Dictation.  "

## 2025-03-03 DIAGNOSIS — I48.0 PAF (PAROXYSMAL ATRIAL FIBRILLATION): ICD-10-CM

## 2025-03-05 ENCOUNTER — OFFICE VISIT (OUTPATIENT)
Dept: CARDIOLOGY | Facility: CLINIC | Age: 75
End: 2025-03-05
Payer: MEDICARE

## 2025-03-05 VITALS
HEART RATE: 72 BPM | HEIGHT: 66 IN | BODY MASS INDEX: 31.79 KG/M2 | OXYGEN SATURATION: 93 % | DIASTOLIC BLOOD PRESSURE: 70 MMHG | WEIGHT: 197.8 LBS | SYSTOLIC BLOOD PRESSURE: 142 MMHG

## 2025-03-05 DIAGNOSIS — I10 BENIGN HYPERTENSION: ICD-10-CM

## 2025-03-05 DIAGNOSIS — I48.0 PAROXYSMAL ATRIAL FIBRILLATION: Primary | ICD-10-CM

## 2025-03-05 DIAGNOSIS — E78.2 MIXED HYPERLIPIDEMIA: ICD-10-CM

## 2025-03-05 NOTE — PROGRESS NOTES
[facility] Cardiology  Office visit  Renata Lynn  1950  [phone]  There is no work phone number on file.    VISIT DATE:  3/5/2025    PCP: Anuj Sarah DO  [address]   [facility] [address]    CC:  Chief Complaint   Patient presents with    Paroxysmal atrial fibrillation       Previous cardiac studies and procedures:  October 2022  Myocardial perfusion imaging    Mild to moderate calcifications visualized in the mid LAD.  Mild to moderate scattered calcifications visualized in the descending aorta.    Left ventricular ejection fraction is hyperdynamic (Calculated EF > 70%).    Myocardial perfusion imaging indicates a normal myocardial perfusion study with no evidence of ischemia.  TTE    Left ventricular systolic function is normal. Calculated left ventricular EF = 63.4% Left ventricular ejection fraction appears to be 61 - 65%.    Left ventricular wall thickness is consistent with mild concentric hypertrophy.    Left ventricular diastolic function is consistent with (grade Ia w/high LAP) impaired relaxation.    Estimated right ventricular systolic pressure from tricuspid regurgitation is normal (<35 mmHg). Calculated right ventricular systolic pressure from tricuspid regurgitation is 21 mmHg.     ASSESSMENT:   Diagnosis Plan   1. Paroxysmal atrial fibrillation        2. Mixed hyperlipidemia        3. Benign hypertension              PLAN:  Paroxysmal atrial fibrillation: [id number] equal to 4.  Continue Eliquis 5 mg p.o. twice daily for stroke prophylaxis.  Continue rate control with combination of carvedilol and diltiazem.   Continue flecainide 50 mg p.o. daily, developed intermittent symptomatic bradycardia at higher doses..  Continue CPAP therapy.      Hypertension: Goal less than 130/80 mmHg.  Currently reasonable control.  We will continue to keep a blood pressure log.  Associated asymptomatic diastolic dysfunction and mild concentric left  "ventricular hypertrophy.  Continue current medical therapy.     Hyperlipidemia: Goal LDL less than 70.  Continue current medical therapy.      Subjective  Interval assessment: No recent episodes of A-fib.  She is compliant with medical therapy.  Blood pressures running less than 130/80 mmHg.    Initial evaluation: 71-year-old diabetic female with history of hypertension, dyslipidemia and obstructive sleep apnea on CPAP therapy recently presented with symptomatic paroxysmal atrial. fib with RVR.  She is undergone cardiac evaluation with transthoracic echo and Uma scan myocardial perfusion imaging.  Carvedilol was titrated to 25 mg p.o. twice daily and Eliquis was added to her regimen.  She had initial 2 or 3 brief episodes in the 2 to 3 weeks after her initial diagnosis at the end of September which were milder and nature following titration of beta-blockade.  Has been fairly asymptomatic over the previous 4 to 6 weeks.  Was noted to have hypomagnesemia on ED evaluation, repeat 1.9.  Has not been keeping track of home blood pressures recently but were previously running less than 135/85 mmHg.  She is compliant with medical therapy.    PHYSICAL EXAMINATION:  Vitals:    03/05/25 1011   BP: 142/70   Pulse: 72   SpO2: 93%   Weight: 89.7 kg (197 lb 12.8 oz)   Height: 167.6 cm (66\")     General Appearance:    Alert, cooperative, no distress, appears stated age   Head:    Normocephalic, without obvious abnormality, atraumatic   Eyes:    conjunctiva/corneas clear   Nose:   Nares normal, septum midline, mucosa normal, no drainage   Throat:   Lips, teeth and gums normal   Neck:   Supple, symmetrical, trachea midline, no carotid    bruit or JVD   Lungs:     Clear to auscultation bilaterally, respirations unlabored   Chest Wall:    No tenderness or deformity    Heart:    Regular rate and rhythm, S1 and S2 normal, 2/6 early peaking systolic murmur right upper sternal border, no rub   or gallop, normal carotid impulse bilaterally " without bruit.   Abdomen:     Soft, non-tender   Extremities:   Extremities normal, atraumatic, no cyanosis or edema   Pulses:   2+ and symmetric all extremities   Skin:   Skin color, texture, turgor normal, no rashes or lesions       Diagnostic Data:    ECG 12 Lead    Date/Time: 3/5/2025 10:36 AM  Performed by: Babak Jacobson III, MD    Authorized by: Babak Jacobson III, MD  Comparison: compared with previous ECG from 2/13/2024  Similar to previous ECG  Rhythm: sinus rhythm  Conduction: right bundle branch block and left anterior fascicular block    Clinical impression: abnormal EKG        Lab Results   Component Value Date    CHLPL 170 08/04/2021    TRIG 471 (H) 08/04/2021    HDL 34 (L) 08/04/2021     Lab Results   Component Value Date    GLUCOSE 143 (H) 02/29/2024    BUN 15 02/29/2024    CREATININE 0.79 02/29/2024     02/29/2024    K 4.4 02/29/2024     02/29/2024    CO2 25.0 02/29/2024     Lab Results   Component Value Date    HGBA1C 7.50 (H) 02/15/2024     Lab Results   Component Value Date    WBC 8.14 02/29/2024    HGB 10.4 (L) 02/29/2024    HCT 32.5 (L) 02/29/2024     02/29/2024       Allergies  Allergies   Allergen Reactions    Penicillins Anaphylaxis and Swelling       Current Medications    Current Outpatient Medications:     Accu-Chek FastClix Lancets misc, Test blood glucose daily and as needed., Disp: 300 each, Rfl: 3    acetaminophen (TYLENOL) 650 MG 8 hr tablet, Take 1 tablet by mouth Every 8 (Eight) Hours As Needed for Mild Pain., Disp: 30 tablet, Rfl: 0    albuterol sulfate HFA (Ventolin HFA) 108 (90 Base) MCG/ACT inhaler, Inhale 2 puffs Every 4 (Four) Hours, as directed., Disp: 8.5 g, Rfl: 5    albuterol sulfate  (90 Base) MCG/ACT inhaler, Inhale 2 puffs Every 4 (Four) Hours As Needed., Disp: , Rfl:     apixaban (ELIQUIS) 5 MG tablet tablet, Take 1 tablet by mouth Every 12 (Twelve) Hours. Keep scheduled follow up appt., Disp: 60 tablet, Rfl: 2    atorvastatin (LIPITOR) 10 MG  tablet, Take 1 tablet by mouth every night at bedtime., Disp: 90 tablet, Rfl: 1    B Complex Vitamins (VITAMIN B COMPLEX PO), Take 1 tablet by mouth Daily., Disp: , Rfl:     Blood Glucose Monitoring Suppl (True Metrix Meter) w/Device kit, Use to check blood sugar daily as directed by provider, Disp: 1 kit, Rfl: 0    calcium carbonate (OS-SHANICE) 600 MG tablet, Take 2 tablets by mouth Daily., Disp: , Rfl:     carvedilol (COREG) 25 MG tablet, Take 1 tablet by mouth 2 (Two) Times a Day., Disp: 180 tablet, Rfl: 1    Continuous Blood Gluc Sensor (FreeStyle Terry 3 Sensor) misc, Change Every 14 (Fourteen) Days., Disp: 2 each, Rfl: 11    dilTIAZem XR (Dilt-XR) 120 MG 24 hr capsule, Take 1 capsule by mouth Daily., Disp: 90 capsule, Rfl: 0    esomeprazole (nexIUM) 20 MG capsule, Take 1 capsule by mouth Every Morning Before Breakfast., Disp: , Rfl:     flecainide (TAMBOCOR) 50 MG tablet, Take 1 tablet by mouth 2 (Two) Times a Day., Disp: 180 tablet, Rfl: 1    glucosamine-chondroitin 500-400 MG capsule capsule, Take  by mouth 2 (Two) Times a Day., Disp: , Rfl:     glucose blood (Accu-Chek Guide) test strip, Test blood glucose daily and as needed., Disp: 300 each, Rfl: 3    glucose blood test strip, , Disp: , Rfl:     hydroCHLOROthiazide 25 MG tablet, Take 1 tablet by mouth Daily., Disp: 90 tablet, Rfl: 0    icosapent ethyl (Vascepa) 1 g capsule capsule, Take 2 capsules by mouth 2 Times a Day., Disp: 360 capsule, Rfl: 1    metFORMIN (GLUCOPHAGE) 1000 MG tablet, Take 1 tablet by mouth 2 (Two) Times a Day., Disp: 180 tablet, Rfl: 1    montelukast (SINGULAIR) 10 MG tablet, Take 1 tablet by mouth Daily., Disp: 90 tablet, Rfl: 1    multivitamin with minerals tablet tablet, Take 1 tablet by mouth Daily., Disp: , Rfl:     Perfluorohexyloctane 1.338 GM/ML solution, Administer 1 drop to both eyes 2 (Two) Times a Day., Disp: 3 mL, Rfl: 0    Semaglutide, 2 MG/DOSE, (Ozempic, 2 MG/DOSE,) 8 MG/3ML solution pen-injector, Inject 2 mg under the  skin into the appropriate area as directed 1 (One) Time Per Week., Disp: 9 mL, Rfl: 0    traZODone (DESYREL) 50 MG tablet, Take 2 tablets by mouth Daily., Disp: 60 tablet, Rfl: 3    Tyrvaya 0.03 MG/ACT solution, INSTILL 1 SPRAY IN EACH NOSTRIL TWICE DAILY APPROXIMATELY 12 HOURS APART, Disp: , Rfl:     valACYclovir (VALTREX) 1000 MG tablet, Take 1 tablet by mouth Daily for 3 days as Needed., Disp: 30 tablet, Rfl: 0    valsartan (DIOVAN) 320 MG tablet, Take 1 tablet by mouth Daily., Disp: 90 tablet, Rfl: 0    vitamin D3 125 MCG (5000 UT) capsule capsule, Take 1 capsule by mouth Daily., Disp: , Rfl:     vitamin E 100 UNIT capsule, Take 2 capsules by mouth Daily., Disp: , Rfl:     Zinc Sulfate (ZINC-220 PO), Take 1 tablet by mouth Daily., Disp: , Rfl:           ROS  ROS      SOCIAL HX  Social History     Socioeconomic History    Marital status:    Tobacco Use    Smoking status: Former     Current packs/day: 0.00     Average packs/day: 1 pack/day for 20.0 years (20.0 ttl pk-yrs)     Types: Cigarettes     Start date:      Quit date:      Years since quittin.1     Passive exposure: Past    Smokeless tobacco: Never    Tobacco comments:     Started late teens stopped late 30s   Vaping Use    Vaping status: Never Used   Substance and Sexual Activity    Alcohol use: Not Currently     Comment: occasional    Drug use: Never    Sexual activity: Yes     Partners: Male     Birth control/protection: Condom, Spermicide, Birth control pill, Injection       FAMILY HX  Family History   Problem Relation Age of Onset    Breast cancer Mother     Cancer Mother     Heart disease Father     Rheumatic fever Father     Heart attack Sister     Diabetes Sister     Thyroid disease Sister     Skin cancer Sister     No Known Problems Brother     Breast cancer Maternal Grandmother 89    Diabetes Maternal Grandfather     Alzheimer's disease Paternal Grandmother     No Known Problems Paternal Grandfather     Heart attack Sister      Cancer Sister              Babak Jacobson III, MD, FACC

## 2025-03-07 DIAGNOSIS — I48.0 PAROXYSMAL ATRIAL FIBRILLATION: ICD-10-CM

## 2025-03-10 RX ORDER — DILTIAZEM HYDROCHLORIDE 120 MG/1
120 CAPSULE, EXTENDED RELEASE ORAL DAILY
Qty: 90 CAPSULE | Refills: 1 | Status: SHIPPED | OUTPATIENT
Start: 2025-03-10

## 2025-04-04 DIAGNOSIS — I10 PRIMARY HYPERTENSION: ICD-10-CM

## 2025-04-04 RX ORDER — VALSARTAN 320 MG/1
320 TABLET ORAL DAILY
Qty: 90 TABLET | Refills: 1 | Status: SHIPPED | OUTPATIENT
Start: 2025-04-04

## 2025-05-28 DIAGNOSIS — I48.0 PAF (PAROXYSMAL ATRIAL FIBRILLATION): ICD-10-CM

## 2025-07-21 DIAGNOSIS — I10 PRIMARY HYPERTENSION: ICD-10-CM

## 2025-07-21 RX ORDER — CARVEDILOL 25 MG/1
25 TABLET ORAL 2 TIMES DAILY
Qty: 180 TABLET | Refills: 0 | Status: SHIPPED | OUTPATIENT
Start: 2025-07-21

## 2025-07-21 RX ORDER — FLECAINIDE ACETATE 50 MG/1
50 TABLET ORAL 2 TIMES DAILY
Qty: 180 TABLET | Refills: 0 | Status: SHIPPED | OUTPATIENT
Start: 2025-07-21

## 2025-07-30 DIAGNOSIS — I10 PRIMARY HYPERTENSION: ICD-10-CM

## 2025-07-30 RX ORDER — VALSARTAN 320 MG/1
320 TABLET ORAL DAILY
Qty: 90 TABLET | Refills: 0 | Status: SHIPPED | OUTPATIENT
Start: 2025-07-30

## 2025-08-21 DIAGNOSIS — I48.0 PAF (PAROXYSMAL ATRIAL FIBRILLATION): ICD-10-CM

## (undated) DEVICE — GLV SURG PREMIERPRO MIC LTX PF SZ8 BRN

## (undated) DEVICE — BLD SCLPL BP SS SZ15

## (undated) DEVICE — BLD SCLPL RIBBACK C/SS SZ15

## (undated) DEVICE — BLANKT WARM UPPR/BDY ARM/OUT 57X196CM

## (undated) DEVICE — PAD CAST SOF ROL STRL 6IN LF

## (undated) DEVICE — SUT VIC 2/0 CT1 36IN UD VCP945H

## (undated) DEVICE — PK KN TOTL 10

## (undated) DEVICE — PAD ARMBRD SURG CONVOL 7.5X20X2IN

## (undated) DEVICE — SCRW HEX PERSONA FML 2.5X25MM PK/2
Type: IMPLANTABLE DEVICE | Site: KNEE | Status: NON-FUNCTIONAL
Removed: 2024-02-28

## (undated) DEVICE — DRP ROBOTIC ROSA BX/20

## (undated) DEVICE — SYS CLS SKIN PREMIERPRO EXOFINFUSION 22CM

## (undated) DEVICE — TBG PENCL TELESCP MEGADYNE SMOKE EVAC 10FT

## (undated) DEVICE — SUT VIC 2/0  CT1 CR8 18IN VCP839D

## (undated) DEVICE — PAD GRND E/S MEGADYNE MONOPLR 2/PLT W/CORD A/ DISP

## (undated) DEVICE — BLD SAW SAG SYS6 HVY DTY 18X1.27X90MM

## (undated) DEVICE — KT PUMP INFUBLOCK MDL 2100 PMKITSOLIS

## (undated) DEVICE — SUT MONOCRYL PLS ANTIB UND 3/0  PS1 27IN

## (undated) DEVICE — SYS IRRISEPT JET LAVG CHG .05PCT

## (undated) DEVICE — BNDG ELAS W/CLIP 6IN 10YD LF STRL

## (undated) DEVICE — Device

## (undated) DEVICE — CONTN URINE STRL SURG

## (undated) DEVICE — TRY EPID SFTY 18G 3.5IN 1T7680

## (undated) DEVICE — BLD SAG SYSTEM6 25X1.27X90MM

## (undated) DEVICE — SYR LUERLOK 30CC

## (undated) DEVICE — TRAP FLD MINIVAC MEGADYNE 100ML

## (undated) DEVICE — SWABSTK SKINPREP PVP 2/8IN/SPNG STRL

## (undated) DEVICE — DRSNG PAD ABD 8X10IN STRL

## (undated) DEVICE — DRSNG SURESITE WNDW 4X4.5